# Patient Record
Sex: FEMALE | Race: WHITE | NOT HISPANIC OR LATINO | Employment: OTHER | URBAN - METROPOLITAN AREA
[De-identification: names, ages, dates, MRNs, and addresses within clinical notes are randomized per-mention and may not be internally consistent; named-entity substitution may affect disease eponyms.]

---

## 2017-01-09 ENCOUNTER — COMMUNICATION - HEALTHEAST (OUTPATIENT)
Dept: OBGYN | Facility: CLINIC | Age: 32
End: 2017-01-09

## 2017-01-10 ENCOUNTER — COMMUNICATION - HEALTHEAST (OUTPATIENT)
Dept: OBGYN | Facility: CLINIC | Age: 32
End: 2017-01-10

## 2017-01-10 DIAGNOSIS — Z34.82 ENCOUNTER FOR SUPERVISION OF OTHER NORMAL PREGNANCY, SECOND TRIMESTER: ICD-10-CM

## 2017-01-26 ENCOUNTER — PRENATAL OFFICE VISIT - HEALTHEAST (OUTPATIENT)
Dept: MIDWIFE SERVICES | Facility: CLINIC | Age: 32
End: 2017-01-26

## 2017-01-26 DIAGNOSIS — Z34.83 ENCOUNTER FOR SUPERVISION OF OTHER NORMAL PREGNANCY, THIRD TRIMESTER: ICD-10-CM

## 2017-01-26 ASSESSMENT — MIFFLIN-ST. JEOR: SCORE: 1334.52

## 2017-02-14 ENCOUNTER — PRENATAL OFFICE VISIT - HEALTHEAST (OUTPATIENT)
Dept: MIDWIFE SERVICES | Facility: CLINIC | Age: 32
End: 2017-02-14

## 2017-02-14 DIAGNOSIS — Z34.83 ENCOUNTER FOR SUPERVISION OF OTHER NORMAL PREGNANCY, THIRD TRIMESTER: ICD-10-CM

## 2017-02-14 ASSESSMENT — MIFFLIN-ST. JEOR: SCORE: 1357.2

## 2017-02-17 ENCOUNTER — ANESTHESIA - HEALTHEAST (OUTPATIENT)
Dept: OBGYN | Facility: HOSPITAL | Age: 32
End: 2017-02-17

## 2017-02-17 ENCOUNTER — COMMUNICATION - HEALTHEAST (OUTPATIENT)
Dept: OBGYN | Facility: CLINIC | Age: 32
End: 2017-02-17

## 2017-02-17 ENCOUNTER — SURGERY - HEALTHEAST (OUTPATIENT)
Dept: OBGYN | Facility: HOSPITAL | Age: 32
End: 2017-02-17

## 2017-02-17 ASSESSMENT — MIFFLIN-ST. JEOR: SCORE: 1357.2

## 2017-02-21 ENCOUNTER — COMMUNICATION - HEALTHEAST (OUTPATIENT)
Dept: OBGYN | Facility: CLINIC | Age: 32
End: 2017-02-21

## 2017-02-23 ENCOUNTER — OFFICE VISIT - HEALTHEAST (OUTPATIENT)
Dept: OBGYN | Facility: CLINIC | Age: 32
End: 2017-02-23

## 2017-02-23 DIAGNOSIS — Z98.890 POST-OPERATIVE STATE: ICD-10-CM

## 2017-02-23 ASSESSMENT — MIFFLIN-ST. JEOR: SCORE: 1334.52

## 2017-03-03 ENCOUNTER — COMMUNICATION - HEALTHEAST (OUTPATIENT)
Dept: OBGYN | Facility: CLINIC | Age: 32
End: 2017-03-03

## 2017-03-03 DIAGNOSIS — Z98.891 STATUS POST CESAREAN SECTION: ICD-10-CM

## 2017-03-03 DIAGNOSIS — O47.00 PRETERM CONTRACTIONS: ICD-10-CM

## 2017-03-03 DIAGNOSIS — Z34.83 ENCOUNTER FOR SUPERVISION OF OTHER NORMAL PREGNANCY, THIRD TRIMESTER: ICD-10-CM

## 2017-05-11 ENCOUNTER — RECORDS - HEALTHEAST (OUTPATIENT)
Dept: ADMINISTRATIVE | Facility: OTHER | Age: 32
End: 2017-05-11

## 2017-06-08 ENCOUNTER — OFFICE VISIT - HEALTHEAST (OUTPATIENT)
Dept: OBGYN | Facility: CLINIC | Age: 32
End: 2017-06-08

## 2017-06-08 DIAGNOSIS — O45.90 PLACENTAL ABRUPTION: ICD-10-CM

## 2017-06-08 ASSESSMENT — MIFFLIN-ST. JEOR: SCORE: 1257.41

## 2017-06-12 ENCOUNTER — COMMUNICATION - HEALTHEAST (OUTPATIENT)
Dept: ADMINISTRATIVE | Facility: CLINIC | Age: 32
End: 2017-06-12

## 2017-06-12 LAB — PHOSPHOLIPID AB IGA: <9.4 APL

## 2017-06-14 LAB
DRVVT, LUPUS ANTICOAGULANT - HISTORICAL: 41 SEC
PROTEIN C ACTIVITY - HISTORICAL: 101 % (ref 76–137)
PROTEIN S ACTIVITY - HISTORICAL: 82 % (ref 62–102)

## 2017-06-15 LAB
FACTOR V LEIDEN & PGM+MTHFR - HISTORICAL: NORMAL
SPECIMEN STATUS: NORMAL
THROMBOPHILIA RISK ASSESSMENT-L2M - HISTORICAL: NORMAL

## 2017-06-27 ENCOUNTER — COMMUNICATION - HEALTHEAST (OUTPATIENT)
Dept: MIDWIFE SERVICES | Facility: CLINIC | Age: 32
End: 2017-06-27

## 2017-08-30 ENCOUNTER — AMBULATORY - HEALTHEAST (OUTPATIENT)
Dept: NURSING | Facility: CLINIC | Age: 32
End: 2017-08-30

## 2017-11-28 ENCOUNTER — OFFICE VISIT - HEALTHEAST (OUTPATIENT)
Dept: MIDWIFE SERVICES | Facility: CLINIC | Age: 32
End: 2017-11-28

## 2017-11-28 DIAGNOSIS — Z01.812 PRE-PROCEDURE LAB EXAM: ICD-10-CM

## 2017-11-28 DIAGNOSIS — Z30.430 ENCOUNTER FOR INSERTION OF MIRENA IUD: ICD-10-CM

## 2017-11-28 ASSESSMENT — MIFFLIN-ST. JEOR: SCORE: 1275.55

## 2018-10-10 ENCOUNTER — OFFICE VISIT - HEALTHEAST (OUTPATIENT)
Dept: MIDWIFE SERVICES | Facility: CLINIC | Age: 33
End: 2018-10-10

## 2018-10-10 DIAGNOSIS — T83.32XA INTRAUTERINE CONTRACEPTIVE DEVICE THREADS LOST, INITIAL ENCOUNTER: ICD-10-CM

## 2018-10-10 DIAGNOSIS — Z30.431 SURVEILLANCE OF INTRAUTERINE CONTRACEPTIVE DEVICE: ICD-10-CM

## 2018-10-10 ASSESSMENT — MIFFLIN-ST. JEOR: SCORE: 1298.23

## 2019-08-26 ENCOUNTER — OFFICE VISIT - HEALTHEAST (OUTPATIENT)
Dept: FAMILY MEDICINE | Facility: CLINIC | Age: 34
End: 2019-08-26

## 2019-08-26 DIAGNOSIS — R00.2 PALPITATIONS: ICD-10-CM

## 2019-08-26 DIAGNOSIS — N64.4 BREAST PAIN, LEFT: ICD-10-CM

## 2019-08-26 DIAGNOSIS — R53.83 FATIGUE, UNSPECIFIED TYPE: ICD-10-CM

## 2019-08-26 ASSESSMENT — MIFFLIN-ST. JEOR: SCORE: 1298.23

## 2019-08-27 ENCOUNTER — RECORDS - HEALTHEAST (OUTPATIENT)
Dept: ADMINISTRATIVE | Facility: OTHER | Age: 34
End: 2019-08-27

## 2019-09-05 ENCOUNTER — COMMUNICATION - HEALTHEAST (OUTPATIENT)
Dept: FAMILY MEDICINE | Facility: CLINIC | Age: 34
End: 2019-09-05

## 2019-10-18 ENCOUNTER — AMBULATORY - HEALTHEAST (OUTPATIENT)
Dept: NURSING | Facility: CLINIC | Age: 34
End: 2019-10-18

## 2020-01-27 ENCOUNTER — COMMUNICATION - HEALTHEAST (OUTPATIENT)
Dept: ADMINISTRATIVE | Facility: CLINIC | Age: 35
End: 2020-01-27

## 2020-02-03 ENCOUNTER — COMMUNICATION - HEALTHEAST (OUTPATIENT)
Dept: OBGYN | Facility: CLINIC | Age: 35
End: 2020-02-03

## 2020-02-04 ENCOUNTER — AMBULATORY - HEALTHEAST (OUTPATIENT)
Dept: OBGYN | Facility: CLINIC | Age: 35
End: 2020-02-04

## 2020-02-04 DIAGNOSIS — Z87.59 HISTORY OF PLACENTAL ABRUPTION: ICD-10-CM

## 2020-02-10 ENCOUNTER — AMBULATORY - HEALTHEAST (OUTPATIENT)
Dept: MATERNAL FETAL MEDICINE | Facility: HOSPITAL | Age: 35
End: 2020-02-10

## 2020-05-10 ENCOUNTER — OFFICE VISIT - HEALTHEAST (OUTPATIENT)
Dept: FAMILY MEDICINE | Facility: CLINIC | Age: 35
End: 2020-05-10

## 2020-05-10 DIAGNOSIS — N39.0 ACUTE UTI (URINARY TRACT INFECTION): ICD-10-CM

## 2020-05-11 ENCOUNTER — COMMUNICATION - HEALTHEAST (OUTPATIENT)
Dept: FAMILY MEDICINE | Facility: CLINIC | Age: 35
End: 2020-05-11

## 2020-10-09 ENCOUNTER — TELEPHONE (OUTPATIENT)
Dept: MATERNAL FETAL MEDICINE | Facility: CLINIC | Age: 35
End: 2020-10-09

## 2020-10-09 NOTE — TELEPHONE ENCOUNTER
Patient was referred to [facility] for a preconception consultation in the month of February. Patient told [facility]  that she is not yet ready to be schedule for this appointment and will call back. Since [facility] have not heard back from patient orders will now be removed. Please send us a new referral when patient is ready to be schedule.    Referring clinic notified. Removing order.    Stacie Carr,    , [facility]

## 2020-10-12 ENCOUNTER — AMBULATORY - HEALTHEAST (OUTPATIENT)
Dept: FAMILY MEDICINE | Facility: CLINIC | Age: 35
End: 2020-10-12

## 2020-10-12 ENCOUNTER — AMBULATORY - HEALTHEAST (OUTPATIENT)
Dept: NURSING | Facility: CLINIC | Age: 35
End: 2020-10-12

## 2020-10-12 DIAGNOSIS — N64.4 BREAST PAIN, LEFT: ICD-10-CM

## 2020-11-16 ENCOUNTER — HEALTH MAINTENANCE LETTER (OUTPATIENT)
Age: 35
End: 2020-11-16

## 2021-02-02 ENCOUNTER — COMMUNICATION - HEALTHEAST (OUTPATIENT)
Dept: FAMILY MEDICINE | Facility: CLINIC | Age: 36
End: 2021-02-02

## 2021-05-30 VITALS — WEIGHT: 141 LBS | HEIGHT: 67 IN | BODY MASS INDEX: 22.13 KG/M2

## 2021-05-30 VITALS — HEIGHT: 67 IN | BODY MASS INDEX: 22.13 KG/M2 | WEIGHT: 141 LBS

## 2021-05-30 VITALS — HEIGHT: 67 IN | BODY MASS INDEX: 21.35 KG/M2 | WEIGHT: 136 LBS

## 2021-05-30 VITALS — BODY MASS INDEX: 21.35 KG/M2 | HEIGHT: 67 IN | WEIGHT: 136 LBS

## 2021-05-31 VITALS — BODY MASS INDEX: 19.3 KG/M2 | WEIGHT: 123 LBS | HEIGHT: 67 IN

## 2021-05-31 VITALS — HEIGHT: 67 IN | WEIGHT: 119 LBS | BODY MASS INDEX: 18.68 KG/M2

## 2021-05-31 NOTE — PROGRESS NOTES
"SUBJECTIVE: Muna Tapia is a 34 y.o. female with:  Chief Complaint   Patient presents with     Chest Pain     possible lymph nodes on left side under armpit      Abdominal Pain     sharp pain shoot left side      Fatigue     intense fatigue. not able to work out or go on walks. memory issues    She has been under lot of stress in last year.  She has 2 1/2 year old child who has chronic pulmonary issues/ seizures/ developmentally delayed.  Family decided to admit him to hospice in January but he stabilized until recently and now is having more trouble with seizures/ desats.  Needs 24 hour care at home.  She has more nursing help at night so she has more nights where she can sleep. She has been more fatigued in the last 1-2 months. She has trouble taking long walks or doing hot yoga.  Once she had to leave yoga class due to nausea.  She is more forgetful with normal activities.  Has to write things down.  She felt she was tolerating things ok but now she feels trouble with daily functioning.      Years ago she had panic attacks.  She will get a racing heart at times and get lightheaded but does not feel panicky. Happens about once every 2 weeks.  She is also getting pain in left breast that comes and goes.  Pain seems to move around.  Also has sharp pain left hip some times. She thinks a lot of her symptoms could be due to stress but would like to be checked out.    SH: .  Has 2 children.  Works part-time 2 days a week.    OBJECTIVE: /80 (Patient Site: Left Arm, Patient Position: Sitting, Cuff Size: Adult Regular)   Pulse 77   Ht 5' 7\" (1.702 m)   Wt 128 lb (58.1 kg)   SpO2 100%   BMI 20.05 kg/m     No acute distress.  HEENT: Head is atraumatic and/normocephalic.  PERRL.  Conjunctiva clear.  Tympanic membranes grey with normal landmarks and normal light reflexes.  No nasal discharge.  Oropharynx is pink and moist.  Sinuses nontender.  Neck: Supple.  No lymphadenopathy or thyromegaly.  Lungs: " Clear to auscultation.  No wheezing, retractions, or tachypnea.  Heart: RRR. S1 and S2 normal.  No murmurs, rubs, or gallops.  Breasts: Symmetric with normal shape and contour. Very dense breast tissue.  Tender over left lateral breast but no discrete mass/ lump.  No axillary lymphadenopathy.  Neuro: Awake, alert, oriented x 3.  Normal strength and tone.  Normal gait.     Muna RIDER was seen today for chest pain, abdominal pain and fatigue.    Diagnoses and all orders for this visit:    Fatigue, unspecified type- I suspect secondary to increased stress but will r/o other causes.  She [facility] so will add on CBC/ ferritin / B12/ magnesium RBC/ thyroid cascade.  Evaluate for adrenal dysfunction.  -     Cortisol, Saliva; Future  -     Cortisol, Saliva; Future  -     Cortisol, Saliva; Future  -     Cortisol, Saliva; Future    Breast pain, left- I suspect breast cyst.  Recommend ultrasound/ mammogram of left breast when she is able to do this.  She will call.    Palpitations- Check thyroid / magnesium levels. Call if worsening symptoms.    Belinda Zamora

## 2021-06-02 VITALS — HEIGHT: 67 IN | WEIGHT: 128 LBS | BODY MASS INDEX: 20.09 KG/M2

## 2021-06-03 VITALS — HEIGHT: 67 IN | WEIGHT: 128 LBS | BODY MASS INDEX: 20.09 KG/M2

## 2021-06-05 NOTE — TELEPHONE ENCOUNTER
Patient would like to discuss options to try to determine if she wants to get pregnant again.  She will check insurance and send me a "[employer]t message so referral can be placed.  I did  her [facility]

## 2021-06-05 NOTE — TELEPHONE ENCOUNTER
Name of caller: Patient  Phone number where you may be reached: [phone]  Reason for call: Pt had a c section in 2017 that Dr. Stratton performed and she is currently planning to conceive again. Pt states that Dr. Stratton discussed sending a referral to either MPP or MFM in the future if pt wanted to get pregnant again. Pt is calling to speak with Dr. Stratton about preconception planning and to request a referral. Pt is going to check with her insurance to see if MPP or MFM are in network.   Best time to call back: any  If we don't reach you, is it okay to leave a detailed message? yes

## 2021-06-06 NOTE — PROGRESS NOTES
GAVIN received preconception referral from [facility].  Patient will need to be seen at the [facility] location and is scheduled for 4/2/2020.       Breanne Mcnulty

## 2021-06-08 NOTE — PROGRESS NOTES
Muna is here by herself for a routine 32 wk PNV, had been in hospital with possible PTL but no cx change and post fFN,   Feeling better but still is having UC's--not more than 5/hr. Still doing less activity, understands wt lifting restrictions with toddler.  Looked at wt gain chart--doing very well.

## 2021-06-08 NOTE — PROGRESS NOTES
Provider E-Visit time total (minutes): 5 minutes    S: Pt complains of 2-3 days of dysuria/ frequency/ cloudy urine / abdominal pain / low grade fever.  Feels similar to UTI in the past.  Has been trying cranberry juice but no improvement.  She does have Mirena IUD for contraception.    Patient Active Problem List   Diagnosis     Thoracic Sprain     Vegetarian diet     Surveillance of intrauterine contraceptive device     IUD strings lost      Allergies   Allergen Reactions     Hydrocodone-Acetaminophen Nausea And Vomiting     Muna RIDER was seen today for dysuria.    Diagnoses and all orders for this visit:    Acute UTI (urinary tract infection)  -     nitrofurantoin, macrocrystal-monohydrate, (MACROBID) 100 MG capsule; Take 1 capsule (100 mg total) by mouth 2 (two) times a day for 5 days.     Will have her take a course of antibiotics.  If not improving, will need to get urine culture.  Take probiotics in between and after course of antibiotics.    Belinda Zamora

## 2021-06-08 NOTE — ANESTHESIA POSTPROCEDURE EVALUATION
"Patient: Muna Tapia   SECTION  Anesthesia type: general    Patient location: PACU  Last vitals:   Vitals:    17 0700   BP: 111/66   Pulse: 92   Resp: 14   Temp:    SpO2: 100%     Post vital signs: stable  Level of consciousness: alert and conversant.  Post-anesthesia pain: pain controlled  Post-anesthesia nausea and vomiting: no  Pulmonary: unassisted, return to baseline  Cardiovascular: stable and blood pressure at baseline  Hydration: adequate  Anesthetic events: no   D-dimer elevated (20.0), fibrinogen less than nl, PTT normal.  No clinical signs of bleeding.  Dr. Gene Case (OB) was available and thought there was no reason to be concerned about this.  Surgeon's note states \"significant clots behind placenta in the uterus\".       QCDR Measures:  ASA# 11 - Saba-op Cardiac Arrest: ASA11B - Patient did NOT experience unanticipated cardiac arrest  ASA# 12 - Saba-op Mortality Rate: ASA12B - Patient did NOT die  ASA# 13 - PACU Re-Intubation Rate: ASA13B - Patient did NOT require a new airway mgmt  ASA# 10 - Composite Anes Safety: ASA10A - No serious adverse event  ASA# 38 - New Corneal Injury: ASA38A - No new exposure keratitis or corneal abrasion in PACU    Additional Notes:  "

## 2021-06-08 NOTE — PROGRESS NOTES
Muna Tapia is here by herself for a routine prenatal visit at 29w1d.  She has no concerns and is feeling well.  States she continues to have irregular contractions that increase with activity but will resolve with rest and hydration.  States she had appointment with Dr. Rivera last week and was told she may return to CNM care.  Reviewed PTL symptoms and when to call CNM on call with concerns.  She is feeling fetal movement regularly. Fetal maturity and expectations for fetal movement in the third trimester.  Interval weight gain is appropriate.  28 week labs (1 hour GTT, Hgb) obtained today.  Discussed CDC recommendations for re-screening of syphilis, patient declines testing today declines testing PPD 1.  Risks and benefits of TdaP during pregnancy at 27-36 weeks discussed and declined. Reviewed resources for [facility]. Advised scheduling visits every 2 weeks until ~36 weeks. Anticipatory guidance, warning signs (with emphasis on  labor signs and symptoms), when to call and CNM contact information reviewed. Ped is Dr. Zamora

## 2021-06-08 NOTE — ANESTHESIA CARE TRANSFER NOTE
Last vitals:   Vitals:    02/17/17 0620   BP: 124/76   Pulse: (!) 102   Resp: 16   Temp: 36.4  C (97.6  F)   SpO2: 100%     Patient's level of consciousness is drowsy  Spontaneous respirations: yes  Maintains airway independently: yes  Dentition unchanged: yes  Oropharynx: oropharynx clear of all foreign objects    QCDR Measures:  ASA# 20 - Surgical Safety Checklist: ASA20A - Safety Checks Done  PQRS# 430 - Adult PONV Prevention: 4558F - Pt received => 2 anti-emetic agents (different classes) preop & intraop  ASA# 8 - Peds PONV Prevention: NA - Not pediatric patient, not GA or 2 or more risk factors NOT present  PQRS# 424 - Saba-op Temp Management: 4559F - At least one body temp DOCUMENTED => 35.5C or 95.9F within required timeframe  PQRS# 426 - PACU Transfer Protocol: - Transfer of care checklist used  ASA# 14 - Acute Post-op Pain: ASA14B - Patient did NOT experience pain >= 7 out of 10    I completed my SBAR handoff to the receiving nurse per policy and procedure.

## 2021-06-08 NOTE — ANESTHESIA PREPROCEDURE EVALUATION
Anesthesia Evaluation        Airway   Mallampati: II  Neck ROM: full   Pulmonary - negative ROS and normal exam                          Cardiovascular - negative ROS and normal exam   Neuro/Psych - negative ROS     Endo/Other       GI/Hepatic/Renal - negative ROS           Dental - normal exam                        Anesthesia Plan  Planned anesthetic: general endotracheal    ASA 1 - emergent   Induction: intravenous   Anesthetic plan and risks discussed with: patient  Anesthesia plan special considerations: rapid sequence induction,   Post-op plan: routine recovery

## 2021-06-09 NOTE — PROGRESS NOTES
"S:  The patient is here for a post-op visit following a  section for fetal bradycardia.  Her surgery was on 17.  She has been feeling well overall.  Her complaints are a rash that flared a couple days ago but has mostly resolved today.  Her lochia is normal.  Pain is mostly gone.  [facility].  He was extubated and breathed on his own for 15 minutes today.  He had an MRI that showed some significant deficits in the hippocampus, corpus callosum and basal ganglia.  He has not had seizures since he had a change over to a new monitor system.    O:    Visit Vitals     /68 (Patient Site: Left Arm, Patient Position: Sitting, Cuff Size: Adult Regular)     Pulse 74     Ht 5' 7\" (1.702 m)     Wt 136 lb (61.7 kg)     LMP 2016 (Exact Date)    Body mass index is 21.3 kg/(m^2).    Abdomen soft, non tender, non distended, incision healing well, uterus 14-16 week size; rash resolved    A:  Normal post-op check    P:  Activity and restrictions d/w the patient.  Placental pathology d/w patient.  Follow up in 5 weeks with the CNMs for regular post-partum appointment.  She will see me in April or May for a thrombophilia and auto immune disease work up.  Patient instructed to call any time with questions.  "

## 2021-06-11 NOTE — PROGRESS NOTES
"S: The patient is here in follow up of placental abruption.  See note from 2/23/17 in clinic as well as Op note from 2/17/17.  Her son is home from the hospital.  She has been having a hard time emotionally as well as physically since she hasn't had much help at home for either of her sons or herself.  Her  has been working a lot to help pay for care.  She did finally have Home Care start coming this week, so she did get to sleep last night and took a shower this morning.  She is questioning everything about her pregnancy in the few months leading up to her delivery.  She wants answers but also understands that there may never be a why that is discovered.  She is trying to do counseling but hasn't done much since the baby has been home because of the lack of help.  She and her  haven't been to couples counseling.  She doesn't think she wants any more pregnancies after what happened, but she isn't willing to completely cut off that possibility.  She has had some incisional pulling when she's tried yoga recently, but she is running without having pain.  Her periods are back.  She is pumping.    Outpatient Medications Prior to Visit   Medication Sig Dispense Refill     IBUPROFEN (ADVIL ORAL) Take by mouth.       PRENATAL VIT #91/FE FUM/FA/DHA (PRENATAL + DHA ORAL) Take by mouth.       RINGERS SOLUTION,LACTATED (LACTATED RINGERS) bolus Infuse 100 mL/hr into a venous catheter continuous. 250 mL 0     No facility-administered medications prior to visit.        Patient is allergic to hydrocodone-acetaminophen.    O:  /66 (Patient Site: Left Arm, Patient Position: Sitting, Cuff Size: Adult Regular)  Pulse 68  Resp 16  Ht 5' 7\" (1.702 m)  Wt 119 lb (54 kg)  LMP 05/22/2017 (Approximate)  Breastfeeding? Yes  BMI 18.64 kg/m2          Body mass index is 18.64 kg/(m^2).     General: thin WF, NAD  Abdomen: incision well healed, soft, NT/ND    Assessment: 32 y.o. MWF  with a history of a placental " abruption at 32 weeks gestation without obvious risk factors.    Plan: Thrombophilia and autoimmune labs drawn today.  She will be notified as they come back.  Option of an MFM consult was discussed with her if she wants help with deciding on a possible future pregnancy.  Age and pregnancy discussed with her.  We discussed the importance of using her help for herself and her 2 year old son to try to keep his life more normal.  We also discussed the importance of couples therapy because of everything that is going on, along with individual counseling as well.  We discussed that frustration of potentially not knowing why this happened.  Activity discussed with her.  Questions all answered.  Approximately 40 minutes were spent with the patient with the majority in counseling.

## 2021-06-12 NOTE — PROGRESS NOTES
Patient still having intermittent breast pain on left side with no lumps/ masses.  We discussed diagnostic breast evaluation in past and she wants to pursue now.

## 2021-06-14 NOTE — PROGRESS NOTES
"IUD Insertion Procedure Note    Pre-operative Diagnosis: well woman needing birth control    Post-operative Diagnosis: same    Indications: contraception    No contraindications to Mirena IUD:   No untreated pelvic infection now   Has not had a serious pelvic infection in the past 3 months after a pregnancy   Does not have CA of uterus or cervix  No unexplained uterine or vaginal bleeding  No liver disease or a liver tumor   No breast cancer or any other cancer that is sensitive to progestin   Has no condition that changes the shape of the uterus  Not allergic to levonorgestrel silicone, polyethylene, silica, barium sulfate or iron oxide       HPI/ROS:   Muna Tapia is a 32 y.o. female who presents for IUD insert. LMP 11/13/17. Current birth control method condoms.     Reports no unprotected intercourse in the last two weeks.     Urine pregnancy test was performed in clinic and was negative. Pt education included mechanism of action of Mirena IUD. The risks (including infection, bleeding, pain, and uterine perforation) and benefits of the procedure were explained to the patient and informed consent was obtained and consent form signed.     Pt teary throughout visit. \"Hard to be here and thinking about my  possibly getting a vasectomy is too hard.\" \"I changed my mind and I want a Mirena today.\"  Her 9 month old son has significant disability--he reacts to light. 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    Procedure Details   Pt took 600 mg of ibuprofen while sitting in the office prior to procedure.  Bimanual exam performed revealing a retroverted uterus, non-tender, negative CMT. Cervix is parous, long, thick, closed. Sterile speculum placed. GC/CT collected. Cervix cleansed with Betadine. Tenaculum not needed. Uterus sounded to 6.5 cm. Mirena IUD inserted without difficulty. String visible and trimmed to 3 cm. Minimal bleeding noted. Patient tolerated procedure well. Did not " have any periods of syncope, sat up and ambulated with ease.     IUD Information:  Mirena, Lot # [id number], Expiration date 4/20.    Condition:  Stable    Complications:  None    Plan:  -Discussed danger signs and symptoms of the IUD including how to check for strings. Instructed patient to check her strings monthly. Discussed when/where to call with any fever, severe back pain, severe abdominal pain, heavy bleeding (soaking more than 1 pad per hour). Also encouraged to call if she does not feel her strings. She was advised to use Ibuprofen as needed for mild to moderate pain. Manufactures information given for patient education.   - Discussed that IUD contraception does not protect against STIs and so condoms should be used for STI protection in situations where she may be exposed.   - Encouraged nothing in the vagina for 24 hours.   -GC/Chlamydia pending  -Mirena is effective against pregnancy immediately if inserted within 7 days after the start of period. Encouraged she use another form of contraception for first 7 days after insertion. Protection against pregnacy will begin after 7 days.   -Mirena IUD should be removed by 11/28/2022  -Encouraged to return to clinic in 4-6 weeks for IUD check or sooner prn.   -counseling done regarding mild situational depression and resources given. (including CNM BB contact info [facility])    Total time with patient 30 min>50% time spent in counseling or coordination of care.

## 2021-06-18 NOTE — PATIENT INSTRUCTIONS - HE
Patient Instructions by Belinda Zamora MD at 5/11/2020 11:33 AM     Author: Belinda Zamora MD Service: -- Author Type: Physician    Filed: 5/11/2020 11:33 AM Encounter Date: 5/10/2020 Status: Signed    : Belinda Zamora MD (Physician)           Urinary Tract Infections in Women    Urinary tract infections (UTIs) are most often caused by bacteria. These bacteria enter the urinary tract. The bacteria may come from inside the body. Or they may travel from the skin outside the rectum or vagina into the urethra. Female anatomy makes it easy for bacteria from the bowel to enter a womans urinary tract, which is the most common source of UTI. This means women develop UTIs more often than men. Pain in or around the urinary tract is a common UTI symptom. But the only way to know for sure if you have a UTI for the healthcare provider to test your urine. The two tests that may be done are the urinalysis and urine culture.  Types of UTIs    Cystitis. A bladder infection (cystitis) is the most common UTI in women. You may have urgent or frequent need to pee. You may also have pain, burning when you pee, and bloody urine.    Urethritis. This is an inflamed urethra, which is the tube that carries urine from the bladder to outside the body. You may have lower stomach or back pain. You may also have urgent or frequent need to pee.    Pyelonephritis. This is a kidney infection. If not treated, it can be serious and damage your kidneys. In severe cases, you may need to stay in the hospital. You may have a fever and lower back pain.    Medicines to treat a UTI  Most UTIs are treated with antibiotics. These kill the bacteria. The length of time you need to take them depends on the type of infection. It may be as short as 3 days. If you have repeated UTIs, you may need a low-dose antibiotic for several months. Take antibiotics exactly as directed. Dont stop taking them until all of the medicine is  gone. If you stop taking the antibiotic too soon, the infection may not go away. You may also develop a resistance to the antibiotic. This can make it much harder to treat.  Lifestyle changes to treat and prevent UTIs  The lifestyle changes below will help get rid of your UTI. They may also help prevent future UTIs.    Drink plenty of fluids. This includes water, juice, or other caffeine-free drinks. Fluids help flush bacteria out of your body.    Empty your bladder. Always empty your bladder when you feel the urge to pee. And always pee before going to sleep. Urine that stays in your bladder can lead to infection. Try to pee before and after sex as well.    Practice good personal hygiene. Wipe yourself from front to back after using the toilet. This helps keep bacteria from getting into the urethra.    Use condoms during sex. These help prevent UTIs caused by sexually transmitted bacteria. Also don't use spermicides during sex. These can increase the risk for UTIs. Choose other forms of birth control instead. For women who tend to get UTIs after sex, a low-dose of a preventive antibiotic may be used. Be sure to discuss this option with your healthcare provider.    Follow up with your healthcare provider as directed. He or she may test to make sure the infection has cleared. If needed, more treatment may be started.  Date Last Reviewed: 7/1/2019 2000-2019 [employer]. [address] [place] [address], [place] [address]. All rights reserved. This information is not intended as a substitute for professional medical care. Always follow your healthcare professional's instructions.

## 2021-06-20 NOTE — PROGRESS NOTES
"Assessment:   Intrauterine device correctly inserted     Plan:   -Discussed danger signs and symptoms of the IUD including how to check for strings. Instructed patient to check her strings monthly. Discussed when/where to call with any fever, severe back pain, severe abdominal pain, heavy bleeding (soaking more than 1 pad per hour).  She was advised to use Ibuprofen as needed for mild to moderate pain.   -Mirena IUD should be removed by 11/28/2022.   -Encouraged to call for referral for pelvic ultrasound with persistent lower uterine pain.   -Prior to leaving asks about mild intermittent bilateral achiness of her breasts. Denies feeling lumps or masses. Discussed possible causes but without an exam/thorough assessment this writer is unable to give accurate advice. Enc to contact her PCP or return to clinic with persistent pains or concerns about her breasts.     TT with patient 20 mns >50% time spent in counseling or coordination of care.     Tea King, APRN, CNM, IBCLC    Subjective:    Muna Tapia \"Jil\" is a 33 y.o. female who is here today because she can no longer feel her IUD strings.  Also on Sunday and Monday she was reporting some intense lower uterine pain, which required ibuprofen.  It has since resolved.  She has had trouble feeling her IUD strings for the last several months. She had a Mirena inserted on 11/28/17.  Initially after insertion, she had 5 months of vaginal bleeding and cramping.  After that time she was getting regular cycles.  She has not had a regular period in several months.  Denies any pain with intercourse.    Review of Systems  Pertinent items are noted in HPI.      Objective:   /70 (Patient Site: Left Arm, Patient Position: Sitting, Cuff Size: Adult Regular)  Pulse 76  Ht 5' 7\" (1.702 m)  Wt 128 lb (58.1 kg)  Breastfeeding? No  BMI 20.05 kg/m2    Physical Exam:  General: Pleasant, articulate, well-groomed, well-nourished female.  Not in any apparent " distress.  Abdomen: Soft, nontender, no masses palpated, negative CVAT.  External genitalia: Normal hair distribution, no lesions.  Urethral opening: Without lesions or discharge. No tenderness.   Bladder: Without masses, or tenderness.  Vagina: Pink, rugated, normal-appearing discharge.  Cervix: parous, pink, smooth, no lesions. IUD strings visualized and 3 cm in length.   Bimanual: small, mobile, nontender, no masses.  Negative CMT.  Adnexa, without masses or tenderness.

## 2021-06-21 NOTE — LETTER
Letter by Belinda Zamora MD at      Author: Belinda Zamora MD Service: -- Author Type: --    Filed:  Encounter Date: 2021 Status: (Other)       2021   Muna Tapia    1985       To whom it may concern:    I am the primary caregiver for Muna Tapia and she is the primary home health care provider for her child who has a number of chronic diseases.  She should be considered in the priority 2 section 1A of the COVID 19 vaccination roll-out. I recommend she get the vaccine as soon as possible.      Sincerely,  Belinda Zamora MD

## 2021-07-14 PROBLEM — Z30.430 ENCOUNTER FOR INSERTION OF MIRENA IUD: Status: RESOLVED | Noted: 2017-11-28 | Resolved: 2018-10-10

## 2021-07-14 PROBLEM — O47.00 PRETERM CONTRACTIONS: Status: RESOLVED | Noted: 2017-01-10 | Resolved: 2018-10-10

## 2021-07-27 ENCOUNTER — ANCILLARY PROCEDURE (OUTPATIENT)
Dept: MAMMOGRAPHY | Facility: CLINIC | Age: 36
End: 2021-07-27
Attending: FAMILY MEDICINE
Payer: COMMERCIAL

## 2021-07-27 DIAGNOSIS — N64.4 BREAST PAIN, LEFT: ICD-10-CM

## 2021-07-27 PROCEDURE — 76642 ULTRASOUND BREAST LIMITED: CPT | Mod: LT | Performed by: RADIOLOGY

## 2021-07-27 PROCEDURE — G0279 TOMOSYNTHESIS, MAMMO: HCPCS | Performed by: RADIOLOGY

## 2021-07-27 PROCEDURE — 77066 DX MAMMO INCL CAD BI: CPT | Performed by: RADIOLOGY

## 2021-09-18 ENCOUNTER — HEALTH MAINTENANCE LETTER (OUTPATIENT)
Age: 36
End: 2021-09-18

## 2021-10-01 ENCOUNTER — OFFICE VISIT (OUTPATIENT)
Dept: URGENT CARE | Facility: URGENT CARE | Age: 36
End: 2021-10-01
Payer: COMMERCIAL

## 2021-10-01 VITALS
HEART RATE: 85 BPM | WEIGHT: 130 LBS | TEMPERATURE: 98.4 F | HEIGHT: 67 IN | RESPIRATION RATE: 18 BRPM | DIASTOLIC BLOOD PRESSURE: 77 MMHG | BODY MASS INDEX: 20.4 KG/M2 | OXYGEN SATURATION: 99 % | SYSTOLIC BLOOD PRESSURE: 135 MMHG

## 2021-10-01 DIAGNOSIS — R10.84 ABDOMINAL PAIN, GENERALIZED: Primary | ICD-10-CM

## 2021-10-01 LAB
ALBUMIN SERPL-MCNC: 3.6 G/DL (ref 3.4–5)
ALBUMIN UR-MCNC: NEGATIVE MG/DL
ALP SERPL-CCNC: 51 U/L (ref 40–150)
ALT SERPL W P-5'-P-CCNC: 17 U/L (ref 0–50)
AMYLASE SERPL-CCNC: 45 U/L (ref 30–110)
ANION GAP SERPL CALCULATED.3IONS-SCNC: 3 MMOL/L (ref 3–14)
APPEARANCE UR: CLEAR
AST SERPL W P-5'-P-CCNC: 11 U/L (ref 0–45)
BACTERIA #/AREA URNS HPF: ABNORMAL /HPF
BASOPHILS # BLD AUTO: 0 10E3/UL (ref 0–0.2)
BASOPHILS NFR BLD AUTO: 0 %
BILIRUB SERPL-MCNC: 0.3 MG/DL (ref 0.2–1.3)
BILIRUB UR QL STRIP: NEGATIVE
BUN SERPL-MCNC: 12 MG/DL (ref 7–30)
CALCIUM SERPL-MCNC: 9.2 MG/DL (ref 8.5–10.1)
CHLORIDE BLD-SCNC: 106 MMOL/L (ref 94–109)
CLUE CELLS: ABNORMAL
CO2 SERPL-SCNC: 27 MMOL/L (ref 20–32)
COLOR UR AUTO: YELLOW
CREAT SERPL-MCNC: 0.67 MG/DL (ref 0.52–1.04)
EOSINOPHIL # BLD AUTO: 0.1 10E3/UL (ref 0–0.7)
EOSINOPHIL NFR BLD AUTO: 1 %
ERYTHROCYTE [DISTWIDTH] IN BLOOD BY AUTOMATED COUNT: 11.7 % (ref 10–15)
GFR SERPL CREATININE-BSD FRML MDRD: >90 ML/MIN/1.73M2
GLUCOSE BLD-MCNC: 85 MG/DL (ref 70–99)
GLUCOSE UR STRIP-MCNC: NEGATIVE MG/DL
HCG UR QL: NEGATIVE
HCT VFR BLD AUTO: 38.5 % (ref 35–47)
HGB BLD-MCNC: 13.5 G/DL (ref 11.7–15.7)
HGB UR QL STRIP: NEGATIVE
IMM GRANULOCYTES # BLD: 0 10E3/UL
IMM GRANULOCYTES NFR BLD: 0 %
KETONES UR STRIP-MCNC: NEGATIVE MG/DL
LEUKOCYTE ESTERASE UR QL STRIP: ABNORMAL
LIPASE SERPL-CCNC: 116 U/L (ref 73–393)
LYMPHOCYTES # BLD AUTO: 1.7 10E3/UL (ref 0.8–5.3)
LYMPHOCYTES NFR BLD AUTO: 24 %
MCH RBC QN AUTO: 31.8 PG (ref 26.5–33)
MCHC RBC AUTO-ENTMCNC: 35.1 G/DL (ref 31.5–36.5)
MCV RBC AUTO: 91 FL (ref 78–100)
MONOCYTES # BLD AUTO: 0.7 10E3/UL (ref 0–1.3)
MONOCYTES NFR BLD AUTO: 9 %
NEUTROPHILS # BLD AUTO: 4.7 10E3/UL (ref 1.6–8.3)
NEUTROPHILS NFR BLD AUTO: 66 %
NITRATE UR QL: NEGATIVE
PH UR STRIP: 6 [PH] (ref 5–7)
PLATELET # BLD AUTO: 254 10E3/UL (ref 150–450)
POTASSIUM BLD-SCNC: 3.8 MMOL/L (ref 3.4–5.3)
PROT SERPL-MCNC: 7.1 G/DL (ref 6.8–8.8)
RBC # BLD AUTO: 4.24 10E6/UL (ref 3.8–5.2)
RBC #/AREA URNS AUTO: ABNORMAL /HPF
SODIUM SERPL-SCNC: 136 MMOL/L (ref 133–144)
SP GR UR STRIP: >=1.03 (ref 1–1.03)
TRICHOMONAS, WET PREP: ABNORMAL
UROBILINOGEN UR STRIP-ACNC: 0.2 E.U./DL
WBC # BLD AUTO: 7.1 10E3/UL (ref 4–11)
WBC #/AREA URNS AUTO: ABNORMAL /HPF
WBC'S/HIGH POWER FIELD, WET PREP: ABNORMAL
YEAST, WET PREP: ABNORMAL

## 2021-10-01 PROCEDURE — 36415 COLL VENOUS BLD VENIPUNCTURE: CPT | Performed by: PHYSICIAN ASSISTANT

## 2021-10-01 PROCEDURE — 81001 URINALYSIS AUTO W/SCOPE: CPT | Performed by: PHYSICIAN ASSISTANT

## 2021-10-01 PROCEDURE — 99214 OFFICE O/P EST MOD 30 MIN: CPT | Performed by: PHYSICIAN ASSISTANT

## 2021-10-01 PROCEDURE — 85025 COMPLETE CBC W/AUTO DIFF WBC: CPT | Performed by: PHYSICIAN ASSISTANT

## 2021-10-01 PROCEDURE — 81025 URINE PREGNANCY TEST: CPT | Performed by: PHYSICIAN ASSISTANT

## 2021-10-01 PROCEDURE — 87210 SMEAR WET MOUNT SALINE/INK: CPT | Performed by: PHYSICIAN ASSISTANT

## 2021-10-01 PROCEDURE — 83690 ASSAY OF LIPASE: CPT | Performed by: PHYSICIAN ASSISTANT

## 2021-10-01 PROCEDURE — 80053 COMPREHEN METABOLIC PANEL: CPT | Performed by: PHYSICIAN ASSISTANT

## 2021-10-01 PROCEDURE — 82150 ASSAY OF AMYLASE: CPT | Performed by: PHYSICIAN ASSISTANT

## 2021-10-01 ASSESSMENT — MIFFLIN-ST. JEOR: SCORE: 1307.31

## 2021-10-01 NOTE — PROGRESS NOTES
"SUBJECTIVE  HPI:  Muna Tapia is a 36 year old female who presents with the CC of generalized abdomina pain. Pain is intermittent and mild to moderate.  Frequency has increased, pain has not. Has been present for about 10 days.   Tender when present.  Only made worse with palpation, not with foods. Nothing seems to improve it either.       No past medical history on file.  Current Outpatient Medications   Medication Sig Dispense Refill     levonorgestrel (MIRENA) 20 mcg/24 hr (5 years) IUD [LEVONORGESTREL (MIRENA) 20 MCG/24 HR (5 YEARS) IUD] 1 each by Intrauterine route once. Mirena IUD Inserted by Minerva Willson CNM on 11-28-17 (Due for removal by 11-)  [employer]  Lot [id number]  Exp 04/20  NDC 23370-870-24       Social History     Tobacco Use     Smoking status: Never Smoker     Smokeless tobacco: Never Used   Substance Use Topics     Alcohol use: No       ROS:  10 point ROS negative except as listed above      OBJECTIVE:  /77   Pulse 85   Temp 98.4  F (36.9  C) (Oral)   Resp 18   Ht 1.702 m (5' 7\")   Wt 59 kg (130 lb)   SpO2 99%   BMI 20.36 kg/m    GENERAL APPEARANCE: healthy, alert and no distress  EYES: EOMI,  PERRL, conjunctiva clear  HENT: ear canals and TM's normal.  Nose and mouth without ulcers, erythema or lesions  NECK: supple, nontender, no lymphadenopathy  RESP: lungs clear to auscultation - no rales, rhonchi or wheezes  CV: regular rates and rhythm, normal S1 S2, no murmur noted  ABDOMEN:  soft, nontender, no HSM or masses and bowel sounds normal  NEURO: Normal strength and tone, sensory exam grossly normal,  normal speech and mentation  SKIN: no suspicious lesions or rashes    Results for orders placed or performed in visit on 10/01/21   HCG Qual, Urine ([id number])     Status: Normal   Result Value Ref Range    hCG Urine Qualitative Negative Negative   CBC with platelets and differential     Status: None   Result Value Ref Range    WBC Count 7.1 4.0 - " 11.0 10e3/uL    RBC Count 4.24 3.80 - 5.20 10e6/uL    Hemoglobin 13.5 11.7 - 15.7 g/dL    Hematocrit 38.5 35.0 - 47.0 %    MCV 91 78 - 100 fL    MCH 31.8 26.5 - 33.0 pg    MCHC 35.1 31.5 - 36.5 g/dL    RDW 11.7 10.0 - 15.0 %    Platelet Count 254 150 - 450 10e3/uL    % Neutrophils 66 %    % Lymphocytes 24 %    % Monocytes 9 %    % Eosinophils 1 %    % Basophils 0 %    % Immature Granulocytes 0 %    Absolute Neutrophils 4.7 1.6 - 8.3 10e3/uL    Absolute Lymphocytes 1.7 0.8 - 5.3 10e3/uL    Absolute Monocytes 0.7 0.0 - 1.3 10e3/uL    Absolute Eosinophils 0.1 0.0 - 0.7 10e3/uL    Absolute Basophils 0.0 0.0 - 0.2 10e3/uL    Absolute Immature Granulocytes 0.0 <=0.0 10e3/uL   Wet prep - Clinic Collect     Status: Abnormal    Specimen: Vagina; Swab   Result Value Ref Range    Trichomonas Absent Absent    Yeast Absent Absent    Clue Cells Absent Absent    WBCs/high power field 1+ (A) None   CBC with platelets and differential     Status: None    Narrative    The following orders were created for panel order CBC with platelets and differential.  Procedure                               Abnormality         Status                     ---------                               -----------         ------                     CBC with platelets and d...[[id number]]                      Final result                 Please view results for these tests on the individual orders.           ASSESSMENT:  (R10.84) Abdominal pain, generalized  (primary encounter diagnosis)  Comment: unclear etiology, no red flags  Plan: Wet prep - Clinic Collect, UA Macro with Reflex        to Micro and Culture - lab collect, HCG Qual,         Urine ([id number]), CBC with platelets and         differential, Comprehensive metabolic panel         (BMP + Alb, Alk Phos, ALT, AST, Total. Bili,         TP), Amylase, Lipase, Urine Microscopic      COMP and pancreatic enzymes pending    Patient Instructions     Patient Education     Unknown Causes of Abdominal  Pain (Female)    The exact cause of your belly (abdominal) pain is not clear. This does not mean that this is something to worry about. Everyone likes to know the exact cause of the problem. But sometimes with belly pain, there is no clear-cut cause, and this could be a good thing. The good news is that your symptoms can be treated, and you will feel better.   Your condition does not seem serious now. But sometimes the signs of a serious problem may take more time to appear. For this reason, it is important for you to watch for any new symptoms, problems, or worsening of your condition.  Over the next few days, the abdominal pain may come and go. Or it may be constant. Other common symptoms can include nausea and vomiting. Sometimes it can be difficult to tell if you feel nauseous. You may just feel bad and not connect that feeling to nausea. Constipation, diarrhea, and a fever may go along with the pain.  The pain may continue even if treated correctly over the following days. Depending on how things go, sometimes the cause can become clear and may need more or different treatment. Additional evaluations, medicines, or tests may also be needed.  Home care  Your healthcare provider may prescribe medicine for pain, symptoms, or an infection.  Follow the healthcare provider's instructions for taking these medicines.  General care    Rest as much as you can until your next exam. No strenuous activities.    Try to find positions that ease discomfort. A small pillow placed on the abdomen may help relieve pain.    Something warm on your abdomen (such as a heating pad) may help, but be careful not to burn yourself.  Diet    Don t force yourself to eat, especially if having cramps, vomiting, or diarrhea.    Water is important so you don't get dehydrated. Soup may also be good. Sports drinks may also help, especially if they are not too acidic. Don't drink sugary drinks as this can make things worse. Take liquids in small  amounts. Don t guzzle them.    Caffeine sometimes makes the pain and cramping worse.    Don t take dairy products if you have vomiting or diarrhea.    Don't eat large amounts at a time. Wait a few minutes between bites.    Eat a diet low in fiber (called a low-residue diet). Foods allowed include refined breads, white rice, fruit and vegetable juices without pulp, tender meats. These foods will pass more easily through the intestine.    Don t have whole-grain foods, whole fruits and vegetables, meats, seeds and nuts, fried or fatty foods, dairy, alcohol and spicy foods until your symptoms go away.  Follow-up care  Follow up with your healthcare provider, or as advised, if your pain does not begin to improve in the next 24 hours.  Call 911  Call 911 if any of these occur:    Trouble breathing    Confusion    Fainting or loss of consciousness    Rapid heart rate    Seizure  When to seek medical advice  Call your healthcare provider right away if any of these occur:    Pain gets worse or moves to the right lower abdomen    New or worsening vomiting or diarrhea    Swelling of the abdomen    Unable to pass stool for more than 3 days    Fever of 100.4 F (38 C) or higher, or as directed by your healthcare provider.    Blood in vomit or bowel movements (dark red or black color)    Yellow color of eyes and skin (jaundice)    Weakness, dizziness    Chest, arm, back, neck, or jaw pain    Unexpected vaginal bleeding or missed period    Can't keep down liquids or water and you are getting dehydrated  [employer] last reviewed this educational content on 6/1/2018 2000-2021 [employer]. All rights reserved. This information is not intended as a substitute for professional medical care. Always follow your healthcare professional's instructions.

## 2021-10-01 NOTE — PATIENT INSTRUCTIONS
Patient Education     Unknown Causes of Abdominal Pain (Female)    The exact cause of your belly (abdominal) pain is not clear. This does not mean that this is something to worry about. Everyone likes to know the exact cause of the problem. But sometimes with belly pain, there is no clear-cut cause, and this could be a good thing. The good news is that your symptoms can be treated, and you will feel better.   Your condition does not seem serious now. But sometimes the signs of a serious problem may take more time to appear. For this reason, it is important for you to watch for any new symptoms, problems, or worsening of your condition.  Over the next few days, the abdominal pain may come and go. Or it may be constant. Other common symptoms can include nausea and vomiting. Sometimes it can be difficult to tell if you feel nauseous. You may just feel bad and not connect that feeling to nausea. Constipation, diarrhea, and a fever may go along with the pain.  The pain may continue even if treated correctly over the following days. Depending on how things go, sometimes the cause can become clear and may need more or different treatment. Additional evaluations, medicines, or tests may also be needed.  Home care  Your healthcare provider may prescribe medicine for pain, symptoms, or an infection.  Follow the healthcare provider's instructions for taking these medicines.  General care    Rest as much as you can until your next exam. No strenuous activities.    Try to find positions that ease discomfort. A small pillow placed on the abdomen may help relieve pain.    Something warm on your abdomen (such as a heating pad) may help, but be careful not to burn yourself.  Diet    Don t force yourself to eat, especially if having cramps, vomiting, or diarrhea.    Water is important so you don't get dehydrated. Soup may also be good. Sports drinks may also help, especially if they are not too acidic. Don't drink sugary drinks as  this can make things worse. Take liquids in small amounts. Don t guzzle them.    Caffeine sometimes makes the pain and cramping worse.    Don t take dairy products if you have vomiting or diarrhea.    Don't eat large amounts at a time. Wait a few minutes between bites.    Eat a diet low in fiber (called a low-residue diet). Foods allowed include refined breads, white rice, fruit and vegetable juices without pulp, tender meats. These foods will pass more easily through the intestine.    Don t have whole-grain foods, whole fruits and vegetables, meats, seeds and nuts, fried or fatty foods, dairy, alcohol and spicy foods until your symptoms go away.  Follow-up care  Follow up with your healthcare provider, or as advised, if your pain does not begin to improve in the next 24 hours.  Call 911  Call 911 if any of these occur:    Trouble breathing    Confusion    Fainting or loss of consciousness    Rapid heart rate    Seizure  When to seek medical advice  Call your healthcare provider right away if any of these occur:    Pain gets worse or moves to the right lower abdomen    New or worsening vomiting or diarrhea    Swelling of the abdomen    Unable to pass stool for more than 3 days    Fever of 100.4 F (38 C) or higher, or as directed by your healthcare provider.    Blood in vomit or bowel movements (dark red or black color)    Yellow color of eyes and skin (jaundice)    Weakness, dizziness    Chest, arm, back, neck, or jaw pain    Unexpected vaginal bleeding or missed period    Can't keep down liquids or water and you are getting dehydrated  [employer] last reviewed this educational content on 6/1/2018 2000-2021 [employer]. All rights reserved. This information is not intended as a substitute for professional medical care. Always follow your healthcare professional's instructions.

## 2022-01-06 ENCOUNTER — APPOINTMENT (OUTPATIENT)
Dept: URGENT CARE | Facility: CLINIC | Age: 37
End: 2022-01-06
Payer: COMMERCIAL

## 2022-01-08 ENCOUNTER — HEALTH MAINTENANCE LETTER (OUTPATIENT)
Age: 37
End: 2022-01-08

## 2022-05-24 ENCOUNTER — OFFICE VISIT (OUTPATIENT)
Dept: OBGYN | Facility: CLINIC | Age: 37
End: 2022-05-24
Payer: COMMERCIAL

## 2022-05-24 VITALS
TEMPERATURE: 98.2 F | SYSTOLIC BLOOD PRESSURE: 130 MMHG | HEART RATE: 100 BPM | DIASTOLIC BLOOD PRESSURE: 85 MMHG | WEIGHT: 129.2 LBS | BODY MASS INDEX: 20.28 KG/M2 | HEIGHT: 67 IN

## 2022-05-24 DIAGNOSIS — Z01.419 ENCOUNTER FOR GYNECOLOGICAL EXAMINATION WITHOUT ABNORMAL FINDING: Primary | ICD-10-CM

## 2022-05-24 DIAGNOSIS — Z31.69 PRE-CONCEPTION COUNSELING: ICD-10-CM

## 2022-05-24 DIAGNOSIS — Z00.00 PREVENTATIVE HEALTH CARE: ICD-10-CM

## 2022-05-24 DIAGNOSIS — Z30.011 ENCOUNTER FOR INITIAL PRESCRIPTION OF CONTRACEPTIVE PILLS: ICD-10-CM

## 2022-05-24 DIAGNOSIS — Z30.432 ENCOUNTER FOR REMOVAL OF INTRAUTERINE CONTRACEPTIVE DEVICE: ICD-10-CM

## 2022-05-24 DIAGNOSIS — Z12.4 CERVICAL CANCER SCREENING: ICD-10-CM

## 2022-05-24 PROCEDURE — G0145 SCR C/V CYTO,THINLAYER,RESCR: HCPCS | Performed by: OBSTETRICS & GYNECOLOGY

## 2022-05-24 PROCEDURE — 82947 ASSAY GLUCOSE BLOOD QUANT: CPT | Performed by: OBSTETRICS & GYNECOLOGY

## 2022-05-24 PROCEDURE — 99385 PREV VISIT NEW AGE 18-39: CPT | Mod: 25 | Performed by: OBSTETRICS & GYNECOLOGY

## 2022-05-24 PROCEDURE — 36415 COLL VENOUS BLD VENIPUNCTURE: CPT | Performed by: OBSTETRICS & GYNECOLOGY

## 2022-05-24 PROCEDURE — 58301 REMOVE INTRAUTERINE DEVICE: CPT | Performed by: OBSTETRICS & GYNECOLOGY

## 2022-05-24 PROCEDURE — 80061 LIPID PANEL: CPT | Performed by: OBSTETRICS & GYNECOLOGY

## 2022-05-24 PROCEDURE — 87624 HPV HI-RISK TYP POOLED RSLT: CPT | Performed by: OBSTETRICS & GYNECOLOGY

## 2022-05-24 PROCEDURE — 99214 OFFICE O/P EST MOD 30 MIN: CPT | Mod: 25 | Performed by: OBSTETRICS & GYNECOLOGY

## 2022-05-24 PROCEDURE — 84443 ASSAY THYROID STIM HORMONE: CPT | Performed by: OBSTETRICS & GYNECOLOGY

## 2022-05-24 RX ORDER — LEVONORGESTREL/ETHIN.ESTRADIOL 0.1-0.02MG
1 TABLET ORAL DAILY
Qty: 90 TABLET | Refills: 4 | Status: SHIPPED | OUTPATIENT
Start: 2022-05-24 | End: 2023-01-03

## 2022-05-24 NOTE — PROGRESS NOTES
Muna RIDER is a 37 year old  female who presents for annual exam.     Menses are absent since having a mirena IUD placed in 2017 after the birth of her second child.  No LMP recorded.. Using IUD for contraception.    Besides routine health maintenance,  she would like to discuss talk about iud removal, freezing eggs, risk of another pregnancy based on her history.  She had a uncomplicated FT pregnancy resulting in  in .  She conceived spontaneously with her second pregnancy at at around 26w she had light bleeding and then continued to have  ctx and on and off cramping until she was admitted at 32 weeks and had fetal bradycardia resulting in STAT c/s with GETA.  The baby had HIE and had been in hospice care for the past 3 years and  very recently.  When she made the appointment she was wondering about freezing her eggs in the event he  at some point in the future and they desired more children.  Now that he has passed away, she is not really sure what they want to do, but just wondering what her risks are for the same/similar thing happening in next pregnancy, what are age based risks and if freezing eggs increases her risk for abruption.  She currently has a mirena, working well for her.  She would like to remove it and start OCPs, which she used prior to her first pregnancy.  She feels this will allow her to stop contraception if/when they desire to try for another pregnancy without having to come for a visit, etc.  GYNECOLOGIC HISTORY:  Menarche: 10  Age at first intercourse: 16 Number of lifetime partners: <5  Muna RIDER is sexually active with male partner(s) and is currently in monogamous relationship.    History sexually transmitted infections:HPV  STI testing offered?  Declined  HARMAN exposure: Unknown  History of abnormal Pap smear: YES - updated in Problem List and Health Maintenance accordingly  Family history of breast CA: Yes (Please explain): pat aunt  Family history of  uterine/ovarian CA: No    Family history of colon CA: No    HEALTH MAINTENANCE:  Cholesterol: (No results found for: CHOL History of abnormal lipids: No  Mammo: 2021 . History of abnormal Mammo: No.  Regular Self Breast Exams: Yes  Calcium/Vitamin D intake: source:  dairy Adequate? Yes  TSH: (No results found for: TSH )  Pap; (No results found for: PAP )    HISTORY:  OB History    Para Term  AB Living   2 2 1 1 0 2   SAB IAB Ectopic Multiple Live Births   0 0 0 0 2      # Outcome Date GA Lbr Hernando/2nd Weight Sex Delivery Anes PTL Lv   2  17 32w2d  1.928 kg (4 lb 4 oz) M CS-LTranv  Y LIVE BIRTH      Complications: Abruptio Placenta      Name: RUFUS ARROYO-CAROLINA RIDER   1 Term 14 40w6d 17:48 / 02:23 3.751 kg (8 lb 4.3 oz) M Vag-Spont EPI N JEFFERY      Name: Reymundo      Apgar1: 8  Apgar5: 9     Past Medical History:   Diagnosis Date     Known health problems: none      Past Surgical History:   Procedure Laterality Date     BIOPSY CERVICAL, LOCAL EXCISION, SINGLE/MULTIPLE        SECTION N/A 2017    LTCS 32 weeks abruption     MOUTH SURGERY       Family History   Problem Relation Age of Onset     Hypertension Mother      Arthritis Mother      Hyperlipidemia Father      No Known Problems Brother      Breast Cancer Paternal Aunt      Diabetes Maternal Grandmother      Cancer Maternal Grandmother      Dementia Paternal Grandmother      Social History     Socioeconomic History     Marital status:      Number of children: 2     Years of education: Post Grad   Tobacco Use     Smoking status: Never Smoker     Smokeless tobacco: Never Used   Substance and Sexual Activity     Alcohol use: No     Drug use: No     Sexual activity: Yes     Partners: Male     Birth control/protection: Condom       Current Outpatient Medications:      levonorgestrel-ethinyl estradiol (AVIANE) 0.1-20 MG-MCG tablet, Take 1 tablet by mouth daily, Disp: 90 tablet, Rfl: 4     levonorgestrel (MIRENA) 20  "mcg/24 hr (5 years) IUD, [LEVONORGESTREL (MIRENA) 20 MCG/24 HR (5 YEARS) IUD] 1 each by Intrauterine route once. Mirena IUD Inserted by Minerva Willson CNM on 11-28-17 (Due for removal by 11-)  [employer]  Lot [id number]  Exp 04/20  NDC 01508-505-76, Disp: , Rfl:      Allergies   Allergen Reactions     Hydrocodone-Acetaminophen Nausea and Vomiting       Past medical, surgical, social and family history were reviewed and updated in EPIC.    ROS:   C:     NEGATIVE for fever, chills, change in weight  I:       NEGATIVE for worrisome rashes, moles or lesions  E:     NEGATIVE for vision changes or irritation  E/M: NEGATIVE for ear, mouth and throat problems  R:     NEGATIVE for significant cough or SOB  CV:   NEGATIVE for chest pain, palpitations or peripheral edema  GI:     NEGATIVE for nausea, abdominal pain, heartburn, or change in bowel habits  :   NEGATIVE for frequency, dysuria, hematuria, vaginal discharge, or irregular bleeding  M:     NEGATIVE for significant arthralgias or myalgia  N:      NEGATIVE for weakness, dizziness or paresthesias  E:      NEGATIVE for temperature intolerance, skin/hair changes  P:      NEGATIVE for changes in mood or affect.    EXAM:  /85   Pulse 100   Temp 98.2  F (36.8  C) (Oral)   Ht 1.689 m (5' 6.5\")   Wt 58.6 kg (129 lb 3.2 oz)   BMI 20.54 kg/m     BMI: Body mass index is 20.54 kg/m .  Constitutional: healthy, alert and no distress  Head: Normocephalic. No masses, lesions, tenderness or abnormalities  Neck: Neck supple. Trachea midline. No adenopathy. Thyroid symmetric, normal size.   Cardiovascular: RRR.   Respiratory: Negative.   Breast: No nodularity, asymmetry or nipple discharge bilaterally.  Gastrointestinal: Abdomen soft, non-tender, non-distended. No masses, organomegaly.  :  Vulva:  No external lesions, normal female hair distribution, no inguinal adenopathy.    Urethra:  Midline, non-tender, well supported, no discharge  Vagina:  " Moist, pink, no abnormal discharge, no lesions  Uterus:  Normal size, non-tender, freely mobile  Ovaries:  No masses appreciated, non-tender, mobile  Rectal Exam: deferred  Musculoskeletal: extremities normal  Skin: no suspicious lesions or rashes  Psychiatric: Affect appropriate, cooperative,mentation appears normal.     COUNSELING:   Reviewed preventive health counseling, as reflected in patient instructions  Special attention given to:        Regular exercise       Healthy diet/nutrition       Contraception       Family planning       Osteoporosis prevention/bone health   reports that she has never smoked. She has never used smokeless tobacco.    Body mass index is 20.54 kg/m .    FRAX Risk Assessment    ASSESSMENT:  37 year old female with satisfactory annual exam  Plan: cotesting done.  Non fasting labs.    2) preconceptual counseling   We discussed her history in detail and I explained she has an increased risk of abruption in another pregnancy based on her history of abruption.  She has no underlying risk factor aside from that that we are aware of, so we discussed potential monitoring in any future pregnancies.  We discussed that even with serial ultrasound monitoring, etc, an abruption could still happen acutely as they are difficult to predict, but if we had any indication of subclinical abruption, we would be following very closely, even possibly monitoring inpatient.   We discussed the pros/cons of egg freezing including cost and success.  If they feel they maybe ready in another 2-3 years, then egg freezing may be beneficial, but if they are planning to try soon, then spontaneous may be best route and much less expensive.  We did briefly discuss decreasing fertility at age 37 and beyond as well as increasing risk for chromosomal abnormalities.  We discussed the option of pre-conceptual MFM consult if desired.     She did decide that she would like her IUD out today and will begin OCPs, and that rx was  faxed with instructions.  If they desire conceiving, she can just stop the OCPs.  Questions answered    NEIDA LEDESMA MD    In addition to health maintenance, 30 min was spent of preconceptual counseling.        IUD Removal:  SUBJECTIVE:    Is a pregnancy test required: No.  Was a consent obtained?  Yes    Muna Tapia is a 37 year old female,, No LMP recorded. who presents today for IUD removal. Her current IUD was placed 5 ago. She has not had problems with the IUD. She requests removal of the IUD because she wants to change her method of contraception    Today's PHQ-2 Score: No flowsheet data found.    PROCEDURE:    A speculum exam was performed and the cervix was visualized. The IUD string was visualized. Using ring forceps, the string  was grasped and the IUD removed intact.    POST PROCEDURE:    The patient tolerated the procedure well. Patient was discharged in stable condition.    Call if bleeding, pain or fever occur. and Birth control counseling given.    Neida Ledesma MD

## 2022-05-25 LAB
CHOLEST SERPL-MCNC: 170 MG/DL
FASTING STATUS PATIENT QL REPORTED: NO
FASTING STATUS PATIENT QL REPORTED: NO
GLUCOSE BLD-MCNC: 83 MG/DL (ref 70–99)
HDLC SERPL-MCNC: 63 MG/DL
LDLC SERPL CALC-MCNC: 93 MG/DL
NONHDLC SERPL-MCNC: 107 MG/DL
TRIGL SERPL-MCNC: 69 MG/DL
TSH SERPL DL<=0.005 MIU/L-ACNC: 1.42 MU/L (ref 0.4–4)

## 2022-05-27 LAB
BKR LAB AP GYN ADEQUACY: NORMAL
BKR LAB AP GYN INTERPRETATION: NORMAL
BKR LAB AP HPV REFLEX: NORMAL
BKR LAB AP PREVIOUS ABNORMAL: NORMAL
PATH REPORT.COMMENTS IMP SPEC: NORMAL
PATH REPORT.COMMENTS IMP SPEC: NORMAL
PATH REPORT.RELEVANT HX SPEC: NORMAL

## 2022-06-02 LAB
HUMAN PAPILLOMA VIRUS 16 DNA: NEGATIVE
HUMAN PAPILLOMA VIRUS 18 DNA: NEGATIVE
HUMAN PAPILLOMA VIRUS FINAL DIAGNOSIS: NORMAL
HUMAN PAPILLOMA VIRUS OTHER HR: NEGATIVE

## 2022-10-27 ENCOUNTER — IMMUNIZATION (OUTPATIENT)
Dept: PEDIATRICS | Facility: CLINIC | Age: 37
End: 2022-10-27
Payer: COMMERCIAL

## 2022-10-27 DIAGNOSIS — Z23 NEED FOR PROPHYLACTIC VACCINATION AND INOCULATION AGAINST INFLUENZA: Primary | ICD-10-CM

## 2022-10-27 PROCEDURE — 90686 IIV4 VACC NO PRSV 0.5 ML IM: CPT

## 2022-10-27 PROCEDURE — 99207 PR NO CHARGE NURSE ONLY: CPT

## 2022-10-27 PROCEDURE — 90471 IMMUNIZATION ADMIN: CPT

## 2022-12-15 ENCOUNTER — TELEPHONE (OUTPATIENT)
Dept: OBGYN | Facility: CLINIC | Age: 37
End: 2022-12-15

## 2022-12-15 DIAGNOSIS — O09.90 HIGH-RISK PREGNANCY, UNSPECIFIED TRIMESTER: Primary | ICD-10-CM

## 2022-12-15 NOTE — TELEPHONE ENCOUNTER
Made her nurse intake but she wants to talk to a nurse about going to [facility] for being high risk.

## 2022-12-16 NOTE — TELEPHONE ENCOUNTER
I don't see a referral that I can sign?  Does she want a consultation or to do her prenatal care as their patient?  Thanks    ALEX ROACH MD

## 2022-12-19 NOTE — TELEPHONE ENCOUNTER
Patient wanted a consultation. She previously had a late term fetal demise.  LMP 11/17   8w4d    Patient would like to continue care here.    Pended a referral - hope its the right one! :)    Isis Barnes RN

## 2022-12-20 ENCOUNTER — TELEPHONE (OUTPATIENT)
Dept: MATERNAL FETAL MEDICINE | Facility: CLINIC | Age: 37
End: 2022-12-20

## 2022-12-29 PROBLEM — S23.9XXA THORACIC SPRAIN: Status: ACTIVE | Noted: 2022-12-29

## 2022-12-29 PROBLEM — T83.32XA IUD STRINGS LOST: Status: ACTIVE | Noted: 2018-10-10

## 2022-12-29 PROBLEM — Z30.431 SURVEILLANCE OF INTRAUTERINE CONTRACEPTIVE DEVICE: Status: ACTIVE | Noted: 2018-10-10

## 2023-01-03 ENCOUNTER — PRENATAL OFFICE VISIT (OUTPATIENT)
Dept: NURSING | Facility: CLINIC | Age: 38
End: 2023-01-03
Payer: COMMERCIAL

## 2023-01-03 VITALS — BODY MASS INDEX: 20.54 KG/M2 | HEIGHT: 67 IN

## 2023-01-03 DIAGNOSIS — O09.219 HIGH RISK PREGNANCY DUE TO HISTORY OF PRETERM LABOR: ICD-10-CM

## 2023-01-03 PROBLEM — S23.9XXA THORACIC SPRAIN: Status: RESOLVED | Noted: 2022-12-29 | Resolved: 2023-01-03

## 2023-01-03 PROBLEM — Z83.49 FAMILY HISTORY OF THYROID DISEASE IN MOTHER: Status: ACTIVE | Noted: 2023-01-03

## 2023-01-03 PROCEDURE — 99207 PR NO CHARGE NURSE ONLY: CPT

## 2023-01-03 NOTE — PROGRESS NOTES
Important Information for Provider:     New ob nurse intake by phone, third pregnancy. Uncomplicated pregnancy . Second pregnancy  at 32 weeks by C section, placenta abruption 2017. Baby was in hospice until 4 years old, recently passed away.   Patient is having a ultrasound 2022 with Dr Parham to call with results. She will be seen by [facility] around 12 weeks for consult and genetic screening.  NOB with Dr Ledesma 2023 ( early nob) but just wanted to see Dr Ledesma before consult  with [facility]. Recommended B6, Unisom for nausea. Patient having normal menstrual like cramping  TSH ordered with NOB labs, family history      Caffeine intake/servings daily - 0  Calcium intake/servings daily - 3  Exercise 5 times weekly - describe ; walks  Sunscreen used - Yes  Seatbelts used - Yes  Guns stored in the home - No  Self Breast Exam - Yes  Pap test up to date -  Yes  Dental exam up to date -  Yes  Immunizations reviewed and up to date - Yes  Abuse: Current or Past (Physical, Sexual or Emotional) - No  Do you feel safe in your environment - Yes  Do you cope well with stress - Yes      Prenatal OB Questionnaire  Patient supplied answers from flow sheet for:  Prenatal OB Questionnaire.  Past Medical History  Have you ever recieved care for your mental health? : No  Have you ever been in a major accident or suffered serious trauma?: No  Within the last year, has anyone hit, slapped, kicked or otherwise hurt you?: No  In the last year, has anyone forced you to have sex when you didn't want to?: No    Past Medical History 2   Have you ever received a blood transfusion?: Yes  Would you accept a blood transfusion if was medically recommended?: Yes  Does anyone in your home smoke?: No   Is your blood type Rh negative?: No  Have you ever ?: (!) Yes  Have you been hospitalized for a nonsurgical reason excluding normal delivery?: No  Have you ever had an abnormal pap smear?: (!) Yes    Past Medical History  (Continued)  Do you have a history of abnormalities of the uterus?: No  Did your mother take HARMAN or any other hormones when she was pregnant with you?: No  Do you have any other problems we have not asked about which you feel may be important to this pregnancy?: No                     Allergies as of 1/3/2023:    Allergies as of 01/03/2023 - Reviewed 05/24/2022   Allergen Reaction Noted     Hydrocodone-acetaminophen Nausea and Vomiting 01/03/2014       Current medications are:  No current outpatient medications on file.         Early ultrasound screening tool:    Does patient have irregular periods?  No  Did patient use hormonal birth control in the three months prior to positive urine pregnancy test? No  Is the patient breastfeeding?  No  Is the patient 10 weeks or greater at time of education visit?  No

## 2023-01-04 ENCOUNTER — ANCILLARY PROCEDURE (OUTPATIENT)
Dept: ULTRASOUND IMAGING | Facility: CLINIC | Age: 38
End: 2023-01-04
Payer: COMMERCIAL

## 2023-01-04 ENCOUNTER — VIRTUAL VISIT (OUTPATIENT)
Dept: OBGYN | Facility: CLINIC | Age: 38
End: 2023-01-04
Payer: COMMERCIAL

## 2023-01-04 DIAGNOSIS — O09.219 HIGH RISK PREGNANCY DUE TO HISTORY OF PRETERM LABOR: ICD-10-CM

## 2023-01-04 DIAGNOSIS — O09.90 HIGH-RISK PREGNANCY, UNSPECIFIED TRIMESTER: Primary | ICD-10-CM

## 2023-01-04 DIAGNOSIS — R35.0 URINARY FREQUENCY: ICD-10-CM

## 2023-01-04 LAB
ABO/RH(D): NORMAL
ANTIBODY SCREEN: NEGATIVE
SPECIMEN EXPIRATION DATE: NORMAL

## 2023-01-04 PROCEDURE — 99213 OFFICE O/P EST LOW 20 MIN: CPT | Mod: TEL | Performed by: OBSTETRICS & GYNECOLOGY

## 2023-01-04 PROCEDURE — 76801 OB US < 14 WKS SINGLE FETUS: CPT | Performed by: OBSTETRICS & GYNECOLOGY

## 2023-01-04 NOTE — PROGRESS NOTES
Telemedicine Visit: The patient's condition can be safely assessed and treated via phone    Reason for Phone Visit: Patient convenience (e.g. access to timely appointments / distance to available provider)    Originating Site (Patient Location): Patient's home    Distant Location (provider location):  On-site    Consent:  The patient/guardian has verbally consented to: the potential risks and benefits of phone versus in person care; bill my insurance or make self-payment for services provided; and responsibility for payment of non-covered services.     Mode of Communication:  phone visit    As the provider I attest to compliance with applicable laws and regulations related to telemedicine.    Telephone Encounter     Start time:1523  Stop time: 1535    [facility]    CC: phone ultrasound review visit    S:Muna Tapia is a 37 year old  at 6w6d by LMP who presents today for routine phone ultrasound review visit.  Patient reports she had some pelvic cramping, no vaginal bleeding.  She has had nausea, but has taken no medications.  She is considering starting B6.  Patient also notes some urinary frequency.      Pregnancy notable for:  -history of  at 32 weeks with placental abruption     O: see imaging tab for ultrasound report.    A&P: Muna Tapia is a 37 year old  at 6w6d by LMP who presents today for routine phone ultrasound review visit.    (O09.90) High-risk pregnancy, unspecified trimester  (primary encounter diagnosis)  Comment: Reviewed with patient ultrasound findings including elongated shape of gestational sac and low normal FHT.  Given these two factors recommend repeat ultrasound in 1-2 weeks to ensure continued normal FHT and fetal growth.  Patient is in agreement with this plan.  Discussed for nausea starting B6 and unisom as needed.    Plan: US OB < 14 Weeks Single    (R35.0) Urinary frequency  Comment: Patient with urinary frequency.  Plan to  rule out UTI  Plan: UA with Microscopic reflex to Culture - lab         collect    Luz Marina Parham MD

## 2023-01-05 ENCOUNTER — LAB (OUTPATIENT)
Dept: LAB | Facility: CLINIC | Age: 38
End: 2023-01-05
Payer: COMMERCIAL

## 2023-01-05 DIAGNOSIS — Z87.59 HISTORY OF PLACENTAL ABRUPTION: ICD-10-CM

## 2023-01-05 DIAGNOSIS — O09.899 HX OF PRETERM DELIVERY, CURRENTLY PREGNANT: Primary | ICD-10-CM

## 2023-01-05 DIAGNOSIS — O09.529 AMA (ADVANCED MATERNAL AGE) MULTIGRAVIDA 35+: ICD-10-CM

## 2023-01-05 DIAGNOSIS — O09.219 HIGH RISK PREGNANCY DUE TO HISTORY OF PRETERM LABOR: ICD-10-CM

## 2023-01-05 DIAGNOSIS — R35.0 URINARY FREQUENCY: ICD-10-CM

## 2023-01-05 LAB
ALBUMIN UR-MCNC: NEGATIVE MG/DL
APPEARANCE UR: CLEAR
BACTERIA #/AREA URNS HPF: NORMAL /HPF
BILIRUB UR QL STRIP: NEGATIVE
COLOR UR AUTO: YELLOW
ERYTHROCYTE [DISTWIDTH] IN BLOOD BY AUTOMATED COUNT: 11.7 % (ref 10–15)
GLUCOSE UR STRIP-MCNC: NEGATIVE MG/DL
HCT VFR BLD AUTO: 36.6 % (ref 35–47)
HGB BLD-MCNC: 12.9 G/DL (ref 11.7–15.7)
HGB UR QL STRIP: NEGATIVE
KETONES UR STRIP-MCNC: NEGATIVE MG/DL
LEUKOCYTE ESTERASE UR QL STRIP: NEGATIVE
MCH RBC QN AUTO: 32 PG (ref 26.5–33)
MCHC RBC AUTO-ENTMCNC: 35.2 G/DL (ref 31.5–36.5)
MCV RBC AUTO: 91 FL (ref 78–100)
NITRATE UR QL: NEGATIVE
PH UR STRIP: 6 [PH] (ref 5–7)
PLATELET # BLD AUTO: 261 10E3/UL (ref 150–450)
RBC # BLD AUTO: 4.03 10E6/UL (ref 3.8–5.2)
RBC #/AREA URNS AUTO: NORMAL /HPF
SP GR UR STRIP: <=1.005 (ref 1–1.03)
UROBILINOGEN UR STRIP-ACNC: 0.2 E.U./DL
WBC # BLD AUTO: 9.2 10E3/UL (ref 4–11)
WBC #/AREA URNS AUTO: NORMAL /HPF

## 2023-01-05 PROCEDURE — 87389 HIV-1 AG W/HIV-1&-2 AB AG IA: CPT

## 2023-01-05 PROCEDURE — 86901 BLOOD TYPING SEROLOGIC RH(D): CPT

## 2023-01-05 PROCEDURE — 81001 URINALYSIS AUTO W/SCOPE: CPT

## 2023-01-05 PROCEDURE — 86850 RBC ANTIBODY SCREEN: CPT

## 2023-01-05 PROCEDURE — 86762 RUBELLA ANTIBODY: CPT

## 2023-01-05 PROCEDURE — 87340 HEPATITIS B SURFACE AG IA: CPT

## 2023-01-05 PROCEDURE — 87086 URINE CULTURE/COLONY COUNT: CPT

## 2023-01-05 PROCEDURE — 85027 COMPLETE CBC AUTOMATED: CPT

## 2023-01-05 PROCEDURE — 86780 TREPONEMA PALLIDUM: CPT

## 2023-01-05 PROCEDURE — 86900 BLOOD TYPING SEROLOGIC ABO: CPT

## 2023-01-05 PROCEDURE — 36415 COLL VENOUS BLD VENIPUNCTURE: CPT

## 2023-01-05 PROCEDURE — 86803 HEPATITIS C AB TEST: CPT

## 2023-01-06 LAB
HBV SURFACE AG SERPL QL IA: NONREACTIVE
HCV AB SERPL QL IA: NONREACTIVE
HIV 1+2 AB+HIV1 P24 AG SERPL QL IA: NONREACTIVE
RUBV IGG SERPL QL IA: 10.6 INDEX
RUBV IGG SERPL QL IA: POSITIVE
T PALLIDUM AB SER QL: NONREACTIVE

## 2023-01-07 LAB — BACTERIA UR CULT: NORMAL

## 2023-01-16 ENCOUNTER — HOSPITAL ENCOUNTER (OUTPATIENT)
Dept: ULTRASOUND IMAGING | Facility: CLINIC | Age: 38
Discharge: HOME | End: 2023-01-16
Attending: OBSTETRICS & GYNECOLOGY | Admitting: OBSTETRICS & GYNECOLOGY
Payer: COMMERCIAL

## 2023-01-16 ENCOUNTER — VIRTUAL VISIT (OUTPATIENT)
Dept: OBGYN | Facility: CLINIC | Age: 38
End: 2023-01-16
Payer: COMMERCIAL

## 2023-01-16 DIAGNOSIS — Z34.90 EARLY STAGE OF PREGNANCY: Primary | ICD-10-CM

## 2023-01-16 DIAGNOSIS — O09.90 HIGH-RISK PREGNANCY, UNSPECIFIED TRIMESTER: ICD-10-CM

## 2023-01-16 PROCEDURE — 76801 OB US < 14 WKS SINGLE FETUS: CPT | Mod: 26 | Performed by: RADIOLOGY

## 2023-01-16 PROCEDURE — 99207 PR NO CHARGE LOS: CPT | Performed by: OBSTETRICS & GYNECOLOGY

## 2023-01-16 PROCEDURE — 76801 OB US < 14 WKS SINGLE FETUS: CPT

## 2023-01-16 NOTE — PROGRESS NOTES
Phone-Visit Details    Type of service:  Phone Visit    Originating Location (pt. Location): Home    Distant Location (provider location): On-site    Mode of Communication: Telephone visit    Telephone time: 4min    S; Muna Tapia is a 37 year old  at 8w4d who had an ultrasound today after us 2 weeks ago showed a somewhat elongated gestational sac.  She is feeling nervous given her last pregnancy and also the questionable first ultrasound.  Otherwise she is doing well.    O: LMP 2022     FINDINGS:      There is a normally shaped gestational sac within the uterus with a  yolk sac and fetal pole. Amniotic fluid is grossly normal for age; ALONSO  not calculated. It is too early to determine placental site. There is  a posterior subserosal fibroid measuring 2.8 x 2.6 x 2.8 cm.     The crown-rump length measures 21 mm corresponding to a gestational  age of 8 weeks, 6 days. Corresponding ROLA is 2023. There is  cardiac activity with a heart rate of 174 bpm.     The right and left ovaries measure 3.7 x 2.1 x 2.2 cm and 3.8 x 2.5 x  1.3 cm, respectively. Both ovaries are normal in appearance. There is  a corpus luteum on the right.                                                                      IMPRESSION:   Single living intrauterine embryo with an ultrasound gestational age  of 21 mm corresponding to an ultrasound ROLA of 2023.      I have personally reviewed the examination and initial interpretation  and I agree with the findings.     JAYLON ESCUDERO MD     A/P: Early stage of pregnancy   We reveiwed her ultrasound report from today which shows a viable IUP with appropriate growth and normal appearing sac.  Reassured her that so far everything is looking good.  Discussed fibroid seen, does not appear to be in area of placental implantation, but will monitor during pregnancy when she has us.  New ob visit scheduled this Friday.    ALEX ROACH MD

## 2023-01-20 ENCOUNTER — PRENATAL OFFICE VISIT (OUTPATIENT)
Dept: OBGYN | Facility: CLINIC | Age: 38
End: 2023-01-20
Payer: COMMERCIAL

## 2023-01-20 VITALS
WEIGHT: 136.4 LBS | SYSTOLIC BLOOD PRESSURE: 119 MMHG | BODY MASS INDEX: 21.69 KG/M2 | OXYGEN SATURATION: 99 % | HEART RATE: 98 BPM | DIASTOLIC BLOOD PRESSURE: 74 MMHG

## 2023-01-20 DIAGNOSIS — O09.90 HIGH-RISK PREGNANCY, UNSPECIFIED TRIMESTER: Primary | ICD-10-CM

## 2023-01-20 DIAGNOSIS — Z87.59 HISTORY OF PLACENTA ABRUPTION: ICD-10-CM

## 2023-01-20 DIAGNOSIS — O34.219 PREVIOUS CESAREAN DELIVERY, ANTEPARTUM CONDITION OR COMPLICATION: ICD-10-CM

## 2023-01-20 PROCEDURE — 99207 PR FIRST OB VISIT: CPT | Performed by: OBSTETRICS & GYNECOLOGY

## 2023-01-21 NOTE — PROGRESS NOTES
CC: New Ob visit  HPI: Carolina Tapia is a 37 year old  here for new Ob visit.  Patient's last menstrual period was 2022..  She is 9w1d by known LMP, giving her an EDC of 23.  The pregnancy was planned and she and her  are feeling excited.    This far the pregnancy has been uneventful other than mild nausea.  She had a normal FT  with her first pregnancy and then PTL/chronic abruption with her second.  Was hospitalized and had bradycardia at 32w, required stat c/s, baby had HIE and had been in hospice care and  recently at age 4.  She thought she would have anxiety starting as she got closer to the time of her last delivery, but is already feeling anxious.  She has a therapist she sees regularly and is doing ok for the most part.  Has consult and GS with M in a few weeks.    Past Medical History:   Diagnosis Date     History of blood transfusion      Sprain of ankle      Varicella        Past Surgical History:   Procedure Laterality Date     BIOPSY CERVICAL, LOCAL EXCISION, SINGLE/MULTIPLE        SECTION N/A 2017    LTCS 32 weeks abruption     MOUTH SURGERY       OB History    Para Term  AB Living   3 2 1 1 0 1   SAB IAB Ectopic Multiple Live Births   0 0 0 0 2      # Outcome Date GA Lbr Hernando/2nd Weight Sex Delivery Anes PTL Lv   3 Current            2  17 32w2d  1.928 kg (4 lb 4 oz) M CS-LTranv  Y LIVE BIRTH      Complications: Abruptio Placenta      Name: CRUZMALE-CAROLINA Swann Term 14 40w6d 17:48 / 02:23 3.751 kg (8 lb 4.3 oz) M Vag-Spont EPI N JEFFERY      Name: Reymundo      Apgar1: 8  Apgar5: 9         No current outpatient medications on file.    Allergies   Allergen Reactions     Hydrocodone-Acetaminophen Nausea and Vomiting       Family History   Problem Relation Age of Onset     Thyroid Disease Mother      Hypertension Mother      Arthritis Mother      Hyperlipidemia Father      Diabetes Maternal Grandmother      Cancer  Maternal Grandmother      Dementia Paternal Grandmother      No Known Problems Brother      Breast Cancer Paternal Aunt        Past medical, social, surgical and family history were reviewed and updated in EPIC.    ROS: No TIA's or unusual headaches, no dysphagia.  No prolonged cough. No dyspnea or chest pain on exertion.  No abdominal pain, change in bowel habits, black or bloody stools.  No urinary tract symptoms.  No new or unusual musculoskeletal symptoms.  Normal menses, no abnormal vaginal bleeding, discharge or unexpected pelvic pain. No new breast lumps, breast pain or nipple discharge.    PE: /74 (BP Location: Left arm, Patient Position: Sitting, Cuff Size: Adult Regular)   Pulse 98   Wt 61.9 kg (136 lb 6.4 oz)   LMP 11/17/2022   SpO2 99%   BMI 21.69 kg/m      Gen: Healthy appearing female in no acute distress  Heart: RRR  Lungs: CTAB  Abd: +BS, SNT  Ex: No C/C/E, no suspicious rashes or lesions    BSUS: live IUP, +FCA, +FM    A/P:  1) IUP at 9w1d, h/o abruption and LTCS        PNL wnl, will check TSH at next blood draw        Reviewed anticipated course of prenatal care        Reviewed recommendations for weight gain, activity and diet        Discussed increased risk of abruption with this pregnancy given her history.  Already has MFM consult scheduled to discuss and get mgmt reccs.  We discussed potential for delivery closer to 37w if feels appropriate when time comes, otherwise plan repeat c/s at 39w.        We discussed COVID 19 in pregnancy and increased risk of more severe disease requiring hospitalization, ventilation and slight increased risk of death.  Possible increased risk of PTL/PTD.  Instructed her to follow safety guidelines strictly, avoid gathering outside of household and work remotely if/when possible.  UTD on vaccines and boosters             We discussed options for genetic screening and diagnosis including CVS/amnio, first trimester screen and quad screen.  We discussed a  fetal survey will be performed around 18-20 weeks. Scheduled for FTS and will plan level 2             Discussed MD call schedule as well as role of residents and med students both in clinic and hospital.  She is ok with resident care        RTC 4 weeks    Neida Ledesma MD

## 2023-02-06 ENCOUNTER — PRE VISIT (OUTPATIENT)
Dept: MATERNAL FETAL MEDICINE | Facility: CLINIC | Age: 38
End: 2023-02-06
Payer: COMMERCIAL

## 2023-02-10 ENCOUNTER — OFFICE VISIT (OUTPATIENT)
Dept: MATERNAL FETAL MEDICINE | Facility: CLINIC | Age: 38
End: 2023-02-10
Attending: STUDENT IN AN ORGANIZED HEALTH CARE EDUCATION/TRAINING PROGRAM
Payer: COMMERCIAL

## 2023-02-10 ENCOUNTER — HOSPITAL ENCOUNTER (OUTPATIENT)
Dept: ULTRASOUND IMAGING | Facility: CLINIC | Age: 38
Discharge: HOME | End: 2023-02-10
Attending: STUDENT IN AN ORGANIZED HEALTH CARE EDUCATION/TRAINING PROGRAM
Payer: COMMERCIAL

## 2023-02-10 ENCOUNTER — LAB (OUTPATIENT)
Dept: LAB | Facility: CLINIC | Age: 38
End: 2023-02-10
Attending: STUDENT IN AN ORGANIZED HEALTH CARE EDUCATION/TRAINING PROGRAM
Payer: COMMERCIAL

## 2023-02-10 DIAGNOSIS — O09.521 MULTIGRAVIDA OF ADVANCED MATERNAL AGE IN FIRST TRIMESTER: Primary | ICD-10-CM

## 2023-02-10 DIAGNOSIS — Z31.438 ENCOUNTER FOR OTHER GENETIC TESTING OF FEMALE FOR PROCREATIVE MANAGEMENT: ICD-10-CM

## 2023-02-10 DIAGNOSIS — O09.521 MULTIGRAVIDA OF ADVANCED MATERNAL AGE IN FIRST TRIMESTER: ICD-10-CM

## 2023-02-10 DIAGNOSIS — O09.899 HX OF PRETERM DELIVERY, CURRENTLY PREGNANT: ICD-10-CM

## 2023-02-10 DIAGNOSIS — Z87.59 HISTORY OF PLACENTAL ABRUPTION: ICD-10-CM

## 2023-02-10 DIAGNOSIS — O09.529 AMA (ADVANCED MATERNAL AGE) MULTIGRAVIDA 35+: ICD-10-CM

## 2023-02-10 PROCEDURE — 76813 OB US NUCHAL MEAS 1 GEST: CPT

## 2023-02-10 PROCEDURE — 96040 HC GENETIC COUNSELING, EACH 30 MINUTES: CPT | Performed by: GENETIC COUNSELOR, MS

## 2023-02-10 PROCEDURE — 76813 OB US NUCHAL MEAS 1 GEST: CPT | Mod: 26 | Performed by: OBSTETRICS & GYNECOLOGY

## 2023-02-10 PROCEDURE — 36415 COLL VENOUS BLD VENIPUNCTURE: CPT

## 2023-02-10 PROCEDURE — 99205 OFFICE O/P NEW HI 60 MIN: CPT | Mod: 25 | Performed by: OBSTETRICS & GYNECOLOGY

## 2023-02-10 NOTE — PROGRESS NOTES
[facility]  Genetic Counseling Consult    Patient:  Muna Tapia YOB: 1985   Date of Service:  2/10/23   MRN: [id number]    Muna was seen at the [facility] [place] for genetic consultation. The indication for genetic counseling is advanced maternal age, desire to discuss options for genetic screening and diagnostics and family history concern. The patient was unaccompanied to this visit.  The patient is wearing a mask due to current [facility] policies.     The session was conducted in English.      IMPRESSION/ PLAN   1. Muna has not had genetic screening in this pregnancy but elected to have screening today.     2. During today's [facility] visit, Muna had a blood draw for NIPS through [facility]. The NIPS screens for trisomy 21, 18, and 13 and the patient opted to screen for sex chromosome aneuploidies, including reported fetal sex. Results are expected in 7-10 days. The patient will be called with results and if they do not answer they requested a detailed message with results on their voicemail, including the predicted fetal sex information.  Patient was informed that results, including fetal sex, will be available in [place] [employer].     3. Muna opted to pursue expanded carrier screening through [facility], including fragile X, which assesses carrier status for 558 conditions. Results are expected within 2-4 weeks, and will be released in [employer]. We will contact the patient to discuss the results, and a copy with be forwarded to the office of the referring OB provider. Muna provided permission for detailed results to be left on her voicemail. Muna gave written permission to discuss her results with her partner, Gabriel, in the event that testing needs to be coordinated for him. Encouraged Muna to discuss concurrent partner testing with Gabriel, as carrier screening results are more timely and information when both partners'  "results are available.     4. Muna also had an MFM physician consult to discuss her OB history today. Please see corresponding documentation for recommendations.     4. Muna had a nuchal translucency ultrasound today. Please see the ultrasound report for further details.    5. Further recommendations include a level II ultrasound with MFM and maternal serum AFP only screening for neural tube defects, if desired (quad screen should NOT be performed). The level II ultrasound has been scheduled for 3/24/2023.     PREGNANCY HISTORY   /Parity:       Muna's pregnancy history is significant for:      full-term delivery, male, alive and well     pre-term delivery, male \"Iván\", . Pregnancy complicated by placenta abruption,  labor, and need for urgent  section. Baby diagnosed with hypoxic ischemic encephalopathy (HIE) and eventually Lennox-Gastaut syndrome. Muna shared that the family pursued an epilepsy panel and whole exome sequencing as Iván's presentation seemed more severe than could be explained by the HIE alone. Muna shared that nothing definitive returned, though there were some variants of uncertain significance that seemed suspicious. Additionally, there was a finding on chromosome 12 that raised some concern, though reportedly Iván's father and many paternal family members underwent testing for this genetic change, were identified to harbor it, and are healthy. Muna signed a release today for me to try and obtain Iván's records. Since variant reclassifications do occur, I will look into the VUSs in the event that they have been reclassified to pathogenic or likely pathogenic and would be clinically actionable in this pregnancy.    CURRENT PREGNANCY   Current Age: 37 year old   Age at Delivery: 38 year old  ROLA: 2023, by Last Menstrual Period                                   Gestational Age: 12w1d  This pregnancy is a single gestation.   This " "pregnancy was conceived spontaneously.    MEDICAL HISTORY   Muna s reported medical history is not expected to impact pregnancy management or risks to fetal development. Muna did share that as part of Iván's whole exome sequencing, she opted in to the [facility] secondary findings. By report, she was not identified to harbor any genetic changes in those genes. Muna also signed a release for me to obtain her genetic testing results.        FAMILY HISTORY   A three-generation pedigree was obtained today and is scanned under the \"Media\" tab in Epic. The family history was reported by Muna RIDER.    The following significant findings were reported today:     Muna has two brothers. One of Muna's brothers has a non-specific heart valve concern, possibly a heart murmur. He has not needed to have any interventions.     Muna has one maternal first-cousin who had childhood epilepsy.     Muna has two male maternal first-cousins once-removed who have autism. She isn't certain if they have ever undergone genetic testing or not. Some forms of autism spectrum disorders can be associated with specific genetic conditions, where we discussed that approximately 15-20% of individuals who have autism will have an identifiable genetic cause for this history.  However, most often these conditions are due to the combination of genes and environmental factors that can affect development.  Since there is a genetic component to autism spectrum disorders, the recurrence risk for other family members is higher among first-degree relatives of the individual with an autism spectrum disorder (full-siblings and children), and decreases with distance in relationship to other second-degree relatives.    Muna has one paternal aunt who had breast cancer diagnosed over the age of 50.      Muna's partner, Gabriel, is 38 and healthy.     Gabriel has two siblings. His sister had childhood epilepsy. Both of Gabriel's siblings have children who are alive " and well.     Gabriel's mother had four siblings, three of who are  due to cancer diagnoses around the age of 50. Muna was not certain of the types of cancers.     Otherwise, the reported family history is unremarkable for multiple miscarriages, stillbirths, birth defects, intellectual disabilities, known genetic conditions, and consanguinity.       CARRIER SCREENING   Expanded carrier screening is available to screen for autosomal recessive conditions and X-linked conditions in a large list of genes. Autosomal recessive conditions happen when a mutation has been inherited from the egg and sperm and include conditions like cystic fibrosis, thalassemia, hearing loss, spinal muscular atrophy, and more. X-linked conditions happen when a mutation has been inherited from the egg and include conditions like fragile X syndrome.  screening was also reviewed. About [place]  Screening    We discussed that expanded carrier screening is designed to identify carrier status for conditions that are primarily childhood or adolescent onset. Expanded carrier screening does not evaluate for adult-onset conditions such as hereditary cancer syndromes, dementia/ Alzheimer's disease, or cardiovascular disease risk factors. Additionally, expanded carrier screening is not comprehensive for all known genetic diseases or inherited conditions. It will not intentionally screen for autosomal dominant conditions. This is a screening test, and residual carrier status risk figures will be provided to the patient after results become available. Carrier screening is not meant to diagnose the patient with a condition, and generally carriers are asymptomatic. However, certain genes may confer increased risks for various health concerns in carriers (including, but not limited to: BRITANY, DMD, FMR1).    We discussed that carrier screening can have implications for other family members and that couples are encouraged to share positive results  with siblings and other family members of reproductive age. Additionally, even if there is not a high reproductive risk for a condition, it is possible that carrier status can be passed on to future generations.    We reviewed that there is a law in place, the Genetic Information Nondiscrimination Act (NAHID), that protects patients from discrimination by health insurance companies and employers based on their genetic information. NAHID does not protect against discrimination by life insurance companies or disability insurance.    We reviewed that carrier screening will report on variants classified by the lab as pathogenic or likely pathogenic. Although carrier status does not change over time, it is possible that a variant could be reclassified as more information about the variant is learned. If this occurs, the couple will be contacted and a new risk assessment will be provided.     Muna opted to proceed with comprehensive carrier screening through [facility]. This includes 558 autosomal recessive and x-linked conditions. We discussed the timeline of the results and that they are most informative if the other reproductive partner also pursues testing. Muna will discuss further with Gabriel and will reach out if he would like to proceed with testing prior to Muna's results returning.        RISK ASSESSMENT FOR CHROMOSOME CONDITIONS   We explained that the risk for fetal chromosome abnormalities increases with maternal age. We discussed specific features of common chromosome abnormalities, including Down syndrome, trisomy 13, trisomy 18, and sex chromosome trisomies.      At age 38 at midtrimester, the risk to have a baby with Down syndrome is 1 in 98.     At age 38 at midtrimester, the risk to have a baby with any chromosome abnormality is 1 in 51.     Muna RIDER has not had genetic screening in this pregnancy but elected to have screening today.      GENETIC TESTING OPTIONS   Genetic testing during a pregnancy  includes screening and diagnostic procedures.      Screening tests are non-invasive which means no risk to the pregnancy and includes ultrasounds and blood work. The benefits and limitations of screening were reviewed. Screening tests provide a risk assessment (chance) specific to the pregnancy for certain fetal chromosome abnormalities but cannot definitively diagnose or exclude a fetal chromosome abnormality. Follow-up genetic counseling and consideration of diagnostic testing is recommended with any abnormal screening result. Diagnostic testing during a pregnancy is more certain and can test for more conditions. However, the tests do have a risk of miscarriage that requires careful consideration. These tests can detect fetal chromosome abnormalities with greater than 99% certainty. Results can be compromised by maternal cell contamination or mosaicism and are limited by the resolution of current genetic testing technology.     There is no screening or diagnostic test that detects all forms of birth defects or intellectual disability.     We discussed the following screening options:   Non-invasive prenatal testing (NIPT)    Also called cell-free DNA screening because it detect chromosome fragments from the placenta in the pregnant person's blood    Can be done any time after 10 weeks gestation    Screens for trisomy 21, trisomy 18, trisomy 13, and sex chromosome aneuploidies    Cannot screen for open neural tube defects, maternal serum AFP after 15 weeks is recommended      We discussed the following ultrasound options:  Nuchal translucency (NT) ultrasound    Ultrasound between 09x9x-20p5d that includes nuchal translucency measurement and nasal bone assessments    Nuchal translucency refers to the space at the back of the neck where fluid builds up. This is a normal finding and there is only concern if there is more fluid than expected    Nasal bone refers to the small bone in the nose. There is concern for  conditions like Down syndrome if the bone cannot be seen at all    This ultrasound can be done as part of first trimester screening, at the same time as another screen (NIPT), at the same time as a CVS, or if the patients does not want serum screening.    Markers on ultrasound detects about 70% of pregnancies with aneuploidy    Increased NT measurements can also increase the risk for a birth defect, like a heart defect    Comprehensive level II ultrasound (Fetal Anatomy Ultrasound)    Ultrasound done between 18-20 weeks gestation    Screens for major birth defects and markers for aneuploidy (like trisomy 21 and trisomy 18)    Includes assessment of the fetal growth, internal organs, placenta, and amniotic fluid      We discussed the following diagnostic options:   Chorionic villus sampling (CVS)    Invasive diagnostic procedure done between 10w0d and 13w6d    The procedure collects a small sample from the placenta for the purpose of chromosomal testing and/or other genetic testing    Diagnostic result; more than 99% sensitivity for fetal chromosome abnormalities    Cannot screen for open neural tube defects, maternal serum AFP after 15 weeks is recommended    Amniocentesis    Invasive diagnostic procedure done after 15 weeks gestation    The procedure collects a small sample of amniotic fluid for the purpose of chromosomal testing and/or other genetic testing    Diagnostic result; more than 99% sensitivity for fetal chromosome abnormalities    Testing for AFP in the amniotic fluid can test for open neural tube defects        It was a pleasure to be involved with Muna RIDER s [facility]. Face-to-face time of the meeting was 45 minutes.    Abimbola Olivares GC, MS, C  Certified and [place] Licensed Genetic Counselor  [facility]  Maternal Fetal Medicine  Office: [phone]  [facility]: [phone]   Fax: [phone]  [facility]

## 2023-02-15 NOTE — PROGRESS NOTES
"Please see \"Imaging\" tab under \"Chart Review\" for details of today's visit.    Colin Sesay    "

## 2023-02-16 ENCOUNTER — TELEPHONE (OUTPATIENT)
Dept: MATERNAL FETAL MEDICINE | Facility: CLINIC | Age: 38
End: 2023-02-16
Payer: COMMERCIAL

## 2023-02-16 NOTE — TELEPHONE ENCOUNTER
February 16, 2023    I spoke with Muna RIDER regarding her NIPT results.     Results indicate NO ANEUPLOIDY DETECTED for chromosomes 21, 18, 13, or the sex chromosomes. The predicted fetal sex is female.     This puts her current pregnancy at low risk for Down syndrome, trisomy 18, trisomy 13 and sex chromosome abnormalities. This test is reported to have the following sensitivities: Down syndrome- >99.9%, trisomy 18- >99.9%, and trisomy 13- >99.9%. Although these results are reassuring, this does not replace a standard chromosome analysis from a chorionic villus sampling or amniocentesis.     Level II ultrasound is recommended, given AMA.     MSAFP is the appropriate second trimester screening test for open neural tube defects; the maternal quad screen is not recommended.    Her results are available in her Epic chart for her primary OB to review.    Carrier screening for Sharon is still pending. Gabriel was also present on the call today and we again reviewed the panel of conditions for which Sharon is being screened, timeline of getting results back, and potential implications for carriers. Discussed [place] at length. Gabriel is still not certain if he would want to proceed with testing. The couple will call me in the event that Gabriel would like to get testing prior to Sharon's results returning.      Abimbola Olivares MS, [facility]  Licensed Genetic Counselor  [facility]  Maternal Fetal Medicine  [email or web address]  [phone]

## 2023-02-21 ENCOUNTER — TELEPHONE (OUTPATIENT)
Dept: MATERNAL FETAL MEDICINE | Facility: CLINIC | Age: 38
End: 2023-02-21
Payer: COMMERCIAL

## 2023-02-21 NOTE — TELEPHONE ENCOUNTER
2023    I called Muna RIDER to discuss the results of her carrier screening. Her partner has not had testing.    Muna screened negative for 557 of the 558 conditions. This significantly reduces the couple's reproductive risk for this group of conditions.    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 This condition is characterized by an inability for the kidneys to filter waste from the blood.     This condition typically leads to end-stage renal disease by early childhood requiring a kidney transplant, though this variant is generally not associated with childhood-onset forms of the condition.     [facility] reports that this variant is generally not associated with disease in the homozygous state, but can be pathogenic if inherited with another known pathogenic mutation. The combination of this variant with certain other NPHS2 variants typically results in a later-onset form of the condition that is slower progressing.     The carrier frequency of this condition is ~1 in 500. Since Sharon's partner has not been tested, the current reproductive risk is ~1 in .     Muna will discuss further with Gabriel to determine if he would like to get tested for this condition only and will let me know if he opts to proceed.      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 Pseudodeficiency alleles are NOT associated with carrier status or disease, but can exhibit false positive results on biochemical tests such as  screening.      Abimbola Olivares MS, [facility]  Licensed Genetic Counselor  [facility]  Maternal Fetal Medicine  [email or web address]  [phone]

## 2023-02-22 LAB — SCANNED LAB RESULT: NORMAL

## 2023-02-27 ENCOUNTER — PRENATAL OFFICE VISIT (OUTPATIENT)
Dept: OBGYN | Facility: CLINIC | Age: 38
End: 2023-02-27
Payer: COMMERCIAL

## 2023-02-27 VITALS
HEART RATE: 102 BPM | BODY MASS INDEX: 21.97 KG/M2 | DIASTOLIC BLOOD PRESSURE: 87 MMHG | WEIGHT: 140 LBS | OXYGEN SATURATION: 100 % | SYSTOLIC BLOOD PRESSURE: 138 MMHG | HEIGHT: 67 IN

## 2023-02-27 DIAGNOSIS — O34.219 PREVIOUS CESAREAN DELIVERY, ANTEPARTUM CONDITION OR COMPLICATION: Primary | ICD-10-CM

## 2023-02-27 PROCEDURE — 99207 PR PRENATAL VISIT: CPT | Performed by: OBSTETRICS & GYNECOLOGY

## 2023-02-27 NOTE — PROGRESS NOTES
14w4d overall feeling ok, some RLP and low abd dull ache, reassured.  Was in [place] and briefly exposed to red tide algae bloom.  Had a slight cough when walking on the beach, but no residual symptoms and did not ingest contaminated fish.  Discussed no data easily found to suggest fetal harm.  Spoke with Dr. Sesay and she also could not find and fetal risk in typical sources, did not think it was concerning.  This was relayed to jenae.  AFP next visit.  Level 2 scheduled.  oni

## 2023-03-22 ENCOUNTER — VIRTUAL VISIT (OUTPATIENT)
Dept: OBGYN | Facility: CLINIC | Age: 38
End: 2023-03-22
Payer: COMMERCIAL

## 2023-03-22 DIAGNOSIS — O98.512 COVID-19 AFFECTING PREGNANCY IN SECOND TRIMESTER: Primary | ICD-10-CM

## 2023-03-22 DIAGNOSIS — U07.1 COVID-19 AFFECTING PREGNANCY IN SECOND TRIMESTER: Primary | ICD-10-CM

## 2023-03-22 PROCEDURE — 99207 PR PRENATAL VISIT: CPT | Mod: TEL | Performed by: OBSTETRICS & GYNECOLOGY

## 2023-03-22 RX ORDER — PRENATAL VIT/IRON FUM/FOLIC AC 27MG-0.8MG
1 TABLET ORAL DAILY
COMMUNITY
End: 2024-06-26

## 2023-03-22 NOTE — PROGRESS NOTES
Muna RIDER is a 38 year old who is being evaluated via a billable telephone visit.      What phone number would you like to be contacted at? [phone]  How would you like to obtain your AVS? Chandrika    Distant Location (provider location):  On-site    Patient location: home    17w6d began having mild cold symptoms 4 days ago, covid test turned positive yesterday.  Worst symptoms last night with body aches, URI and now a little better.  Some pelvic discomfort in the tailbone and SI joint.  Feeling mvmt.      We discussed OTC meds she can use for UR symptoms as well as body aches.  Discussed heat and massage for back and SI joint pain.  Discussed paxlovid, safety in pregnancy, typical side effects and risk of rebound.  For now, she will hold off since she is feeling ok.  If she decides she would like to take, understands she should let me know today to begin by day 5 (tomorrow).  Had MF us scheduled for Friday, has been rescheduled to 4/4.  Offered PNV next wed or beyond and she would like that, will schedule.    Reassured her she can call or come in with any concerns, even if within the 10 day window.  Discussed recommendation for growth us in 3rd tri for covid, and she will already have that scheduled.  Questions answered    ALEX ROACH MD      Phone call duration: 16 minutes

## 2023-04-04 ENCOUNTER — HOSPITAL ENCOUNTER (OUTPATIENT)
Dept: ULTRASOUND IMAGING | Facility: CLINIC | Age: 38
Discharge: HOME | End: 2023-04-04
Attending: OBSTETRICS & GYNECOLOGY
Payer: COMMERCIAL

## 2023-04-04 ENCOUNTER — OFFICE VISIT (OUTPATIENT)
Dept: MATERNAL FETAL MEDICINE | Facility: CLINIC | Age: 38
End: 2023-04-04
Attending: OBSTETRICS & GYNECOLOGY
Payer: COMMERCIAL

## 2023-04-04 DIAGNOSIS — O09.521 MULTIGRAVIDA OF ADVANCED MATERNAL AGE IN FIRST TRIMESTER: ICD-10-CM

## 2023-04-04 DIAGNOSIS — Z87.59 HISTORY OF PLACENTAL ABRUPTION: Primary | ICD-10-CM

## 2023-04-04 DIAGNOSIS — Z36.89 ENCOUNTER FOR FETAL ANATOMIC SURVEY: ICD-10-CM

## 2023-04-04 PROCEDURE — 76811 OB US DETAILED SNGL FETUS: CPT | Mod: 26 | Performed by: STUDENT IN AN ORGANIZED HEALTH CARE EDUCATION/TRAINING PROGRAM

## 2023-04-04 PROCEDURE — 76811 OB US DETAILED SNGL FETUS: CPT

## 2023-04-04 PROCEDURE — 99213 OFFICE O/P EST LOW 20 MIN: CPT | Mod: 25 | Performed by: STUDENT IN AN ORGANIZED HEALTH CARE EDUCATION/TRAINING PROGRAM

## 2023-04-04 NOTE — NURSING NOTE
Patient reports sciatica pain-encouraged to let primary OB know, denies contractions, leaking of fluid, or bleeding. SBAR given to GAVIN HEAD, see their note in Epic.

## 2023-04-04 NOTE — PROGRESS NOTES
Please see the full imaging report from the ViewPoint program under the imaging tab.    Emerald Montez MD  Maternal Fetal Medicine

## 2023-04-18 ENCOUNTER — PRENATAL OFFICE VISIT (OUTPATIENT)
Dept: OBGYN | Facility: CLINIC | Age: 38
End: 2023-04-18
Payer: COMMERCIAL

## 2023-04-18 VITALS
SYSTOLIC BLOOD PRESSURE: 125 MMHG | BODY MASS INDEX: 23.69 KG/M2 | WEIGHT: 149 LBS | DIASTOLIC BLOOD PRESSURE: 70 MMHG | HEART RATE: 92 BPM

## 2023-04-18 DIAGNOSIS — O34.219 PREVIOUS CESAREAN DELIVERY, ANTEPARTUM CONDITION OR COMPLICATION: ICD-10-CM

## 2023-04-18 DIAGNOSIS — O09.90 HIGH-RISK PREGNANCY, UNSPECIFIED TRIMESTER: Primary | ICD-10-CM

## 2023-04-18 PROCEDURE — 99207 PR PRENATAL VISIT: CPT | Performed by: OBSTETRICS & GYNECOLOGY

## 2023-04-18 NOTE — PROGRESS NOTES
21w5d overall feeling good and recovered from COVID.  Good fm.  Had normal level 2, velamentous cord, discussed.  Discussed c/s, pre-op, intra-op (patient focused) and recovery room.  Will try to coordinate next visit with next us and do glucola that day.  RTC 3-4 weeks  oni

## 2023-04-27 ENCOUNTER — HOSPITAL ENCOUNTER (OUTPATIENT)
Facility: CLINIC | Age: 38
Discharge: HOME | End: 2023-04-28
Attending: OBSTETRICS & GYNECOLOGY | Admitting: OBSTETRICS & GYNECOLOGY
Payer: COMMERCIAL

## 2023-04-27 ENCOUNTER — NURSE TRIAGE (OUTPATIENT)
Dept: NURSING | Facility: CLINIC | Age: 38
End: 2023-04-27
Payer: COMMERCIAL

## 2023-04-27 VITALS — TEMPERATURE: 98.1 F | DIASTOLIC BLOOD PRESSURE: 86 MMHG | RESPIRATION RATE: 18 BRPM | SYSTOLIC BLOOD PRESSURE: 130 MMHG

## 2023-04-27 LAB
ALBUMIN UR-MCNC: NEGATIVE MG/DL
APPEARANCE UR: CLEAR
BILIRUB UR QL STRIP: NEGATIVE
CLUE CELLS: ABNORMAL
COLOR UR AUTO: NORMAL
GLUCOSE UR STRIP-MCNC: NEGATIVE MG/DL
HGB UR QL STRIP: NEGATIVE
KETONES UR STRIP-MCNC: NEGATIVE MG/DL
LEUKOCYTE ESTERASE UR QL STRIP: NEGATIVE
NITRATE UR QL: NEGATIVE
PH UR STRIP: 6 [PH] (ref 5–7)
RBC URINE: 1 /HPF
SP GR UR STRIP: 1 (ref 1–1.03)
TRICHOMONAS, WET PREP: ABNORMAL
UROBILINOGEN UR STRIP-MCNC: NORMAL MG/DL
WBC URINE: 1 /HPF
WBC'S/HIGH POWER FIELD, WET PREP: ABNORMAL
YEAST, WET PREP: ABNORMAL

## 2023-04-27 PROCEDURE — G0463 HOSPITAL OUTPT CLINIC VISIT: HCPCS

## 2023-04-27 PROCEDURE — 87653 STREP B DNA AMP PROBE: CPT

## 2023-04-27 PROCEDURE — 87591 N.GONORRHOEAE DNA AMP PROB: CPT

## 2023-04-27 PROCEDURE — 81001 URINALYSIS AUTO W/SCOPE: CPT

## 2023-04-27 PROCEDURE — 87210 SMEAR WET MOUNT SALINE/INK: CPT

## 2023-04-27 ASSESSMENT — ACTIVITIES OF DAILY LIVING (ADL): ADLS_ACUITY_SCORE: 31

## 2023-04-28 ENCOUNTER — HOSPITAL ENCOUNTER (OUTPATIENT)
Facility: CLINIC | Age: 38
End: 2023-04-28
Admitting: OBSTETRICS & GYNECOLOGY
Payer: COMMERCIAL

## 2023-04-28 LAB
APTT PPP: 26 SECONDS (ref 22–38)
C TRACH DNA SPEC QL PROBE+SIG AMP: NEGATIVE
ERYTHROCYTE [DISTWIDTH] IN BLOOD BY AUTOMATED COUNT: 12.5 % (ref 10–15)
FIBRINOGEN PPP-MCNC: 330 MG/DL (ref 170–490)
HCT VFR BLD AUTO: 35.3 % (ref 35–47)
HGB BLD-MCNC: 12.1 G/DL (ref 11.7–15.7)
INR PPP: 0.92 (ref 0.85–1.15)
MCH RBC QN AUTO: 32.9 PG (ref 26.5–33)
MCHC RBC AUTO-ENTMCNC: 34.3 G/DL (ref 31.5–36.5)
MCV RBC AUTO: 96 FL (ref 78–100)
N GONORRHOEA DNA SPEC QL NAA+PROBE: NEGATIVE
PLATELET # BLD AUTO: 258 10E3/UL (ref 150–450)
RBC # BLD AUTO: 3.68 10E6/UL (ref 3.8–5.2)
WBC # BLD AUTO: 11.9 10E3/UL (ref 4–11)

## 2023-04-28 PROCEDURE — 85610 PROTHROMBIN TIME: CPT

## 2023-04-28 PROCEDURE — 36415 COLL VENOUS BLD VENIPUNCTURE: CPT

## 2023-04-28 PROCEDURE — 85730 THROMBOPLASTIN TIME PARTIAL: CPT

## 2023-04-28 PROCEDURE — 85027 COMPLETE CBC AUTOMATED: CPT

## 2023-04-28 PROCEDURE — G0463 HOSPITAL OUTPT CLINIC VISIT: HCPCS

## 2023-04-28 PROCEDURE — 85384 FIBRINOGEN ACTIVITY: CPT

## 2023-04-28 NOTE — PLAN OF CARE
Wet prep and UA results WDL, abruption labs sent. See flowsheets for fetal monitoring. Per Dr Toledo and Dr Ledesma plan to discharge home and follow up in clinic. Discharge instructions reviewed with patient, verbalized understanding. Patient discharged ambulatory with spouse at 0025.

## 2023-04-28 NOTE — TELEPHONE ENCOUNTER
OB Triage Call      Is patient's OB/Midwife with the formerly [facility] or [facility]? [facility]- Is patient's primary OB a Midwife? No- Proceed with triage.     Reason for call: Pt called with c/o abdominal pain.    Assessment: Pt states she was having mathew che contractions and felt 'flushed, nauseous, and woozy' while walking yesterday. She has been 'taking it easy' since. Today she is c/o 4/10 abdominal pain which goes from her belly button up the middle and to the right side of her abdomen. The pain is keeping her from her regular activities. She also states she is having 'darker' discharge possibly containing blood. Positive for fetal movement.    Plan: Dispo to go to L&D.    Patient plans to deliver at [facility]    Patient's primary OB Provider is Dr. Ledesma.    Per protocol recommendations Patient to be evaluated in L&D.  Patient plans to deliver at Delivering [facility]: [facility] ([phone]).  [facility] and Deliver notified of patient's pending arrival.  Report given to charge RN.      Is patient's delivering hospital on divert? No      23w0d    Estimated Date of Delivery: Aug 24, 2023        OB History    Para Term  AB Living   3 2 1 1 0 1   SAB IAB Ectopic Multiple Live Births   0 0 0 0 2      # Outcome Date GA Lbr Hernando/2nd Weight Sex Delivery Anes PTL Lv   3 Current            2  17 32w2d  1.928 kg (4 lb 4 oz) M CS-LTranv  Y LIVE BIRTH      Complications: Abruptio Placenta      Name: CRUZMALE-CAROLINA I   1 Term 14 40w6d 17:48 / 02:23 3.751 kg (8 lb 4.3 oz) M Vag-Spont EPI N JEFFERY      Name: Reymundo      Apgar1: 8  Apgar5: 9       No results found for: GBS       Reason for Disposition    MODERATE-SEVERE abdominal pain (e.g., interferes with normal activities, awakens from sleep)    Additional Information    Negative: [1] SEVERE abdominal pain (e.g., excruciating) AND [2] constant AND [3] present > 1 hour    Negative: Sounds like a  "life-threatening emergency to the triager    Negative: SEVERE vaginal bleeding (e.g., continuous red blood from vagina, large blood clots)    Negative: Passed out (i.e., lost consciousness, collapsed and was not responding)    Negative: Shock suspected (e.g., cold/pale/clammy skin, too weak to stand, low BP, rapid pulse)    Negative: Difficult to awaken or acting confused (e.g., disoriented, slurred speech)    Negative: [1] Vomiting AND [2] contains red blood or black (\"coffee ground\") material  (Exception: few red streaks in vomit that only happened once)    Protocols used: PREGNANCY - ABDOMINAL PAIN GREATER THAN 20 WEEKS IVAACHUCK Feldman RN  [facility] Nurse Advisor   4/27/2023  8:58 PM  "

## 2023-04-28 NOTE — DISCHARGE INSTRUCTIONS
Discharge Instruction for Undelivered Patients      You were seen for:  Abdominal pain  We Consulted: Dr. Toledo and Dr Ledesma  You had (Test or Medicine): Abruption labs, wet prep, GC/CHLAM, urinalysis     Diet:   As tolerated     Activity:  As Tolerated     Call your provider if you notice:  Swelling in your face or increased swelling in your hands or legs.  Headaches that are not relieved by Tylenol (acetaminophen).  Changes in your vision (blurring: seeing spots or stars.)  Nausea (sick to your stomach) and vomiting (throwing up).   Weight gain of 5 pounds or more per week.  Heartburn that doesn't go away.  Signs of bladder infection: pain when you urinate (use the toilet), need to go more often and more urgently.  The bag of khan (rupture of membranes) breaks, or you notice leaking in your underwear.  Bright red blood in your underwear.  Abdominal (lower belly) or stomach pain.  Second (plus) baby: Contractions (tightening) less than 10 minutes apart and getting stronger.  *If less than 34 weeks: Contractions (tightening) more than 6 times in one hour.  Increase or change in vaginal discharge (note the color and amount)      Follow-up:  As scheduled in the clinic  Call clinic tomorrow to be seen next week in clinic!

## 2023-04-28 NOTE — PLAN OF CARE
"OB TRIAGE NOTE    Data: Patient admitted to room 448-2 at 2150. Patient is a . Prenatal record reviewed.   OB History    Para Term  AB Living   3 2 1 1 0 1   SAB IAB Ectopic Multiple Live Births   0 0 0 0 2      # Outcome Date GA Lbr Hernando/2nd Weight Sex Delivery Anes PTL Lv   3 Current            2  17 32w2d  1.928 kg (4 lb 4 oz) M CS-LTranv  Y LIVE BIRTH      Complications: Abruptio Placenta      Name: RUFUS ARROYO-CAROLINA I   1 Term 14 40w6d 17:48 / 02:23 3.751 kg (8 lb 4.3 oz) M Vag-Spont EPI N JEFFERY      Name: Reymundo      Apgar1: 8  Apgar5: 9   .  Medical History:   Past Medical History:   Diagnosis Date     History of blood transfusion      Sprain of ankle      Varicella    .  Gestational age 23w0d. Vital signs per doc flowsheet. Fetal movement present. Patient reports Abdominal Pain   as reason for admission. She states that she started to have \"stomach pain\" after eating yesterday and began to feel flushed. She states that this pain passed after about two hours and she noticed several mathew che contractions. Today, she reports that that she has been having pain on her upper right abdomen. It is tender upon palpation. She also notices that this pain occurs when she moves and is ambulating. She reports it as a sharp pain when she moves. She reports that she has been more physically active than normal this week doing yoga. This evening, she wiped after urinating and noticed some brown discharge. She reports feeling very concerned due to her placental abruption in her last pregnancy. Patient denies LOF or tawana red bleeding. She also states that she has felt some pressure in her lower abdomen that makes her feel like she needs to urinate often. Spouse present for support. No contractions noted by TOCO or palpation. Patient denies feeling any contractions at this point. FHRT reactive and reassuring.  Action: Verbal consent for EFM, external fetal monitors applied. Admission " assessment completed. Patient and support persons educated on labor process. Patient instructed to report change in fetal movement, contractions, vaginal leaking of fluid or bleeding, abdominal pain, or any concerns related to the pregnancy to her nurse/physician. Patient oriented to room, call light in reach.   Response: Dr. Toledo informed of patient arrival and findings. Plan per provider is collecting swabs, speculum exam, and she will consult Dr. Ledesma for further orders. Patient verbalized understanding of education and verbalized agreement with plan.

## 2023-04-28 NOTE — PROGRESS NOTES
[facility]  Labor & Delivery Triage Note    HPI: Muna Tapia is a 38 year old  at 23w0d by 6w5d US who presents today for evaluation of abdominal pain.Thought this abdominal pain may have been attributed to a slightly different diet recently compared to baseline (ate meat, candy) but noticed that the pain persisted.  She describes this as a tight/occasionally crampy pain that initially started by her epigastric area but then began to radiate to her right upper and left upper abdomen.  The pain is intermittent, and brief, and seems to worsen with movement.  The other thing she mentions is that she has been trying to do more frequent light exercising (walking, yoga) and initially thought may be pain was due to recent increase in exercise.  The pain continued to persist all day yesterday, and all day today.  Patient noticed the pain more acutely when she would go from laying down to sitting up or standing up positions.  She did not feel the pain when she would lay still.  She used the bathroom earlier this afternoon and upon wiping noticed a very small amount of brown color intermixed with her vaginal discharge.  Given her pain and discharge she decided to present to triage area for further evaluation.  Currently patient reports feeling 1 episode of pain when she went from laying down to sitting up while in triage area.  She reports good fetal movement.  Denies tawana vaginal bleeding, or leakage of fluids.  She has otherwise been feeling well recently denying fever, chills, chest pain, shortness of breath, bowel or bladder complaints, headache, blurry or double vision.  Denies presence of painful contractions.  Patient does acknowledge history of placental abruption with last pregnancy and wanting to be cautious now.    Pregnancy notable for:  -History of placental abruption  -History of  delivery  -Recent death of child  -TIANA/MDD  -History of   section x1      Lab Results   Component Value Date    AS Negative 2023    HEPBANG Nonreactive 2023    RPR Non-Reactive 2017    HGB 12.1 2023       GBS Status: n/a    OBHX:  OB History    Para Term  AB Living   3 2 1 1 0 1   SAB IAB Ectopic Multiple Live Births   0 0 0 0 2      # Outcome Date GA Lbr Hernando/2nd Weight Sex Delivery Anes PTL Lv   3 Current            2  17 32w2d  1.928 kg (4 lb 4 oz) M CS-LTranv  Y LIVE BIRTH      Complications: Abruptio Placenta      Name: CRUZMALE-KAITIN I   1 Term 14 40w6d 17:48 / 02:23 3.751 kg (8 lb 4.3 oz) M Vag-Spont EPI N JEFFERY      Name: Reymundo      Apgar1: 8  Apgar5: 9       Medical Hx:  Past Medical History:   Diagnosis Date     History of blood transfusion      Sprain of ankle      Varicella        Surgical Hx:  Past Surgical History:   Procedure Laterality Date     BIOPSY CERVICAL, LOCAL EXCISION, SINGLE/MULTIPLE        SECTION N/A 2017    LTCS 32 weeks abruption     MOUTH SURGERY         Medications:  Current Outpatient Medications   Medication Instructions     Prenatal Vit-Fe Fumarate-FA (PRENATAL MULTIVITAMIN W/IRON) 27-0.8 MG tablet 1 tablet, Oral, DAILY       Allergies:  Allergies   Allergen Reactions     Hydrocodone-Acetaminophen Nausea and Vomiting       FMHx:    Family History   Problem Relation Age of Onset     Thyroid Disease Mother      Hypertension Mother      Arthritis Mother      Hyperlipidemia Father      Diabetes Maternal Grandmother      Cancer Maternal Grandmother      Dementia Paternal Grandmother      No Known Problems Brother      Breast Cancer Paternal Aunt        Social Hx:  Social History     Tobacco Use     Smoking status: Never     Smokeless tobacco: Never   Vaping Use     Vaping status: Never Used   Substance Use Topics     Alcohol use: No     Drug use: No         ROS: 10-point ROS negative except as in HPI.    Physical Exam:  Vitals:    23 2206   BP: 130/86   BP  Location: Left arm   Patient Position: Sitting   Cuff Size: Adult Regular   Resp: 18   Temp: 98.1  F (36.7  C)   TempSrc: Oral     GEN: resting comfortably in bed, NAD.   at bedside  CV: Regular rate, well perfused  PULM: On room air, no increased work of breathing  ABD: soft, gravid, non-tender, non-distended  EXT: No BLE edema  SSE: Cervix appears visually closed.  No evidence of current or recent vaginal bleeding.  Physiologic discharge.    NST:  FHT: baseline 145, moderate variability, + accels, - decels  TOCO: 0 ctx in ten minutes    A/P: 38 year old  at 23w0d by 6w5d US who presented to triage for evaluation of abdominal pain.  Patient has history significant for placental abruption resulting in  delivery.  Acknowledges this history on admission tonight and wanting to be overly cautious given abdominal pain.  Abdominal pain began yesterday and have been persistent throughout the day today.  Intermittent.  Thought she saw some brown vaginal discharge today prompting her presentation to triage for evaluation.  Here in triage area FHT is category 1, reactive, reassuring, and appropriate for gestational age.  [place] was quiet.  Speculum examination performed and notable for visually closed cervix, no obvious signs of active or recent vaginal bleeding.  Wet prep and UA negative. Abruption labs collected at patient request and also found to be negative (Patient discharged home before these labs resulted and would like to discuss this at next prenatal care visit). Following evaluation patient presenting presenting symptoms most consistent with possible musculoskeletal pain though broader differential would include possible placental abruption, possible  labor.  No evidence to support at this time that patient is in active labor.  Low suspicion for placental abruption.  Discussed this with patient and her  at length.  Given reassuring clinical picture at this time we will plan to  discharge patient home.  Discussed at length indications to call clinic or present back to triage area including decreased fetal movement, tawana vaginal bleeding or leaking of fluid, new or worsening pain.  Patient expressed understanding of this.  All questions were answered and addressed.    Dispo: Home. Patient to call clinic to see about being seen next week for close follow up, otherwise instructed to keep scheduled PNC visit 5/9/2023.    Patient discussed with Dr. Baltazar Toledo DO, MS  Obstetrics, Gynecology & Women's Health   Resident, PGY-2  04/28/2023 1:39 AM

## 2023-04-29 LAB — GP B STREP DNA SPEC QL NAA+PROBE: NEGATIVE

## 2023-05-09 ENCOUNTER — OFFICE VISIT (OUTPATIENT)
Dept: MATERNAL FETAL MEDICINE | Facility: CLINIC | Age: 38
End: 2023-05-09
Attending: STUDENT IN AN ORGANIZED HEALTH CARE EDUCATION/TRAINING PROGRAM
Payer: COMMERCIAL

## 2023-05-09 ENCOUNTER — HOSPITAL ENCOUNTER (OUTPATIENT)
Dept: ULTRASOUND IMAGING | Facility: CLINIC | Age: 38
Discharge: HOME | End: 2023-05-09
Attending: STUDENT IN AN ORGANIZED HEALTH CARE EDUCATION/TRAINING PROGRAM
Payer: COMMERCIAL

## 2023-05-09 ENCOUNTER — TELEPHONE (OUTPATIENT)
Dept: NURSING | Facility: CLINIC | Age: 38
End: 2023-05-09

## 2023-05-09 ENCOUNTER — PRENATAL OFFICE VISIT (OUTPATIENT)
Dept: OBGYN | Facility: CLINIC | Age: 38
End: 2023-05-09
Payer: COMMERCIAL

## 2023-05-09 VITALS
WEIGHT: 152.3 LBS | DIASTOLIC BLOOD PRESSURE: 70 MMHG | HEART RATE: 79 BPM | BODY MASS INDEX: 24.21 KG/M2 | SYSTOLIC BLOOD PRESSURE: 106 MMHG

## 2023-05-09 DIAGNOSIS — O43.122 VELAMENTOUS INSERTION OF UMBILICAL CORD IN SECOND TRIMESTER: ICD-10-CM

## 2023-05-09 DIAGNOSIS — Z87.59 HISTORY OF PLACENTAL ABRUPTION: ICD-10-CM

## 2023-05-09 DIAGNOSIS — Z87.59 HISTORY OF PLACENTAL ABRUPTION: Primary | ICD-10-CM

## 2023-05-09 DIAGNOSIS — O09.90 HIGH-RISK PREGNANCY, UNSPECIFIED TRIMESTER: Primary | ICD-10-CM

## 2023-05-09 DIAGNOSIS — O09.522 MULTIGRAVIDA OF ADVANCED MATERNAL AGE IN SECOND TRIMESTER: ICD-10-CM

## 2023-05-09 LAB
GLUCOSE 1H P 50 G GLC PO SERPL-MCNC: 143 MG/DL (ref 70–129)
HGB BLD-MCNC: 11.8 G/DL (ref 11.7–15.7)
HOLD SPECIMEN: NORMAL

## 2023-05-09 PROCEDURE — 36415 COLL VENOUS BLD VENIPUNCTURE: CPT | Performed by: OBSTETRICS & GYNECOLOGY

## 2023-05-09 PROCEDURE — 99207 PR PRENATAL VISIT: CPT | Performed by: OBSTETRICS & GYNECOLOGY

## 2023-05-09 PROCEDURE — 82950 GLUCOSE TEST: CPT | Performed by: OBSTETRICS & GYNECOLOGY

## 2023-05-09 PROCEDURE — 76816 OB US FOLLOW-UP PER FETUS: CPT

## 2023-05-09 PROCEDURE — 76816 OB US FOLLOW-UP PER FETUS: CPT | Mod: 26 | Performed by: STUDENT IN AN ORGANIZED HEALTH CARE EDUCATION/TRAINING PROGRAM

## 2023-05-09 ASSESSMENT — PATIENT HEALTH QUESTIONNAIRE - PHQ9
5. POOR APPETITE OR OVEREATING: SEVERAL DAYS
SUM OF ALL RESPONSES TO PHQ QUESTIONS 1-9: 1

## 2023-05-09 ASSESSMENT — ANXIETY QUESTIONNAIRES
IF YOU CHECKED OFF ANY PROBLEMS ON THIS QUESTIONNAIRE, HOW DIFFICULT HAVE THESE PROBLEMS MADE IT FOR YOU TO DO YOUR WORK, TAKE CARE OF THINGS AT HOME, OR GET ALONG WITH OTHER PEOPLE: SOMEWHAT DIFFICULT
GAD7 TOTAL SCORE: 4
1. FEELING NERVOUS, ANXIOUS, OR ON EDGE: SEVERAL DAYS
5. BEING SO RESTLESS THAT IT IS HARD TO SIT STILL: NOT AT ALL
2. NOT BEING ABLE TO STOP OR CONTROL WORRYING: SEVERAL DAYS
GAD7 TOTAL SCORE: 4
3. WORRYING TOO MUCH ABOUT DIFFERENT THINGS: NOT AT ALL
6. BECOMING EASILY ANNOYED OR IRRITABLE: NOT AT ALL
7. FEELING AFRAID AS IF SOMETHING AWFUL MIGHT HAPPEN: SEVERAL DAYS

## 2023-05-09 NOTE — RESULT ENCOUNTER NOTE
Please place the order for the 3 hour glucose tolerance test and contact the patient to inform her of the elevated 1 hr and review the instructions for the 3 hours test and help schedule. Her hemoglobin looks great! Thank you!

## 2023-05-09 NOTE — PROGRESS NOTES
Return OB visit    Subjective:  Patient reports active fetal movement, no vaginal bleeding or leaking fluid. She is starting to have more mathew che contractions which is worrisome to her given her hx of a placental abruption. Also having some increased anxiety as she approaches this point in pregnancy but is seeing a therapist and has good support.         Objective:  /70   Pulse 79   Wt 69.1 kg (152 lb 4.8 oz)   LMP 2022   BMI 24.21 kg/m     See OB flow sheet    Component      Latest Ref Rng 2023  11:31 AM   Glu Gest Screen 1hr 50g      70 - 129 mg/dL 143 (H)    Hemoglobin      11.7 - 15.7 g/dL 11.8       Legend:  (H) High      Assessment and Plan    Muna Tapia is a 38 year old  at 24w5d here for SUMIT visit, pregnancy complicated by hx of abruption at 32 weeks, AMA, and velamentous cord insertion     This visit:  -Reviewed elevated 1 hr GCT and plan for 3 hour test, hemoglobin normal  -Discussed that it is common to have increased anxiety as she approaches the third trimester and reassurance provided regarded BH contractions. Also discussed that we are happy to see her more frequently during the pregnancy for reassurance but she feels like she is managing ok for now and then will start weekly BPPs at 28 weeks.   -Discussed Tdap at next visit, patient uncertain and handout given     Next visit:  -Discuss Tdap     RTC in 4 weeks or sooner PRN, will do 3 hr GTT in the next 1-2 weeks     Shameka Harrington MD

## 2023-05-15 ENCOUNTER — LAB (OUTPATIENT)
Dept: LAB | Facility: CLINIC | Age: 38
End: 2023-05-15
Payer: COMMERCIAL

## 2023-05-15 DIAGNOSIS — O09.90 HIGH-RISK PREGNANCY, UNSPECIFIED TRIMESTER: ICD-10-CM

## 2023-05-15 LAB
GESTATIONAL GTT 1 HR POST DOSE: 159 MG/DL (ref 60–179)
GESTATIONAL GTT 2 HR POST DOSE: 174 MG/DL (ref 60–154)
GESTATIONAL GTT 3 HR POST DOSE: 90 MG/DL (ref 60–139)
GLUCOSE P FAST SERPL-MCNC: 80 MG/DL (ref 60–94)

## 2023-05-15 PROCEDURE — 82952 GTT-ADDED SAMPLES: CPT

## 2023-05-15 PROCEDURE — 82951 GLUCOSE TOLERANCE TEST (GTT): CPT

## 2023-05-15 PROCEDURE — 36415 COLL VENOUS BLD VENIPUNCTURE: CPT

## 2023-06-12 ENCOUNTER — HOSPITAL ENCOUNTER (OUTPATIENT)
Dept: ULTRASOUND IMAGING | Facility: CLINIC | Age: 38
Discharge: HOME | End: 2023-06-12
Attending: STUDENT IN AN ORGANIZED HEALTH CARE EDUCATION/TRAINING PROGRAM
Payer: COMMERCIAL

## 2023-06-12 ENCOUNTER — OFFICE VISIT (OUTPATIENT)
Dept: MATERNAL FETAL MEDICINE | Facility: CLINIC | Age: 38
End: 2023-06-12
Attending: STUDENT IN AN ORGANIZED HEALTH CARE EDUCATION/TRAINING PROGRAM
Payer: COMMERCIAL

## 2023-06-12 DIAGNOSIS — Z87.59 HISTORY OF PLACENTAL ABRUPTION: Primary | ICD-10-CM

## 2023-06-12 DIAGNOSIS — O43.122 VELAMENTOUS INSERTION OF UMBILICAL CORD IN SECOND TRIMESTER: ICD-10-CM

## 2023-06-12 DIAGNOSIS — Z87.59 HISTORY OF PLACENTAL ABRUPTION: ICD-10-CM

## 2023-06-12 PROCEDURE — 76816 OB US FOLLOW-UP PER FETUS: CPT

## 2023-06-12 PROCEDURE — 76819 FETAL BIOPHYS PROFIL W/O NST: CPT | Mod: 26 | Performed by: OBSTETRICS & GYNECOLOGY

## 2023-06-12 PROCEDURE — 76819 FETAL BIOPHYS PROFIL W/O NST: CPT

## 2023-06-12 PROCEDURE — 76816 OB US FOLLOW-UP PER FETUS: CPT | Mod: 26 | Performed by: OBSTETRICS & GYNECOLOGY

## 2023-06-12 NOTE — PROGRESS NOTES
"Please see \"Imaging\" tab under \"Chart Review\" for details of today's ultrasound.    Armaan Michele M.D.  Specialist in Maternal-Fetal Medicine     "

## 2023-06-13 ENCOUNTER — PRENATAL OFFICE VISIT (OUTPATIENT)
Dept: OBGYN | Facility: CLINIC | Age: 38
End: 2023-06-13
Payer: COMMERCIAL

## 2023-06-13 VITALS
BODY MASS INDEX: 25.17 KG/M2 | DIASTOLIC BLOOD PRESSURE: 76 MMHG | SYSTOLIC BLOOD PRESSURE: 119 MMHG | HEART RATE: 81 BPM | OXYGEN SATURATION: 99 % | WEIGHT: 158.3 LBS

## 2023-06-13 DIAGNOSIS — O34.219 PREVIOUS CESAREAN DELIVERY, ANTEPARTUM CONDITION OR COMPLICATION: Primary | ICD-10-CM

## 2023-06-13 DIAGNOSIS — O09.90 HIGH-RISK PREGNANCY, UNSPECIFIED TRIMESTER: ICD-10-CM

## 2023-06-13 PROCEDURE — 99207 PR PRENATAL VISIT: CPT | Performed by: OBSTETRICS & GYNECOLOGY

## 2023-06-13 NOTE — PROGRESS NOTES
29w5d overall doing well and feeling good physically.  Some increased anxiety, but overall it feels manageable.  Had growth us and BPP yesterday, at 20%skyla, discussed.  Will schedule c/s, discussed BS at same time and she is unsure, will d/w her  as they don't want more children.  She declines tdap.  RTC 2 weeks  jlp

## 2023-06-13 NOTE — PATIENT INSTRUCTIONS
Pregnancy: Your Second Trimester Changes  Each day, you and your baby are changing and growing together. Here s a quick look at what s happening to both of you.  How you are changing  Even when you don t notice it, your body is adapting to meet the needs of your growing baby. The changes in your body might also affect your moods.  Your body  Your uterus expands as your baby grows. As the weeks go by, you will feel more pressure on your bladder, stomach, and other organs. You may notice some skin color changes on your forehead, nose, or cheeks. Freckles may darken, and moles may grow. You may notice a darker line on your abdomen between your belly button and pubic bone in the midline.  Your moods  The second trimester is often easier than the first. Still, be prepared for mood swings. These are from the increase in hormones made by your body. Hormones are chemicals that affect the way organs work. These mood swings are a normal part of pregnancy.  How your baby is growing    Month 4  Your baby s heartbeat may be heard with a Doppler (handheld ultrasound device) by 9 to 10 weeks. Eyebrows, eyelashes, and fingernails begin to form.    Month 5  You may feel your baby move. After a growth spurt, your baby nears 10 inches.    Month 6  Your baby s fingerprints have formed. Your baby weighs about 1 to 2 pounds and is about 12 inches long.    [employer] last reviewed this educational content on 7/1/2021 2000-2022 [employer]. All rights reserved. This information is not intended as a substitute for professional medical care. Always follow your healthcare professional's instructions.          Adapting to Pregnancy: Second Trimester  Keep up the healthy habits you started in your first trimester. You might be a little more tired than normal. So plan your day wisely. Look at the tips below and choose the ones that suit your lifestyle.   Note  If you have any questions, talk with your healthcare provider.   If  you work  If you can, adjust your work with your employer to fit your needs. Try these tips:     If you stand for long periods, find ways to do some tasks while sitting. Also, try to stand with 1 foot resting on a low stool or ledge. Shift your weight from foot to foot often. Wear low-heeled shoes.    If you sit, keep your knees level with your hips. Rest your feet on a firm surface. Sit tall with support for your low back.    If you work long hours, ask about adjusting your schedule. Try taking shorter breaks more often.  When you travel  The second trimester may be the best time for any travel. Talk to your healthcare provider about any special plans you may need to make. Always:     Wear a seat belt. Fasten the lap part under your belly. Wear the shoulder part also.    Take breaks often during long trips by car or plane. Move around to stretch your legs.    Drink plenty of fluids on flights. The air in plane cabins is very dry.    Stay out of hot climates or high altitudes if you are not used to them.    Stay away from places where the food and water might make you sick.    Make sure you are up-to-date on all vaccines, including the flu vaccine. This is especially important when traveling overseas.  Taking time to relax  Find time to rest and relax at work or at home:     Take short time-outs daily. Do relaxation exercises.    Breathe deeply during stressful times.    Try not to take on too much. Plan tasks for times when you have the most energy.    Take naps when you can. Or just sit and relax.    After week 16, don't lie on your back for more than a few minutes. Instead, lie on your side. Switch sides often.    Having sex  Unless your healthcare provider tells you otherwise, there is no reason to stop having sex now. Blood supply increases to the pelvic area in the second trimester. Because of this, sex might be more enjoyable. Try different positions and see what s best. Also talk with your partner about any  changes in desire. Spotting may happen after sex. Let your healthcare provider know if there is heavy bleeding.   Keeping your environment safe  You can still clean your house and use scented products. Just take some simple precautions:     Wear gloves when using cleaning fluids.    Open windows to let in fresh air. Use a fan if you paint.    Stay away from secondhand smoke.    Don t breathe fumes from nail polish, hair spray, cleansers, or other chemicals.  How daily issues affect your health  Many things in your daily life impact your health. This can include transportation, money problems, housing, access to food, and . If you can t get to medical appointments, you may not receive the care you need. When money is tight, it may be difficult to pay for medicines. And living far from a grocery store can make it hard to buy healthy food.   If you have concerns in any of these or other areas, talk with your healthcare team. They may know of local resources to assist you. Or they may have a staff person who can help.   [employer] last reviewed this educational content on 6/1/2021 2000-2022 [employer]. All rights reserved. This information is not intended as a substitute for professional medical care. Always follow your healthcare professional's instructions.          Understanding Round Ligament Pain in Pregnancy   Round ligament pain is a common problem in pregnancy. Ligaments are strong tissues that connect bones, muscles, and organs. There are 2 round ligaments. There is 1 on each side of the uterus. The top part of each ligament attaches to the upper side of the uterus. The bottom of each ligament attaches down in the pubic area. These ligaments help keep the uterus in place as you move around.     What causes round ligament pain in pregnancy?   As your uterus grows during pregnancy, the round ligaments are stretched and work harder when you move around. They may stretch too quickly when  you stand up or bend or laugh. Nearby nerves may be irritated, or the ligaments may have a painful spasm.   Symptoms of ligament pain in pregnancy  The symptoms are sharp pains that last a few seconds. The pain may happen most often on the right side of the belly. It may happen in the hip, the lower belly, or even deep down in your pubic area. The pain may happen when you:     Move suddenly    Stand up    Walk    Roll over in bed    Laugh    Cough    Sneeze  Diagnosing round ligament pain in pregnancy   Your healthcare provider will ask about your symptoms and give you a physical exam. He or she may give you tests to check for other problems that can cause pain, such as an ovarian cyst or enlarged vein (varicocele). He or she will also check for signs of  labor or other pregnancy problems.   Treatment for round ligament pain in pregnancy   To help prevent pain:    Move slowly when you stand up, roll over, turn, or bend.    Don t stand for long periods of time.    Don t lift heavy objects.    Do gentle daily stretches of your hip joints.  When to call your healthcare provider  Call your healthcare provider right away if you have any of these:     Fever of 100.4 F (38 C) or higher    Pains that last more than a few minutes    Pain that gets worse    Bleeding, nausea, vomiting, or other new symptoms  Pull last reviewed this educational content on 3/1/2020    9659-4402 [employer]. All rights reserved. This information is not intended as a substitute for professional medical care. Always follow your healthcare professional's instructions.          Relieving Back Pain During Pregnancy: Wall Stretch, Body Bend   Before trying these exercises, talk to your healthcare provider to make sure they are safe for you. Ask your healthcare provider how many times to do each exercise.   Wall stretch  This strengthens and loosens the muscles in your upper back:   1. Lean against a wall with a firm pillow or  rolled towel under your shoulder blades. Your feet should be about 12 inches from the wall and shoulder-width apart. Point your chin down.  2. Breathe in. Push your shoulders, neck, and head against the wall. You will feel a stretch in your shoulders.  3. Hold for 5 counts. Then breathe out, and relax your shoulders and neck.   Body bend  This strengthens your back and buttocks muscles:   1. Stand with your legs shoulder-width apart. Put your hands on your upper thighs and bend your knees slightly.  2. Slowly bend forward at the hips. Push your hips back and keep your shoulders up. Make sure your back is straight. You ll feel a stretch in your upper thighs. You ll also feel your back muscles holding you in position.  3. Hold for 5 counts, then straighten.   [employer] last reviewed this educational content on 7/1/2021 2000-2022 [employer]. All rights reserved. This information is not intended as a substitute for professional medical care. Always follow your healthcare professional's instructions.          Pregnancy: Planning Your Exercise Routine  While you re pregnant, an exercise routine helps both your mind and your body feel good. It tones your muscles and makes them stronger. It also gives you and your baby more oxygen.   The right exercise for you    Overall conditioning is best for you and your baby. Try walking, swimming, or riding a stationary bike. Always warm up, cool down, and drink enough fluids. Keep a snack close by in case your blood sugar gets low. Discuss exercise choices with your healthcare provider. Talk about the following:     If you already exercise, find out how to adapt your routine while you re pregnant. Keep the intensity of the exercise moderate. As your pregnancy progresses, your center of gravity will change. Be careful to keep your balance.    Ask if there are any local prenatal exercise classes, such as yoga or water aerobics. Find out which prenatal exercise videos  are good choices.    If you were not exercising before your pregnancy, find out the best way to start. Now is not the time to begin a new workout on your own. Start slowly. Listen to your body.    Ask which forms of exercise you should avoid. These may include risky activities like hot yoga, horseback riding, scuba diving, skiing, skating, and contact sports.  Pelvic tilts  These help strengthen your stomach muscles and low back. You can do pelvic tilts instead of sit-ups.     Do this exercise on your hands and knees.    Relax the back of your neck. Pull your stomach in until your low back flattens.    Hold for 30 seconds. Release. Repeat 10 times. Do this twice a day.  Kegel exercises  Kegel exercises strengthen the pelvic muscles. Doing Kegels daily helps prepare these muscles for delivery. Kegels also help ease your recovery. You exercise these muscles by tightening, holding, then relaxing them. To do 1 type of Kegel exercise, contract as if you were stopping your urine stream (but do it when you re not urinating). Hold for 10 seconds, then repeat 10 times, a few times a day.   Tips to add activity  Here are some tips to follow:    Park the car farther from a store and walk.    If you can, do errands on foot instead of driving.    Walk across the office to talk to someone in person instead of calling.    While waiting for appointments, go up and down stairs or around the block.  Tips to stay active  Here are some tips to follow:    Maintain your routine. But exercise less intensely if you feel tired.    Base your workout on how you feel, not your heart rate. Heart rates aren t a good way to measure effort during pregnancy.    Don't exercise on your back after week 16.  What are the warning signs that I should stop exercising?  Stop exercising and call your healthcare provider if you have any of these symptoms:    Vaginal bleeding     Dizziness or feeling faint     Increased shortness of breath     Chest  pain     Headache     Muscle weakness     Calf (back of the leg) pain or swelling      Uterine contractions or  labor     Decreased fetal movement     Fluid leaking (or gushing) from your vagina  [employer] last reviewed this educational content on 2021-2022 [employer]. All rights reserved. This information is not intended as a substitute for professional medical care. Always follow your healthcare professional's instructions.          Blood Glucose Screening During Pregnancy  Gestational diabetes is diabetes that only pregnant women get. Changes in your body during pregnancy can cause high blood sugar (glucose). This can cause problems for you and your baby. It is a serious condition. But it can be controlled.     Your healthcare provider will talk with you about blood glucose screening.     Who is at risk for gestational diabetes?  You are at risk of getting gestational diabetes if any of the risk factors below apply to you. The risk for this condition gets higher as your number of risk factors increases:    You are , , , , or .    You weigh more than your healthcare provider says is healthy for you.    You have a relative with diabetes.    You are older than 25.    You had gestational diabetes during a past pregnancy.    You had a stillbirth or a very large baby before.    You have a history of abnormal glucose tolerance.    You have sugar in your urine at the first prenatal visit.    You have metabolic syndrome, PCOS (polycystic ovary syndrome), are using glucocorticoids, or have high blood pressure.    You are pregnant with twins or more  What happens during a screening?  Here is what to expect during a blood glucose screening:    There is conflicting advice for screening. But the American College of Obstetricians and Gynecologists currently advises that all pregnant women be screened for gestational diabetes. When you  are screened depends on your risk. Women are tested at 24 to 28 weeks of pregnancy. Women at high risk may be tested when they first learn they are pregnant.    To do the screening, a blood sample is taken. Your blood sugar level is measured.    If the results show a high blood sugar level, a glucose tolerance test may be ordered. You will drink a certain amount of sugar. This test measures how long it takes for sugar to leave your blood. The test will show if you have gestational diabetes.  What to know if you test positive  Here are some things you need to know:    Gestational diabetes can be treated. The best way to control it is to find out you have it early and start treatment quickly.    This condition can cause problems for the mother during pregnancy. It can also cause problems with the baby during pregnancy, delivery, and after. Treatment greatly lowers the chance for problems.    The changes in your body that cause gestational diabetes normally happen only when you are pregnant. After the baby is born, your body goes back to normal. The condition goes away. But you may be more likely to have type 2 diabetes later. Talk with your healthcare provider about ways to help prevent type 2 diabetes.  Treating gestational diabetes  Here is how to treat gestational diabetes:    You ll need to check your blood sugar often. You can do this at home. Prick your finger and check a drop of blood on a glucose monitor. Your healthcare provider will show you how and when to check your blood sugar. They will talk about your target blood sugar level.    To manage your blood sugar, you will be given a special plan. It will likely include meal planning and getting regular exercise. Some women need to take a hormone called insulin. Others may take medicine to help control their blood sugar.  National Institutes of Health (NIH) last reviewed this educational content on 8/1/2020 2000-2022 [employer]. All rights reserved. This information is  not intended as a substitute for professional medical care. Always follow your healthcare professional's instructions.          Tdap (Tetanus, Diphtheria, Pertussis) Vaccine: What You Need to Know    This is a Vaccine Information Statement from the CDC.   Many vaccine information statements are available in [place]ian and other languages. See www.immunize.org/vis   Hojas de información sobre vacunas están disponibles en español y en muchos otros idiomas. Visite www.immunize.org/vis   1. Why get vaccinated?   Tdap vaccine can prevent tetanus, diphtheria, and pertussis.   Diphtheria and pertussis spread from person to person. Tetanus enters the body through cuts or wounds.     TETANUS (T) causes painful stiffening of the muscles. Tetanus can lead to serious health problems, including being unable to open the mouth, having trouble swallowing and breathing, or death.    DIPHTHERIA (D) can lead to difficulty breathing, heart failure, paralysis, or death.    PERTUSSIS (aP), also known as  whooping cough,  can cause uncontrollable, violent coughing that makes it hard to breathe, eat, or drink. Pertussis can be extremely serious especially in babies and young children, causing pneumonia, convulsions, brain damage, or death. In teens and adults, it can cause weight loss, loss of bladder control, passing out, and rib fractures from severe coughing.  2. Tdap vaccine   Tdap is only for children 7 years and older, adolescents, and adults.   Adolescents should receive a single dose of Tdap, preferably at age 11 or 12 years.   Pregnant people should get a dose of Tdap during every pregnancy, preferably during the early part of the third trimester, to help protect the  from pertussis. Infants are most at risk for severe, life-threatening complications from pertussis.   Adults who have never received Tdap should get a dose of Tdap.   Also, adults should receive a booster dose of either Tdap or Td (a different vaccine that  protects against tetanus and diphtheria but not pertussis) every 10 years , or after 5 years in the case of a severe or dirty wound or burn.   Tdap may be given at the same time as other vaccines.   3. Talk with your health care provider  Tell your vaccination provider if the person getting the vaccine:     Has had an allergic reaction after a previous dose of any vaccine that protects against tetanus, diphtheria, or pertussis , or has any severe, life-threatening allergies    Has had a coma, decreased level of consciousness, or prolonged seizures within 7 days after a previous dose of any pertussis vaccine (DTP, DTaP, or Tdap)    Has seizures or another nervous system problem    Has ever had Guillain-Barré Syndrome (also called  GBS )    Has had severe pain or swelling after a previous dose of any vaccine that protects against tetanus or diphtheria  In some cases, your health care provider may decide to postpone Tdap vaccination until a future visit.   People with minor illnesses, such as a cold, may be vaccinated. People who are moderately or severely ill should usually wait until they recover before getting Tdap vaccine.   Your health care provider can give you more information.  4. Risks of a vaccine reaction     Pain, redness, or swelling where the shot was given, mild fever, headache, feeling tired, and nausea, vomiting, diarrhea, or stomachache sometimes happen after Tdap vaccination.  People sometimes faint after medical procedures, including vaccination. Tell your provider if you feel dizzy or have vision changes or ringing in the ears.   As with any medicine, there is a very remote chance of a vaccine causing a severe allergic reaction, other serious injury, or death.   5. What if there is a serious problem?  An allergic reaction could occur after the vaccinated person leaves the clinic. If you see signs of a severe allergic reaction (hives, swelling of the face and throat, difficulty breathing, a fast  heartbeat, dizziness, or weakness), call 9-1-1 and get the person to the nearest hospital.   For other signs that concern you, call your health care provider.   Adverse reactions should be reported to the Vaccine Adverse Event Reporting System (VAERS). Your health care provider will usually file this report, or you can do it yourself. Visit the VAERS website at www.vaers.[facility].gov or call 1-647.748.8157. VAERS is only for reporting reactions, and VAERS staff members do not give medical advice.   6. The National Vaccine Injury Compensation Program  The National Vaccine Injury Compensation Program (VICP) is a federal program that was created to compensate people who may have been injured by certain vaccines. Claims regarding alleged injury or death due to vaccination have a time limit for filing, which may be as short as two years. Visit the VICP website at www.hrsa.gov/vaccinecompensation or call 1-345.512.3064 to learn about the program and about filing a claim.   7. How can I learn more?     Ask your health care provider.    Call your local or state health department.    Visit the website of the Food and Drug Administration (FDA) for vaccine package inserts and additional information at www.fda.gov/vaccines-blood-biologics/vaccines.  ? Contact the Centers for Disease Control and Prevention (CDC): Call 1-698.421.8065 ( 4-061-DHU-INFO) or  ? Visit CDC s website at www.cdc.gov/vaccines.  Vaccine Information Statement   Tdap (Tetanus, Diphtheria, Pertussis) Vaccine  42 U.S.C.   [id number]  8/6/2021  [employer] last reviewed this educational content on     7514-5475 [employer]. All rights reserved. This information is not intended as a substitute for professional medical care. Always follow your healthcare professional's instructions.        You have been provided the My Labor and Birth Wishes document.  Please review at home and bring to your next prenatal visit. Bring this sheet to the hospital for your  birth. Give copies to your care team members and support person.   Additional copies can be found here:  [email or web address]    Kick Counts  It s normal to worry about your baby s health. One way you can know your baby s doing well is to record the baby s movements once a day. This is called a kick count.   Remember to take your kick count records to all your appointments with your healthcare provider.  How to count kicks    Time how long it takes you to feel 10 kicks, flutters, swishes, or rolls. Ideally, you want to feel at least 10 movements in 2 hours. You will likely feel 10 movements in less time than that.  Starting at 28 weeks, count your baby's movements daily. Follow your healthcare provider's instructions for kick counting. Here are tips for counting kicks:    Choose a time when the baby is active, such as after a meal.     Sit comfortably or lie on your side.     The first time the baby moves, write down the time.     Count each movement until the baby has moved  10 times. This can take from 20 minutes to 2 hours.     If you haven't felt 10 kicks by the end of the second hour, wait a few hours. Then try again.    Try to do it at the same time each day.  When to call your healthcare provider  Call your healthcare provider  right away if:    You do a couple sets of kick counts during the day and your baby moves fewer than 10 times in 2 hours.    Your baby moves much less often than on the days before.    You haven't felt your baby move all day.  [employer] last reviewed this educational content on 8/1/2020 2000-2022 [employer]. All rights reserved. This information is not intended as a substitute for professional medical care. Always follow your healthcare professional's instructions.        Counting Your Baby's Movements   Counting your unborn baby's movements will assure you of your baby's health.   The best time to count is when your baby is most active. Do it at the same time  every day.  To keep track of your baby's movements, use the attached chart. Bring the chart with you to each clinic visit.  1. Do not smoke for at least 2 hours before counting your baby's movements. (In fact, it's best to quit smoking if you're pregnant.)  2. Lie on your side. Put your hand on your baby.  3. Write the time you start counting movements.  4. Count the next 10 kicks, rolls, and other movements.  5. Write the time when your baby has finished 10 movements.  6. If you reach 10 movements within 2 hours, you're done for the day.  Call your care provider right away if:    You feel no movement for the first hour.    It takes more than 2 hours to feel 10 movements.    There's a change in the normal pattern of movements.  Your care provider's phone number is:   ________________________________________  The number to your [facility] center is:   ________________________________________     For informational purposes only. Not to replace the advice of your health care provider. Copyright   ,  [facility]. All rights reserved. Clinically reviewed by Era Bañuelos RN, [facility]. [employer] [id number] - REV 10/21.       Understanding  Labor  Going into labor before week 37 of pregnancy is called  labor.  labor can cause your baby to be born too soon. This can lead to health problems for your baby.     Before labor, the cervix is thick and closed.      In  labor, the cervix begins to efface (thin) and dilate (open).     Symptoms of  labor  If you think you re having  labor, get medical help right away. Contractions alone don t mean you re in  labor. What matters more are changes in your cervix. The cervix is the opening at the lower end of the uterus. Symptoms of  labor include:    4 or more contractions per hour    Strong contractions    Constant menstrual-like cramping    Low-back pain    Mucous or bloody fluid from  the vagina    Bleeding or spotting in the second or third trimester  Evaluating  labor  Your healthcare provider will try to find out if you re in  labor or just having contractions. They may watch you for a few hours. You may have these tests:    Pelvic exam. This is to see if your cervix has effaced (thinned) and dilated (opened).    Uterine activity monitoring. This is used to detect contractions.    Fetal monitoring. This is done to check the health of your baby.    Ultrasound. This test looks at your baby s size and position.    Amniocentesis. This test checks how mature your baby s lungs are.  Caring for yourself at home  If you have  contractions, but your cervix is still thick and closed, your healthcare provider may tell you to:    Drink plenty of water.    Do fewer activities.    Rest in bed on your side.    Don't have intercourse or stimulate your nipples.  When to call your healthcare provider  Call your healthcare provider if you have any of these:    4 or more contractions per hour    Bag of water breaks    Bleeding or spotting  If you need hospital care   labor often means that you need hospital care. You may need complete bed rest. You may have an IV (intravenous) line in your arm or hand. This is to give you fluids. You may be given pills or injections. These are done to help prevent contractions. You may get a medicine called a corticosteroid. This is to help your baby s lungs mature more quickly.  Are you at risk?  Any pregnant woman can have  labor. It may start for no reason. But these risk factors can increase your chances:    Past  labor or early birth    Smoking, drug, or alcohol use in pregnancy    A multiple pregnancy (twins or more)    Problems with the shape of the uterus    Bleeding during the pregnancy  The dangers of  birth  A baby born too soon may have health problems. This is because the baby didn t have enough time to grow. Some of  "the risks for your baby include:    Not breastfeeding or feeding well    Having immature lungs    Bleeding in the brain    Death  Reaching term  Your goal is to get as close to term (week 37 or later) as you can before giving birth. The closer you get to term, the higher your chance of having a healthy baby. Work with your healthcare provider. Together, you can take steps that may keep you from giving birth too early.  [employer] last reviewed this educational content on 10/1/2021    9961-0204 [employer]. All rights reserved. This information is not intended as a substitute for professional medical care. Always follow your healthcare professional's instructions.          Using Nitrous Oxide During Labor  What is nitrous oxide?  Nitrous oxide is a mixture of 50% nitrous oxide gas and 50% oxygen that is inhaled through a mask. Nitrous oxide is better known for use in dental offices or before surgery to help patients relax. Most people know of it as \"laughing gas.\"   How do I use it during labor?  You hold your own mask and begin to inhale the gas mixture about 30 seconds before a contraction begins. Breathing before and during a contraction helps the gas work the best right at the peak of the contraction, providing the greatest relief. It may take 3 to 4 contractions to get used to the rhythm of breathing the nitrous oxide.   Does nitrous oxide have any side effects?  Some women have reported dizziness, feeling sleepy, dry mouth and nausea (feeling sick to your stomach) with use of nitrous oxide. These side effects often decrease over time. Medicine is available to help ease nausea if it is a concern for you.   Is any extra monitoring required for me or my baby?  No. Your monitoring will not need to change just because you are using nitrous oxide.   Can I still be out of bed and use nitrous oxide?  Yes. Women sometimes feel dizzy when using nitrous oxide. For this reason, you will begin using nitrous " oxide while in a bed or chair. If you feel okay, you may get up and walk or use a birthing ball or stool. It will be important that you have someone in the room close by for support.   Can I use nitrous oxide and have IV pain medicines at the same time?  No. The combination of narcotics (injectable pain medications) and nitrous oxide can slow your breathing, so it is not safe for them to be used together. You may use nitrous oxide before receiving an epidural or IV pain medication.  If I use nitrous oxide can I still get an epidural?  Yes. You may wish to use nitrous oxide until you are ready for an epidural. Nitrous oxide can be less effective than an epidural in reducing labor pain. If an epidural is part of your birth plan, it is important that you get your epidural while you are still able to sit for safe placement.   Are there reasons I couldn't use nitrous oxide?   Yes. You cannot use nitrous oxide if you:    Cannot hold your own face mask.    Have received a dose of narcotic in the past few hours (your nurse will discuss this with you).    Have a documented B12 deficiency.    Have one of a very few other rare medical conditions.  Can my labor support person help me with my nitrous oxide?  No! Only you can administer nitrous oxide to yourself. Part of the built-in safety of nitrous oxide is that you hold your own mask. If anyone in the room is found to be handling the nitrous oxide equipment other than you or your nurse, the nitrous oxide will be removed.  Can I use nitrous oxide with other procedures?   Yes. If nitrous oxide is right for you and your provider agrees, it may be used in these situations:    During the repair of a laceration or episiotomy    Manual removal of your placenta    Blood draws    IV placement  For informational purposes only. Not to replace the advice of your health care provider. Copyright   2019 [facility]. All rights reserved. Clinically reviewed by  [facility]. [employer] 113347 - Rev 01/20.          Pregnancy: Your Third Trimester Changes  As the baby grows, your body changes, too. You may also see signs that your body is getting ready for labor. Be patient. Within a few more weeks, your baby will be born.   How you are changing  Your body is preparing for the birth of your baby. Some of the most common changes are listed below. If you have any questions or concerns, ask your healthcare provider:     You ll gain more weight from fluids, extra blood, and fat deposits.    Your breasts will grow as your body gets ready to feed the baby. They may be more tender. You may also notice a slight yellow or white discharge from the nipples.    Discharge from your vagina may increase. This is normal.    You might see some skin color changes on your forehead, cheeks, or nose. Most of these will go away after you deliver.  How your baby is growing  Month 7  Your baby can open and close their eyes and weighs around 4 pounds (1.8 kg). If born prematurely (too early), your baby would likely survive with special care.     Month 8  Your baby is building up body fat and weighs around 6 pounds (2.7 kg).    Month 9  Your baby weighs nearly 7 pounds (3.2 kg) and is about 19 to 21 inches long. In other words, any day now...     [employer] last reviewed this educational content on 9/1/2021 2000-2022 [employer]. All rights reserved. This information is not intended as a substitute for professional medical care. Always follow your healthcare professional's instructions.

## 2023-06-14 ENCOUNTER — TELEPHONE (OUTPATIENT)
Dept: OBGYN | Facility: CLINIC | Age: 38
End: 2023-06-14
Payer: COMMERCIAL

## 2023-06-14 ENCOUNTER — HOSPITAL ENCOUNTER (INPATIENT)
Facility: CLINIC | Age: 38
Setting detail: SURGERY ADMIT
End: 2023-06-14
Attending: OBSTETRICS & GYNECOLOGY | Admitting: OBSTETRICS & GYNECOLOGY
Payer: COMMERCIAL

## 2023-06-14 NOTE — TELEPHONE ENCOUNTER
Type of surgery: ob  Location of surgery: [facility] OR  Date and time of surgery: 8/17/2023 8:30AM  Surgeon: Dr. Neida Ledesma  Pre-Op Appt Date: surgeon  Post-Op Appt Date: 9/28/2023   Packet sent out: Yes  Pre-cert/Authorization completed:  Not Applicable  Date: 6/14/2023

## 2023-06-20 ENCOUNTER — OFFICE VISIT (OUTPATIENT)
Dept: MATERNAL FETAL MEDICINE | Facility: CLINIC | Age: 38
End: 2023-06-20
Attending: STUDENT IN AN ORGANIZED HEALTH CARE EDUCATION/TRAINING PROGRAM
Payer: COMMERCIAL

## 2023-06-20 ENCOUNTER — HOSPITAL ENCOUNTER (OUTPATIENT)
Dept: ULTRASOUND IMAGING | Facility: CLINIC | Age: 38
Discharge: HOME | End: 2023-06-20
Attending: STUDENT IN AN ORGANIZED HEALTH CARE EDUCATION/TRAINING PROGRAM
Payer: COMMERCIAL

## 2023-06-20 DIAGNOSIS — O43.122 VELAMENTOUS INSERTION OF UMBILICAL CORD IN SECOND TRIMESTER: ICD-10-CM

## 2023-06-20 DIAGNOSIS — Z87.59 HISTORY OF PLACENTAL ABRUPTION: Primary | ICD-10-CM

## 2023-06-20 DIAGNOSIS — Z87.59 HISTORY OF PLACENTAL ABRUPTION: ICD-10-CM

## 2023-06-20 PROCEDURE — 76819 FETAL BIOPHYS PROFIL W/O NST: CPT

## 2023-06-20 PROCEDURE — 76819 FETAL BIOPHYS PROFIL W/O NST: CPT | Mod: 26 | Performed by: STUDENT IN AN ORGANIZED HEALTH CARE EDUCATION/TRAINING PROGRAM

## 2023-06-27 ENCOUNTER — PRENATAL OFFICE VISIT (OUTPATIENT)
Dept: OBGYN | Facility: CLINIC | Age: 38
End: 2023-06-27
Payer: COMMERCIAL

## 2023-06-27 ENCOUNTER — HOSPITAL ENCOUNTER (OUTPATIENT)
Dept: ULTRASOUND IMAGING | Facility: CLINIC | Age: 38
Discharge: HOME | End: 2023-06-27
Attending: STUDENT IN AN ORGANIZED HEALTH CARE EDUCATION/TRAINING PROGRAM
Payer: COMMERCIAL

## 2023-06-27 ENCOUNTER — OFFICE VISIT (OUTPATIENT)
Dept: MATERNAL FETAL MEDICINE | Facility: CLINIC | Age: 38
End: 2023-06-27
Attending: STUDENT IN AN ORGANIZED HEALTH CARE EDUCATION/TRAINING PROGRAM
Payer: COMMERCIAL

## 2023-06-27 VITALS
SYSTOLIC BLOOD PRESSURE: 126 MMHG | HEART RATE: 81 BPM | WEIGHT: 160.5 LBS | OXYGEN SATURATION: 99 % | BODY MASS INDEX: 25.52 KG/M2 | DIASTOLIC BLOOD PRESSURE: 82 MMHG

## 2023-06-27 DIAGNOSIS — Z87.59 HISTORY OF PLACENTAL ABRUPTION: ICD-10-CM

## 2023-06-27 DIAGNOSIS — O34.219 PREVIOUS CESAREAN DELIVERY, ANTEPARTUM CONDITION OR COMPLICATION: Primary | ICD-10-CM

## 2023-06-27 DIAGNOSIS — Z87.59 HISTORY OF PLACENTAL ABRUPTION: Primary | ICD-10-CM

## 2023-06-27 PROCEDURE — 76819 FETAL BIOPHYS PROFIL W/O NST: CPT

## 2023-06-27 PROCEDURE — 76819 FETAL BIOPHYS PROFIL W/O NST: CPT | Mod: 26 | Performed by: STUDENT IN AN ORGANIZED HEALTH CARE EDUCATION/TRAINING PROGRAM

## 2023-06-27 PROCEDURE — 99207 PR PRENATAL VISIT: CPT | Performed by: OBSTETRICS & GYNECOLOGY

## 2023-06-27 NOTE — PROGRESS NOTES
31w5d overall feeling good, some low abd/pelvic ache now.  No bleeding or ctx/cramping.  Discussed and reassured.  Good fm.  Has BPP today, wondering if they will measure the placenta, discussed.  RTC 2 weeks  jlp

## 2023-07-02 ENCOUNTER — HEALTH MAINTENANCE LETTER (OUTPATIENT)
Age: 38
End: 2023-07-02

## 2023-07-06 ENCOUNTER — PRENATAL OFFICE VISIT (OUTPATIENT)
Dept: OBGYN | Facility: CLINIC | Age: 38
End: 2023-07-06
Payer: COMMERCIAL

## 2023-07-06 ENCOUNTER — HOSPITAL ENCOUNTER (INPATIENT)
Facility: CLINIC | Age: 38
LOS: 6 days | Discharge: HOME | End: 2023-07-12
Attending: OBSTETRICS & GYNECOLOGY | Admitting: OBSTETRICS & GYNECOLOGY
Payer: COMMERCIAL

## 2023-07-06 ENCOUNTER — HOSPITAL ENCOUNTER (OUTPATIENT)
Dept: ULTRASOUND IMAGING | Facility: CLINIC | Age: 38
Discharge: HOME | End: 2023-07-06
Attending: STUDENT IN AN ORGANIZED HEALTH CARE EDUCATION/TRAINING PROGRAM
Payer: COMMERCIAL

## 2023-07-06 ENCOUNTER — OFFICE VISIT (OUTPATIENT)
Dept: MATERNAL FETAL MEDICINE | Facility: CLINIC | Age: 38
End: 2023-07-06
Attending: STUDENT IN AN ORGANIZED HEALTH CARE EDUCATION/TRAINING PROGRAM
Payer: COMMERCIAL

## 2023-07-06 VITALS
OXYGEN SATURATION: 98 % | SYSTOLIC BLOOD PRESSURE: 162 MMHG | HEART RATE: 85 BPM | DIASTOLIC BLOOD PRESSURE: 104 MMHG | WEIGHT: 167.2 LBS | BODY MASS INDEX: 26.58 KG/M2

## 2023-07-06 DIAGNOSIS — Z87.59 HISTORY OF PLACENTAL ABRUPTION: ICD-10-CM

## 2023-07-06 DIAGNOSIS — O43.122 VELAMENTOUS INSERTION OF UMBILICAL CORD IN SECOND TRIMESTER: Primary | ICD-10-CM

## 2023-07-06 DIAGNOSIS — O14.13 PREECLAMPSIA, SEVERE, THIRD TRIMESTER: ICD-10-CM

## 2023-07-06 DIAGNOSIS — Z98.891 S/P CESAREAN SECTION: Primary | ICD-10-CM

## 2023-07-06 DIAGNOSIS — O16.3 HYPERTENSION DURING PREGNANCY IN THIRD TRIMESTER, UNSPECIFIED HYPERTENSION IN PREGNANCY TYPE: Primary | ICD-10-CM

## 2023-07-06 LAB
ABO/RH(D): NORMAL
ALBUMIN MFR UR ELPH: 8 MG/DL
ALBUMIN UR-MCNC: NEGATIVE MG/DL
ALT SERPL W P-5'-P-CCNC: 26 U/L (ref 0–50)
ANTIBODY SCREEN: NEGATIVE
APPEARANCE UR: CLEAR
APTT PPP: 26 SECONDS (ref 22–38)
AST SERPL W P-5'-P-CCNC: 28 U/L (ref 0–45)
BILIRUB UR QL STRIP: NEGATIVE
COLOR UR AUTO: NORMAL
CREAT SERPL-MCNC: 0.56 MG/DL (ref 0.51–0.95)
CREAT UR-MCNC: 13 MG/DL
ERYTHROCYTE [DISTWIDTH] IN BLOOD BY AUTOMATED COUNT: 12.3 % (ref 10–15)
FIBRINOGEN PPP-MCNC: 394 MG/DL (ref 170–490)
GFR SERPL CREATININE-BSD FRML MDRD: >90 ML/MIN/1.73M2
GLUCOSE UR STRIP-MCNC: NEGATIVE MG/DL
HCT VFR BLD AUTO: 39.4 % (ref 35–47)
HGB BLD-MCNC: 13.9 G/DL (ref 11.7–15.7)
HGB UR QL STRIP: NEGATIVE
HOLD SPECIMEN: NORMAL
HOLD SPECIMEN: NORMAL
INR PPP: 0.89 (ref 0.85–1.15)
KETONES UR STRIP-MCNC: NEGATIVE MG/DL
LEUKOCYTE ESTERASE UR QL STRIP: NEGATIVE
MCH RBC QN AUTO: 33.4 PG (ref 26.5–33)
MCHC RBC AUTO-ENTMCNC: 35.3 G/DL (ref 31.5–36.5)
MCV RBC AUTO: 95 FL (ref 78–100)
NITRATE UR QL: NEGATIVE
PH UR STRIP: 6 [PH] (ref 5–7)
PLATELET # BLD AUTO: 207 10E3/UL (ref 150–450)
PROT/CREAT 24H UR: 0.62 MG/MG CR (ref 0–0.2)
RBC # BLD AUTO: 4.16 10E6/UL (ref 3.8–5.2)
RBC URINE: <1 /HPF
SP GR UR STRIP: 1 (ref 1–1.03)
SPECIMEN EXPIRATION DATE: NORMAL
SQUAMOUS EPITHELIAL: <1 /HPF
UROBILINOGEN UR STRIP-MCNC: NORMAL MG/DL
WBC # BLD AUTO: 11.8 10E3/UL (ref 4–11)
WBC URINE: 0 /HPF

## 2023-07-06 PROCEDURE — 82565 ASSAY OF CREATININE: CPT

## 2023-07-06 PROCEDURE — 36415 COLL VENOUS BLD VENIPUNCTURE: CPT

## 2023-07-06 PROCEDURE — 250N000013 HC RX MED GY IP 250 OP 250 PS 637

## 2023-07-06 PROCEDURE — 86850 RBC ANTIBODY SCREEN: CPT

## 2023-07-06 PROCEDURE — 85014 HEMATOCRIT: CPT

## 2023-07-06 PROCEDURE — 84156 ASSAY OF PROTEIN URINE: CPT

## 2023-07-06 PROCEDURE — 258N000003 HC RX IP 258 OP 636

## 2023-07-06 PROCEDURE — 86901 BLOOD TYPING SEROLOGIC RH(D): CPT

## 2023-07-06 PROCEDURE — 76819 FETAL BIOPHYS PROFIL W/O NST: CPT | Mod: 26 | Performed by: OBSTETRICS & GYNECOLOGY

## 2023-07-06 PROCEDURE — 120N000002 HC R&B MED SURG/OB UMMC

## 2023-07-06 PROCEDURE — 81001 URINALYSIS AUTO W/SCOPE: CPT

## 2023-07-06 PROCEDURE — 59025 FETAL NON-STRESS TEST: CPT | Mod: 26 | Performed by: OBSTETRICS & GYNECOLOGY

## 2023-07-06 PROCEDURE — 84450 TRANSFERASE (AST) (SGOT): CPT

## 2023-07-06 PROCEDURE — 84460 ALANINE AMINO (ALT) (SGPT): CPT

## 2023-07-06 PROCEDURE — 99222 1ST HOSP IP/OBS MODERATE 55: CPT | Mod: 25 | Performed by: OBSTETRICS & GYNECOLOGY

## 2023-07-06 PROCEDURE — 85384 FIBRINOGEN ACTIVITY: CPT

## 2023-07-06 PROCEDURE — 85730 THROMBOPLASTIN TIME PARTIAL: CPT

## 2023-07-06 PROCEDURE — 99207 PR PRENATAL VISIT: CPT | Performed by: OBSTETRICS & GYNECOLOGY

## 2023-07-06 PROCEDURE — 76819 FETAL BIOPHYS PROFIL W/O NST: CPT

## 2023-07-06 PROCEDURE — 250N000011 HC RX IP 250 OP 636

## 2023-07-06 PROCEDURE — 85610 PROTHROMBIN TIME: CPT

## 2023-07-06 RX ORDER — PROCHLORPERAZINE MALEATE 10 MG
10 TABLET ORAL EVERY 6 HOURS PRN
Status: DISCONTINUED | OUTPATIENT
Start: 2023-07-06 | End: 2023-07-09

## 2023-07-06 RX ORDER — MAGNESIUM SULFATE HEPTAHYDRATE 40 MG/ML
4 INJECTION, SOLUTION INTRAVENOUS ONCE
Status: COMPLETED | OUTPATIENT
Start: 2023-07-06 | End: 2023-07-06

## 2023-07-06 RX ORDER — METOCLOPRAMIDE HYDROCHLORIDE 5 MG/ML
10 INJECTION INTRAMUSCULAR; INTRAVENOUS EVERY 6 HOURS PRN
Status: DISCONTINUED | OUTPATIENT
Start: 2023-07-06 | End: 2023-07-09

## 2023-07-06 RX ORDER — BETAMETHASONE SODIUM PHOSPHATE AND BETAMETHASONE ACETATE 3; 3 MG/ML; MG/ML
12 INJECTION, SUSPENSION INTRA-ARTICULAR; INTRALESIONAL; INTRAMUSCULAR; SOFT TISSUE EVERY 24 HOURS
Status: COMPLETED | OUTPATIENT
Start: 2023-07-06 | End: 2023-07-07

## 2023-07-06 RX ORDER — MAGNESIUM SULFATE IN WATER 40 MG/ML
2 INJECTION, SOLUTION INTRAVENOUS CONTINUOUS
Status: DISCONTINUED | OUTPATIENT
Start: 2023-07-06 | End: 2023-07-09

## 2023-07-06 RX ORDER — NIFEDIPINE 10 MG/1
10-20 CAPSULE ORAL
Status: DISCONTINUED | OUTPATIENT
Start: 2023-07-06 | End: 2023-07-13 | Stop reason: HOSPADM

## 2023-07-06 RX ORDER — CALCIUM GLUCONATE 94 MG/ML
1 INJECTION, SOLUTION INTRAVENOUS
Status: DISCONTINUED | OUTPATIENT
Start: 2023-07-06 | End: 2023-07-13 | Stop reason: HOSPADM

## 2023-07-06 RX ORDER — NIFEDIPINE 30 MG/1
30 TABLET, EXTENDED RELEASE ORAL DAILY
Status: DISCONTINUED | OUTPATIENT
Start: 2023-07-06 | End: 2023-07-07

## 2023-07-06 RX ORDER — SODIUM CHLORIDE, SODIUM LACTATE, POTASSIUM CHLORIDE, CALCIUM CHLORIDE 600; 310; 30; 20 MG/100ML; MG/100ML; MG/100ML; MG/100ML
10-125 INJECTION, SOLUTION INTRAVENOUS CONTINUOUS
Status: DISCONTINUED | OUTPATIENT
Start: 2023-07-06 | End: 2023-07-13 | Stop reason: HOSPADM

## 2023-07-06 RX ORDER — ONDANSETRON 2 MG/ML
4 INJECTION INTRAMUSCULAR; INTRAVENOUS EVERY 6 HOURS PRN
Status: DISCONTINUED | OUTPATIENT
Start: 2023-07-06 | End: 2023-07-09

## 2023-07-06 RX ORDER — ONDANSETRON 4 MG/1
4 TABLET, ORALLY DISINTEGRATING ORAL EVERY 6 HOURS PRN
Status: DISCONTINUED | OUTPATIENT
Start: 2023-07-06 | End: 2023-07-09

## 2023-07-06 RX ORDER — METOCLOPRAMIDE 10 MG/1
10 TABLET ORAL EVERY 6 HOURS PRN
Status: DISCONTINUED | OUTPATIENT
Start: 2023-07-06 | End: 2023-07-09

## 2023-07-06 RX ORDER — HYDRALAZINE HYDROCHLORIDE 20 MG/ML
10 INJECTION INTRAMUSCULAR; INTRAVENOUS
Status: DISCONTINUED | OUTPATIENT
Start: 2023-07-06 | End: 2023-07-13 | Stop reason: HOSPADM

## 2023-07-06 RX ORDER — MAGNESIUM SULFATE HEPTAHYDRATE 40 MG/ML
2 INJECTION, SOLUTION INTRAVENOUS
Status: COMPLETED | OUTPATIENT
Start: 2023-07-06 | End: 2023-07-06

## 2023-07-06 RX ORDER — LABETALOL HYDROCHLORIDE 5 MG/ML
20-80 INJECTION, SOLUTION INTRAVENOUS EVERY 10 MIN PRN
Status: DISCONTINUED | OUTPATIENT
Start: 2023-07-06 | End: 2023-07-13 | Stop reason: HOSPADM

## 2023-07-06 RX ORDER — PROCHLORPERAZINE 25 MG
25 SUPPOSITORY, RECTAL RECTAL EVERY 12 HOURS PRN
Status: DISCONTINUED | OUTPATIENT
Start: 2023-07-06 | End: 2023-07-09

## 2023-07-06 RX ORDER — MAGNESIUM SULFATE HEPTAHYDRATE 40 MG/ML
4 INJECTION, SOLUTION INTRAVENOUS
Status: DISCONTINUED | OUTPATIENT
Start: 2023-07-06 | End: 2023-07-13 | Stop reason: HOSPADM

## 2023-07-06 RX ORDER — PRENATAL VIT/IRON FUM/FOLIC AC 27MG-0.8MG
1 TABLET ORAL DAILY
Status: DISCONTINUED | OUTPATIENT
Start: 2023-07-07 | End: 2023-07-09

## 2023-07-06 RX ADMIN — MAGNESIUM SULFATE HEPTAHYDRATE 4 G: 40 INJECTION, SOLUTION INTRAVENOUS at 19:00

## 2023-07-06 RX ADMIN — MAGNESIUM SULFATE HEPTAHYDRATE 2 G: 40 INJECTION, SOLUTION INTRAVENOUS at 18:39

## 2023-07-06 RX ADMIN — SODIUM CHLORIDE, POTASSIUM CHLORIDE, SODIUM LACTATE AND CALCIUM CHLORIDE 50 ML/HR: 600; 310; 30; 20 INJECTION, SOLUTION INTRAVENOUS at 18:38

## 2023-07-06 RX ADMIN — LABETALOL HYDROCHLORIDE 20 MG: 5 INJECTION, SOLUTION INTRAVENOUS at 20:27

## 2023-07-06 RX ADMIN — NIFEDIPINE 20 MG: 10 CAPSULE ORAL at 17:44

## 2023-07-06 RX ADMIN — LABETALOL HYDROCHLORIDE 20 MG: 5 INJECTION, SOLUTION INTRAVENOUS at 21:14

## 2023-07-06 RX ADMIN — MAGNESIUM SULFATE HEPTAHYDRATE 2 G/HR: 40 INJECTION, SOLUTION INTRAVENOUS at 19:38

## 2023-07-06 RX ADMIN — BETAMETHASONE SODIUM PHOSPHATE AND BETAMETHASONE ACETATE 12 MG: 3; 3 INJECTION, SUSPENSION INTRA-ARTICULAR; INTRALESIONAL; INTRAMUSCULAR at 20:05

## 2023-07-06 RX ADMIN — NIFEDIPINE 10 MG: 10 CAPSULE ORAL at 17:19

## 2023-07-06 RX ADMIN — NIFEDIPINE 30 MG: 30 TABLET, FILM COATED, EXTENDED RELEASE ORAL at 22:41

## 2023-07-06 RX ADMIN — ONDANSETRON 4 MG: 2 INJECTION INTRAMUSCULAR; INTRAVENOUS at 21:20

## 2023-07-06 ASSESSMENT — ACTIVITIES OF DAILY LIVING (ADL)
HEARING_DIFFICULTY_OR_DEAF: NO
TOILETING_ISSUES: NO
DIFFICULTY_COMMUNICATING: NO
DRESSING/BATHING_DIFFICULTY: NO
DOING_ERRANDS_INDEPENDENTLY_DIFFICULTY: NO
ADLS_ACUITY_SCORE: 18
ADLS_ACUITY_SCORE: 31
ADLS_ACUITY_SCORE: 31
CHANGE_IN_FUNCTIONAL_STATUS_SINCE_ONSET_OF_CURRENT_ILLNESS/INJURY: NO
CONCENTRATING,_REMEMBERING_OR_MAKING_DECISIONS_DIFFICULTY: NO
WALKING_OR_CLIMBING_STAIRS_DIFFICULTY: NO
WEAR_GLASSES_OR_BLIND: NO
FALL_HISTORY_WITHIN_LAST_SIX_MONTHS: NO
ADLS_ACUITY_SCORE: 18
DIFFICULTY_EATING/SWALLOWING: NO

## 2023-07-06 NOTE — PROGRESS NOTES
Please refer to ultrasound report under 'Imaging' Studies of 'Chart Review' tabs.    Konrad Romano M.D.

## 2023-07-06 NOTE — PROGRESS NOTES
33w0d overall feeling ok, but more uncomfortable.  Much more swollen over the past week.  Good fm, reports BPP was 8/8.  Initial BP was 162/104 and repeat 148/102.  She denies HA, scotomata or RUQ pain.  Discussed my concerns for ghtn vs pre-e vs pre-e with SF and need for work-up in L&D now.  She is agreeable.  We discussed possible mgmt trajectories based on what eval shows.  Questions answered.  L&D aware, MA bringing her there now.  oni

## 2023-07-06 NOTE — H&P
[facility] OB HISTORY AND PHYSICAL    Patient: Carolina Tapia   MRN#: [id number]  YOB: 1985     HPI: Carolina Tapia is a 38 year old  at 33w0d by LMP c/w 6w5d US who was sent in from clinic for elevated BP. Per chart review, patient was seen in clinic by Dr. Ledesma today for routine prenatal visit. Initial BP was 162/104, with repeat 148/102.     Patient is feeling well at present. She reports good fetal movement. Denies LOF, vaginal bleeding, or contractions. She denies fever, headache, vision changes, OB, chest pain, nausea, vomiting, RUQ pain, epigastric pain, LE pain. Patient denies any prior history of hypertension.    Pregnancy notable for:   - H/o STAT CS for placenta abruption  - Recent death of child  - TIANA/MDD  - Bilobed placenta  - Velamentous cord insertion    Patient's previous pregnancies are notable for a history of  x1 followed by STAT CS x1 at 32 weeks in the setting of placental abruption. Her second baby had severe HIE, and recently  at age 4.    OBGYN History:   OB History    Para Term  AB Living   3 2 1 1 0 1   SAB IAB Ectopic Multiple Live Births   0 0 0 0 2      # Outcome Date GA Lbr Hernando/2nd Weight Sex Delivery Anes PTL Lv   3 Current            2  17 32w2d  1.928 kg (4 lb 4 oz) M CS-LTranv  Y LIVE BIRTH      Complications: Abruptio Placenta      Name: CRUZMALE-CAROLINA Swann Term 14 40w6d 17:48 / 02:23 3.751 kg (8 lb 4.3 oz) M Vag-Spont EPI N JEFFERY      Name: Reymundo      Apgar1: 8  Apgar5: 9      Prenatal Lab Results:  Lab Results   Component Value Date    AS Negative 2023    HEPBANG Nonreactive 2023    RPR Non-Reactive 2017    HGB 13.9 2023     GBS Status: Unknown    Patient Active Problem List    Diagnosis Date Noted     Preeclampsia, severe, third trimester 2023     Priority: Medium     High-risk pregnancy, unspecified trimester 2023     Priority: Medium     H/o abruption and STAT c/s at  32wk: severe HIE, child  age 5    MFM Recommendations:  Review calling guidelines at each visits regarding the possible signs or symptoms of abruption including contractions, abdominal pain, vaginal bleeding or a change in fetal  movement patterns  Serial ultrasounds every 4 weeks after comprehensive US at 18-20 weeks to monitor for growth abnormalities  Close monitoring for the development of preeclampsia with increased frequency of OB visits beginning at 26-28 weeks  Weekly BPP at 28 weeks  Delivery is recommended at 39 weeks presuming no indication for earlier delivery arises.       Previous  delivery, antepartum condition or complication 2023     Priority: Medium     History of placenta abruption 2023     Priority: Medium     Family history of thyroid disease in mother 2023     Priority: Medium     Vegetarian diet 2014     Priority: Medium     HGSIL (high grade squamous intraepithelial dysplasia) 2012     Priority: Medium     Past Medical History  Past Medical History:   Diagnosis Date     History of blood transfusion      Preeclampsia, severe, third trimester 2023     Sprain of ankle      Varicella      Past Surgical History  Past Surgical History:   Procedure Laterality Date     BIOPSY CERVICAL, LOCAL EXCISION, SINGLE/MULTIPLE        SECTION N/A 2017    LTCS 32 weeks abruption     MOUTH SURGERY       Family History  Family History   Problem Relation Age of Onset     Thyroid Disease Mother      Hypertension Mother      Arthritis Mother      Hyperlipidemia Father      Diabetes Maternal Grandmother      Cancer Maternal Grandmother      Dementia Paternal Grandmother      No Known Problems Brother      Breast Cancer Paternal Aunt      Social History  Social History     Tobacco Use     Smoking status: Never     Smokeless tobacco: Never   Substance Use Topics     Alcohol use: No     Medications  Medications Prior to Admission   Medication Sig Dispense Refill  Last Dose     Prenatal Vit-Fe Fumarate-FA (PRENATAL MULTIVITAMIN W/IRON) 27-0.8 MG tablet Take 1 tablet by mouth daily   2023     Allergies  Allergies   Allergen Reactions     Hydrocodone-Acetaminophen Nausea and Vomiting      REVIEW OF SYSTEMS:  A 10 point review of systems was completed and was negative other than as noted in the HPI.    PHYSICAL EXAM  BP (!) 163/96 (BP Location: Right arm, Patient Position: Semi-Massey's, Cuff Size: Adult Regular)   Temp 98.2  F (36.8  C) (Oral)   Resp 20   LMP 2022   SpO2 100%   Gen: NAD, well appearing  CV: Regular rate, well perfused  Lungs: Non-labored breathing  Abd: Gravid, non-tender, non-distended  Ext: Trace b/l LE edema    FHT:  Monitoring External  FHT: Baseline 130 bpm; moderate variability; accelerations present; no decelerations  TOCO 2-4 contractions in 10 minutes    Assessment & Plan: 38 year old  at 33w0d by LMP c/w 6w5d US admitted for preeclampsia with severe features based on sustained severe range BPs. Pregnancy is notable for h/o STAT CS at 32 weeks in the setting of placental abruption, with infant with severe HIE and recent loss of that child. Also notable for bilobed placenta with velamentous cord insertion.      # Preeclampsia with severe features  - Admit to antepartum  - Serial BP monitoring  - Antihypertensives: PO nifedipine PRN (no IV access on initial evaluation) for sustained severe range blood pressures (SBP >160, DBP >110 sustained over 15 minutes)  - Labs: HELLP labs, UPC  - Daily weights, strict I&Os  - Begin magnesium sulfate seizure prophylaxis- 4g loading dose, followed by 2g per hour maintenance   - Mg checks q4h   - Will continue for 24 hrs    # R/o placenta abruption  Risk factor of severe preeclampsia. Frequent contractions noted on tocometer, although patient not currently feeling contractions.  - Labs: CBC, PT/INR, PTT, fibrinogen    # PNC  - Rh positive, Rubella immune  - GCT elevated, 3h GTT wnl  - GBS unknown -  swab ordered on admission  - Other prenatal labs wnl  - Imagin/12 Magy EFW 29%, AC 44%, placenta left lateral and bilobed with velamentous cord insertion  - Declined Tdap  - Pap last 22 NILM HPV neg     # FWB  Cat I tracing, reactive  - Continuous fetal monitoring  - BMZ for fetal lung maturity  - NICU consult  - Intrauterine resuscitative measures PRN    Patient discussed with Dr. Felix.    Magaly Sheffield MD  OB/GYN PGY-2  2023 8:21 PM

## 2023-07-06 NOTE — NURSING NOTE
Patient reports positive fetal movement, denies contractions, leaking of fluid, or bleeding. Patient denies headache related to preeclampsia.  SBAR given to GAVIN HEAD, see their note in Epic.

## 2023-07-07 LAB
ALT SERPL W P-5'-P-CCNC: 62 U/L (ref 0–50)
ALT SERPL W P-5'-P-CCNC: 71 U/L (ref 0–50)
AST SERPL W P-5'-P-CCNC: 55 U/L (ref 0–45)
AST SERPL W P-5'-P-CCNC: 81 U/L (ref 0–45)
CREAT SERPL-MCNC: 0.57 MG/DL (ref 0.51–0.95)
CREAT SERPL-MCNC: 0.6 MG/DL (ref 0.51–0.95)
ERYTHROCYTE [DISTWIDTH] IN BLOOD BY AUTOMATED COUNT: 12.4 % (ref 10–15)
ERYTHROCYTE [DISTWIDTH] IN BLOOD BY AUTOMATED COUNT: 12.5 % (ref 10–15)
GFR SERPL CREATININE-BSD FRML MDRD: >90 ML/MIN/1.73M2
GFR SERPL CREATININE-BSD FRML MDRD: >90 ML/MIN/1.73M2
HCT VFR BLD AUTO: 38.7 % (ref 35–47)
HCT VFR BLD AUTO: 38.8 % (ref 35–47)
HGB BLD-MCNC: 13.5 G/DL (ref 11.7–15.7)
HGB BLD-MCNC: 13.6 G/DL (ref 11.7–15.7)
MCH RBC QN AUTO: 33 PG (ref 26.5–33)
MCH RBC QN AUTO: 33 PG (ref 26.5–33)
MCHC RBC AUTO-ENTMCNC: 34.9 G/DL (ref 31.5–36.5)
MCHC RBC AUTO-ENTMCNC: 35.1 G/DL (ref 31.5–36.5)
MCV RBC AUTO: 94 FL (ref 78–100)
MCV RBC AUTO: 95 FL (ref 78–100)
PLATELET # BLD AUTO: 194 10E3/UL (ref 150–450)
PLATELET # BLD AUTO: 202 10E3/UL (ref 150–450)
RBC # BLD AUTO: 4.09 10E6/UL (ref 3.8–5.2)
RBC # BLD AUTO: 4.12 10E6/UL (ref 3.8–5.2)
SARS-COV-2 RNA RESP QL NAA+PROBE: NEGATIVE
WBC # BLD AUTO: 14.1 10E3/UL (ref 4–11)
WBC # BLD AUTO: 15.6 10E3/UL (ref 4–11)

## 2023-07-07 PROCEDURE — 84450 TRANSFERASE (AST) (SGOT): CPT

## 2023-07-07 PROCEDURE — 87635 SARS-COV-2 COVID-19 AMP PRB: CPT

## 2023-07-07 PROCEDURE — 258N000003 HC RX IP 258 OP 636

## 2023-07-07 PROCEDURE — 250N000013 HC RX MED GY IP 250 OP 250 PS 637

## 2023-07-07 PROCEDURE — 84460 ALANINE AMINO (ALT) (SGPT): CPT | Performed by: OBSTETRICS & GYNECOLOGY

## 2023-07-07 PROCEDURE — C9803 HOPD COVID-19 SPEC COLLECT: HCPCS

## 2023-07-07 PROCEDURE — 99233 SBSQ HOSP IP/OBS HIGH 50: CPT

## 2023-07-07 PROCEDURE — 36415 COLL VENOUS BLD VENIPUNCTURE: CPT

## 2023-07-07 PROCEDURE — 36415 COLL VENOUS BLD VENIPUNCTURE: CPT | Performed by: OBSTETRICS & GYNECOLOGY

## 2023-07-07 PROCEDURE — 82565 ASSAY OF CREATININE: CPT | Performed by: OBSTETRICS & GYNECOLOGY

## 2023-07-07 PROCEDURE — 250N000011 HC RX IP 250 OP 636: Mod: JZ

## 2023-07-07 PROCEDURE — 99232 SBSQ HOSP IP/OBS MODERATE 35: CPT | Mod: GC | Performed by: OBSTETRICS & GYNECOLOGY

## 2023-07-07 PROCEDURE — 85027 COMPLETE CBC AUTOMATED: CPT | Performed by: OBSTETRICS & GYNECOLOGY

## 2023-07-07 PROCEDURE — 82565 ASSAY OF CREATININE: CPT

## 2023-07-07 PROCEDURE — 85027 COMPLETE CBC AUTOMATED: CPT

## 2023-07-07 PROCEDURE — 84450 TRANSFERASE (AST) (SGOT): CPT | Performed by: OBSTETRICS & GYNECOLOGY

## 2023-07-07 PROCEDURE — 120N000002 HC R&B MED SURG/OB UMMC

## 2023-07-07 PROCEDURE — 84460 ALANINE AMINO (ALT) (SGPT): CPT

## 2023-07-07 RX ORDER — NIFEDIPINE 30 MG/1
30 TABLET, EXTENDED RELEASE ORAL ONCE
Status: COMPLETED | OUTPATIENT
Start: 2023-07-07 | End: 2023-07-07

## 2023-07-07 RX ORDER — NIFEDIPINE 30 MG/1
60 TABLET, EXTENDED RELEASE ORAL EVERY 24 HOURS
Status: DISCONTINUED | OUTPATIENT
Start: 2023-07-08 | End: 2023-07-09

## 2023-07-07 RX ADMIN — MAGNESIUM SULFATE HEPTAHYDRATE 2 G/HR: 40 INJECTION, SOLUTION INTRAVENOUS at 15:11

## 2023-07-07 RX ADMIN — NIFEDIPINE 30 MG: 30 TABLET, FILM COATED, EXTENDED RELEASE ORAL at 08:19

## 2023-07-07 RX ADMIN — NIFEDIPINE 30 MG: 30 TABLET, FILM COATED, EXTENDED RELEASE ORAL at 19:54

## 2023-07-07 RX ADMIN — SODIUM CHLORIDE, POTASSIUM CHLORIDE, SODIUM LACTATE AND CALCIUM CHLORIDE 50 ML/HR: 600; 310; 30; 20 INJECTION, SOLUTION INTRAVENOUS at 13:49

## 2023-07-07 RX ADMIN — BETAMETHASONE SODIUM PHOSPHATE AND BETAMETHASONE ACETATE 12 MG: 3; 3 INJECTION, SUSPENSION INTRA-ARTICULAR; INTRALESIONAL; INTRAMUSCULAR at 18:41

## 2023-07-07 ASSESSMENT — ACTIVITIES OF DAILY LIVING (ADL)
ADLS_ACUITY_SCORE: 18

## 2023-07-07 NOTE — PROGRESS NOTES
SPIRITUAL HEALTH SERVICES Progress Note  [facility] ([facility]) 4BOB    Referral Source: Admission request    Summary: Met with Muna and introduced self and oriented her and her mother, Rebecca to [facility]. Muna hopes to be at 34 weeks in a week and is wishing for the best outcome. She elected a visit from [facility] as she awaits delivery and is open to support. Muna is Yazidi and is open to support from anyone. I will follow up per LOS and as needed/requested.     Gillian Butts  Chaplain Resident  Pager [phone]    * [facility] remains available 24/7 for emergent requests/referrals, either by having the switchboard page the on-call  or by entering an ASAP/STAT consult in Epic (this will also page the on-call ). Routine Epic consults receive an initial response within 24 hours.*

## 2023-07-07 NOTE — PROGRESS NOTES
Pt arrived to triage from clinic at 1630 d/t elevated pressure to r/o pre-e. Upon arrival, pt SBP in severe range, sustained. MD notified. Given nifedepine PO per orders, see MAR. Given niefedpine 20 mg upon subsequent check d/t continued severe range BP, see flowsheets and MAR. BP currently elevated to mild range. Pt denies HA, dizziness, blurry vision, pain to RUQ. Pt has +2-3 edema to BLE and BUE. Magnesium infusion started per MAR order. Labs sent as ordered, see results. Seen by MD Sheffield. FHT with moderate variability, accelerations present, no decelerations present, rate WNL. Pt denies feeling contractions, noted on monitor. See flowsheets. RN contacted MD Arevalo regarding pt request for PO intake, and concern for worsened UE edema. Report given to tati Mcmillan RN. See flowsheets for assessment details.

## 2023-07-07 NOTE — PLAN OF CARE
Muna doing well this shift. She denies s/s preeclampsia. Muna had a headache this morning that she described as mild, but felt like it was due to not being able to eat for several hours. She denied wanting to try tylenol for this headache and it went away after she ate lunch and rested. Nicu consult done this morning, Muna and her partner feel that all of their questions were addressed. Blood pressures elevated this shift, but no severe range blood pressures noted (see flowsheet). Muna denies visual changes, nausea, and RUQ pain. FHR continuously monitored this shift due to magnesium infusion.  with moderate variability. Accels present, decels were not present. Occasional contractions that are not felt by patient. Dr. Carcamo and Dr. Ward at bedside this afternoon to discuss lab results. If liver labs continue to trend up, or patient's clinical picture changes, will consider delivery sooner than beta window/ 34 weeks. Patient is in agreement with plan and states that she wants to do whatever is safest for her and her baby. She expresses having a lot of anxiety surrounding abruption, as her second baby was an emergency  section due to abruption. Magnesium stopped at 24 hours- 1840. Second Betamethasone given at that time as well. Gabriel at bedside intermittently today. Will continue to closely monitor patient and fetal status.

## 2023-07-07 NOTE — PROVIDER NOTIFICATION
07/06/23 2241   Provider Notification   Provider Name/Title Dr.Ayers Avendaño   Method of Notification Electronic Page   Notification Reason Other (Comment)     Provider notified to clarify which algorithm to follow because PO nifedipine was also ordered. Provider clarified to follow algorithm for labetalol IV if BP in severe range.

## 2023-07-07 NOTE — PROVIDER NOTIFICATION
Dr. Arevalo notified at 2115 via phone of sustained severe range BP, RN gave 20mg IV labetalol, provider to come to bedside to assess

## 2023-07-07 NOTE — PLAN OF CARE
Patient VSS. Blood pressures have been stable overnight. Patient rested between cares overnight. No signs of magnesium toxicity. Denies pain, vaginal bleeding and leakage of fluid. Partner Gabriel at bedside, supportive and attentive. Continue plan of care and notify provider if change in status.

## 2023-07-07 NOTE — PROGRESS NOTES
Antepartum Progress Note      Subjective: Patient is feeling ok today. Headache has resolved after her nap. Feels like her eyes are swollen, but no vision changes. No chest pain or SOB. No RUQ pain.      Objective:  Vitals:    23 0650 23 0651 23 0817 23 0914   BP:  138/89 (!) 144/95 120/69   BP Location:  Right arm     Patient Position:  Semi-Massey's     Cuff Size:  Adult Regular     Resp:  18     Temp:  98.2  F (36.8  C)     TempSrc:  Oral     SpO2: 100% 100% 100% 100%       I/O last 3 completed shifts:  In: 118 [P.O.:118]  Out: 2800 [Urine:2800]    Gen: Resting comfortably in bed, NAD  CV: RRR  Resp: CTAB  Abd: Gravid, non-tender, non-distended  Ext: non-tender, trace LE edema, 2+ patellar reflexes, 1 beat clonus    FHT: , moderate variability, + accels, no decels  [place]: rare contractions    Labs:   Component Ref Range & Units  6:45 AM 1 d ago 1 mo ago 2 mo ago 6 mo ago 1 yr ago    WBC Count 4.0 - 11.0 10e3/uL 14.1 High   11.8 High    11.9 High   9.2  7.1     RBC Count 3.80 - 5.20 10e6/uL 4.09  4.16   3.68 Low   4.03  4.24     Hemoglobin 11.7 - 15.7 g/dL 13.5  13.9  11.8  12.1  12.9  13.5     Hematocrit 35.0 - 47.0 % 38.7  39.4   35.3  36.6  38.5     MCV 78 - 100 fL 95  95   96  91  91     MCH 26.5 - 33.0 pg 33.0  33.4 High    32.9  32.0  31.8     MCHC 31.5 - 36.5 g/dL 34.9  35.3   34.3  35.2  35.1     RDW 10.0 - 15.0 % 12.4  12.3   12.5  11.7  11.7     Platelet Count 150 - 450 10e3/uL 194  207   258  261  254      AST: 81  ALT: 71  Creatinine: 0.60  Fibrinogen: 394  APTT: 26    Assessment: Muna Tapia is a 38 year old  @ 33w1d by LMP c/w 6w5d US admitted for preeclampsia with severe features based on sustained severe range BPs. Pregnancy is notable for h/o STAT CS at 32 weeks in the setting of placental abruption, with infant with severe HIE and recent loss of that child. Also notable for bilobed placenta with velamentous cord insertion.      # Preeclampsia with  severe features  Discussed diagnosis in detail with patient, and that only cure for pre-eclampsia is delivery. Therefore, decisions regarding delivery timing are with the goal of balancing both maternal and fetal risks and benefits. Typically, if patient's are candidates for expectant management, the goal is to reach 34 weeks. Discussed with MFM Dr. Sesay, and given patient's history and overall clinical picture at 33 weeks, it is very reasonable to consider delivery sooner given the limited fetal benefit from the additional week in utero and the risk to maternal health. In particular, patient's LFTs are elevated this AM, although not to twice the upper limit of normal. Will repeat LFTs now, if AST/ALT are increased above the upper limit of normal, will recommend proceeding with CS today. If they are stable or downtrending, will tentatively plan for delivery on  once patient is through her BMZ window. Patient and  expressed understanding, will further discuss delivery timing as patient's clinical course evolves.  - Repeat HELLP labs now, NPO while awaiting result  - Serial BP monitoring   - Continue Nifedipine 30   - Adequate urine output   - Continue magnesium at this time, will stop at 24h (1800) if not moving forward with delivery     # R/o placenta abruption  Risk factor of severe preeclampsia. Contractions now less frequent. Abruption labs wnl, low likelihood of abruption given these negative labs and patient's asymptomatic status.      # PNC  - Rh positive, Rubella immune  - GCT elevated, 3h GTT wnl  - GBS unknown - swab ordered on admission, pending   - Other prenatal labs wnl  - Imagin/12 Magy EFW 29%, AC 44%, placenta left lateral and bilobed with velamentous cord insertion  - Declined Tdap  - Pap last 22 NILM HPV neg                   # FWB  - Appropriate for gestational age, reactive and reassuring  - Continuous fetal monitoring  - [facility] for fetal lung maturity, s/p 1 dose, will  receive next dose tonight  - s/p NICU consult   - Intrauterine resuscitative measures PRN    Patient seen and discussed with Dr. Carcamo.    Tea Ward MD  OB/GYN Resident, PGY-3        Physician Attestation   I saw this patient with the resident and agree with the resident/fellow's findings and plan of care as documented in the note.      Key findings: 38 year old  at 33w1d with pre-eclampsia with severe features by blood pressure criteria. LFTs increased this morning but then decreased on recheck this afternoon. Discussed weighing risks and benefits of expectant management of preeclampsia with severe features at 33 weeks. Will consider  section on  after she is through BMZ window. Continue daily labs and close monitoring. Questions answered.       Please see A&P for additional details of medical decision making.    I have personally reviewed the following data over the past 24 hrs:    15.6 (H)  \   13.6   / 202     N/A N/A N/A /  N/A   N/A N/A 0.57 \       ALT: 62 (H) AST: 55 (H) AP: N/A TBILI: N/A   ALB: N/A TOT PROTEIN: N/A LIPASE: N/A         Tea Carcamo MD  Date of Service (when I saw the patient): 23

## 2023-07-07 NOTE — PROVIDER NOTIFICATION
07/07/23 0254   Provider Notification   Provider Name/Title Dr.Ayers Avendaño   Method of Notification Electronic Page   Notification Reason Other (Comment)  (pink discharge)     Provider notified as patient reports pink tinge discharge when wiping. Informed patient that discharge could be result of cervical exam. Patient denies LOF and vaginal bleeding. Will continue to monitor for change.

## 2023-07-07 NOTE — DISCHARGE SUMMARY
[facility] Discharge Summary    Muna Tapia MRN# [id number]   Age: 38 year old YOB: 1985     Date of Admission:  2023  Date of Discharge::  23  Admitting Physician:  Tea Felix MD  Discharge Physician:  Cinthya Wagner MD          Admission Diagnoses:   - IUP at 33w0d  - H/o STAT CS for placenta abruption  - Recent death of child  - TIANA/MDD  - Bilobed placenta  - Velamentous cord insertion  -PreE w/ SF           Discharge Diagnosis:     -IUP at 33w0d, now delivered  - 2-7 same           Procedures:     Procedure(s): Repeat low transverse  section with 2 layer closure via Pfannenstiel  skin incision  Spinal anesthesia with duramorph        Intraoperative course   The procedure was uncomplicated.   mL.  See operative report for details.     Operative findings:   1. Single, viable female infant at 0941 hours on 2023. Apgars of 9 and 9 at one and five minutes.  Birth weight: 1840 g.  Fetal presentation: OA. Amniotic fluid: clear.    2. Arterial cord gases not taken.    3. Placenta intact with 3 vessel cord.     4. Small subserosal fibroid about 1 x 2 cm on posterior left uterus. Many subserosal fibroids <0.5 cm scattered on posterior side of the uterus. Otherwise normal appearing uterus, fallopian tubes, ovaries.   5.  No intraabdominal adhesions.  Minimal abdominal wall adhesions.       Postpartum Course   The patient's hospital course was complicated by PreE w/ SF. She is s/p 24 hours magnesium. Her blood pressure management and titration of medications was difficult to stabilize but on POD#3 it was felt she needed to go home for both mental health and care of child. She had not had high mild or severe range pressures for 24 hours/  She recovered as anticipated and experienced no post-operative complications.  On discharge, her pain was well controlled. Vaginal bleeding is similar to peak menstrual flow.  Voiding without difficulty.  Ambulating well,  tolerating a normal diet, and has resumption of bowel function.  No fever or significant wound drainage.  Pumping well.  Infant is in NICU. She was discharged on post-partum day #3 PM.        Post-partum hemoglobin:   Hemoglobin   Date Value Ref Range Status   07/09/2023 12.2 11.7 - 15.7 g/dL Final     Contraception: vasectomy  Rh positive, no Rhogam indicated  Rubella immune, no MMR indicated         Medications Prior to Admission:     Medications Prior to Admission   Medication Sig Dispense Refill Last Dose     Prenatal Vit-Fe Fumarate-FA (PRENATAL MULTIVITAMIN W/IRON) 27-0.8 MG tablet Take 1 tablet by mouth daily   7/6/2023             Discharge Medications:        Review of your medicines      START taking      Dose / Directions   acetaminophen 325 MG tablet  Commonly known as: TYLENOL      Dose: 325-650 mg  Take 1-2 tablets (325-650 mg) by mouth every 6 hours as needed for mild pain  Quantity: 60 tablet  Refills: 0     hydrochlorothiazide 12.5 MG tablet  Commonly known as: HYDRODIURIL      Dose: 12.5 mg  Take 1 tablet (12.5 mg) by mouth daily  Quantity: 5 tablet  Refills: 0     ibuprofen 600 MG tablet  Commonly known as: ADVIL/MOTRIN      Dose: 600 mg  Take 1 tablet (600 mg) by mouth every 6 hours as needed for moderate pain  Quantity: 60 tablet  Refills: 0     labetalol 200 MG tablet  Commonly known as: NORMODYNE      Dose: 800 mg  Take 4 tablets (800 mg) by mouth 3 times daily  Quantity: 720 tablet  Refills: 0     NIFEdipine ER 90 MG 24 hr tablet  Commonly known as: ADALAT CC      Dose: 90 mg  Start taking on: July 13, 2023  Take 1 tablet (90 mg) by mouth every 24 hours  Quantity: 90 tablet  Refills: 3     SENNA-docusate sodium 8.6-50 MG tablet  Commonly known as: SENNA S      Dose: 1 tablet  Take 1 tablet by mouth At Bedtime  Quantity: 30 tablet  Refills: 0        CONTINUE these medicines which have NOT CHANGED      Dose / Directions   prenatal multivitamin w/iron 27-0.8 MG tablet      Dose: 1 tablet  Take 1  tablet by mouth daily  Refills: 0           Where to get your medicines      These medications were sent to [id number] - [place], [place] - [address] AT [facility] [address]  [address], [place] [address]    Hours: 24-hours Phone: [phone]     acetaminophen 325 MG tablet    hydrochlorothiazide 12.5 MG tablet    ibuprofen 600 MG tablet    labetalol 200 MG tablet    NIFEdipine ER 90 MG 24 hr tablet    SENNA-docusate sodium 8.6-50 MG tablet               Consultations:   M  NICU  Social Work          Brief Admission History   38 year old  at 33w0d by LMP c/w 6w5d US admitted for preeclampsia with severe features based on sustained severe range BPs. She is asymptomatic. Patient was kevin on monitor but not feeling them. Cervix was c/l/h.             Hospital Course:   Patient was admitted for pre-eclampsia with severe features and on HD#0 received a total of 40 mg labetalol and 60 mg Nifedipine for blood pressure management. Patient received 24 hours magnesium, two doses BMZ (, )  for pre-eclamspia with severe features. HELLP labs wnl.     Dr. Parham discussed with patient on HD#2 () the options of moving forward with c/s or expectant management to 34w0d. Given history in prior pregnancy, patient agreed with plan for c/s on  at 0900.    She had BP's low and high mild range, requiring immediate acting labetalol on . Labetalol was increased up to 600mg BID.    Patient was discharge on Labetalol 600 TID, Nifed 90 and hydrochlorothiazide 12.5mg             Discharge Instructions and Follow-Up:     Discharge diet: Regular   Discharge activity: No lifting greater than 20 lbs, pushing, pulling, or other strenuous activity for 6 weeks. Pelvic rest for 6 weeks including no sexual intercourse, tampons, or douching. No driving until you can slam on the breaks without pain or while on narcotic pain medications.    Discharge follow-up: Follow up with  primary OB for routine postpartum visit in 6 weeks  BP check in 3-5 days with home health, 2 week incision check   Wound care: Keep incision clean and dry           Discharge Disposition:     Discharged to home     Tate M Irina VALLADARES MA  [facility]- PGY2  10:09 PM July 12, 2023              Physician Attestation   I, Cinthya Wagner, have seen and examined this patient.  I have reviewed the above note and agree with discharge plan as documented in the above note. Discussed postpartum instructions/restrictions and follow up.       Cinthya Wagner MD  Date of Service (when I saw the patient): 7/12/23      Senthil Mckeon  [employer], MS3  07/06/2023 11:24 PM

## 2023-07-07 NOTE — PROGRESS NOTES
[facility]  Magnesium Check Note    S:   Patient is feeling well.  Denies headache, vision changes/spots in vision, chest pain, shortness of breath, RUQ or epigastric pain. No contractions. Pink discharge with wiping after cervical exam, no more bleeding.     O:  Patient Vitals for the past 4 hrs:   BP Temp Temp src Resp SpO2   23 0400 (!) 143/80 -- -- 20 98 %   23 0249 136/82 98.4  F (36.9  C) Oral 18 100 %   23 0200 130/85 -- -- 16 97 %   23 0130 (!) 142/88 -- -- -- 97 %   23 0125 -- -- -- -- 97 %   23 0120 -- -- -- -- 98 %   23 0115 -- -- -- -- 98 %   23 0110 -- -- -- -- 98 %   23 0105 -- -- -- -- 98 %   23 0100 139/87 -- -- 16 98 %   23 0057 -- -- -- -- 99 %   23 0052 -- -- -- -- 99 %   23 0047 -- -- -- -- 100 %   23 0042 -- -- -- -- 98 %   23 0037 -- -- -- -- 99 %   23 0028 -- -- -- -- 99 %     Gen: Appears well, NAD   CV:  RRR, no murmurs  Pulm:  CTAB, no wheezes or crackles  Abd:  Gravid, soft, non-tender  Ext:  Patellar reflexes 3+    FHT: Baseline 125, moderate variability, + accelerations, no decelerations  Peculiar: 0 contractions in 10 minutes    I/O:  I/O last 3 completed shifts:  In: -   Out: 300 [Urine:300]    A/P:  Muna Tapia is a 38 year old  at 33w1d by LMP c/w 6w5d US, now HD#2 for preeclampsia with severe features. Patient is receiving IV Magnesium for seizure prophylaxis. Doing well. No s/s of worsening preeclampsia or magnesium toxicity.     # Pre-eclampsia with severe features:   - No signs or symptoms of magnesium toxicity or worsening pre-eclampsia  - Blood pressure improved. IV antihypertensives PRN for BP >160/110  - D#2 nifed 30   - nifed 10, 20 (1744), labet 20, 20 (, )  - Mg 4g () > 2g   - BMZ x1 ()  - abruption labs, HELLP nl. Repeat ordered for this AM    #Pink discharge   Discussed likely secondary to exam. Overall labs and FHT have  been reassuring. Low concern for abruption at this time, dicussed with patient. Will continue to monitor closely.     FWB:  -Category 1 FHT    Marlene Avendaño MD, MPH  4:23 AM 7/7/2023

## 2023-07-07 NOTE — PROVIDER NOTIFICATION
07/06/23 2027   Provider Notification   Provider Name/Title    Method of Notification Electronic Page   Notification Reason Maternal Vital Sign Change;Other (Comment)  (elevated BP)     Provider notified of patient severe range blood pressures, at 1930 161/96 and at 2011 163/99. Hypertensive medication management algorithm followed, 20 mg of labetalol given. At 2025 BP was 168/103 and at 2055 161/97, provider notifed via phone by GLENIS Riley nurse and another 20 mg of labetalol given. Interventions effective.

## 2023-07-08 ENCOUNTER — ANESTHESIA EVENT (OUTPATIENT)
Dept: OBGYN | Facility: CLINIC | Age: 38
End: 2023-07-08
Payer: COMMERCIAL

## 2023-07-08 LAB
ABO/RH(D): NORMAL
ALT SERPL W P-5'-P-CCNC: 45 U/L (ref 0–50)
ALT SERPL W P-5'-P-CCNC: 47 U/L (ref 0–50)
ANTIBODY SCREEN: NEGATIVE
AST SERPL W P-5'-P-CCNC: 26 U/L (ref 0–45)
AST SERPL W P-5'-P-CCNC: 30 U/L (ref 0–45)
CREAT SERPL-MCNC: 0.6 MG/DL (ref 0.51–0.95)
CREAT SERPL-MCNC: 0.61 MG/DL (ref 0.51–0.95)
ERYTHROCYTE [DISTWIDTH] IN BLOOD BY AUTOMATED COUNT: 12.7 % (ref 10–15)
ERYTHROCYTE [DISTWIDTH] IN BLOOD BY AUTOMATED COUNT: 12.9 % (ref 10–15)
GFR SERPL CREATININE-BSD FRML MDRD: >90 ML/MIN/1.73M2
GFR SERPL CREATININE-BSD FRML MDRD: >90 ML/MIN/1.73M2
HCT VFR BLD AUTO: 36.4 % (ref 35–47)
HCT VFR BLD AUTO: 38.7 % (ref 35–47)
HGB BLD-MCNC: 12.7 G/DL (ref 11.7–15.7)
HGB BLD-MCNC: 13.2 G/DL (ref 11.7–15.7)
MCH RBC QN AUTO: 33.8 PG (ref 26.5–33)
MCH RBC QN AUTO: 33.8 PG (ref 26.5–33)
MCHC RBC AUTO-ENTMCNC: 34.1 G/DL (ref 31.5–36.5)
MCHC RBC AUTO-ENTMCNC: 34.9 G/DL (ref 31.5–36.5)
MCV RBC AUTO: 97 FL (ref 78–100)
MCV RBC AUTO: 99 FL (ref 78–100)
PLATELET # BLD AUTO: 171 10E3/UL (ref 150–450)
PLATELET # BLD AUTO: 197 10E3/UL (ref 150–450)
RBC # BLD AUTO: 3.76 10E6/UL (ref 3.8–5.2)
RBC # BLD AUTO: 3.91 10E6/UL (ref 3.8–5.2)
SPECIMEN EXPIRATION DATE: NORMAL
WBC # BLD AUTO: 14.4 10E3/UL (ref 4–11)
WBC # BLD AUTO: 15.2 10E3/UL (ref 4–11)

## 2023-07-08 PROCEDURE — 84460 ALANINE AMINO (ALT) (SGPT): CPT

## 2023-07-08 PROCEDURE — 250N000013 HC RX MED GY IP 250 OP 250 PS 637

## 2023-07-08 PROCEDURE — 36415 COLL VENOUS BLD VENIPUNCTURE: CPT

## 2023-07-08 PROCEDURE — 82565 ASSAY OF CREATININE: CPT | Performed by: OBSTETRICS & GYNECOLOGY

## 2023-07-08 PROCEDURE — 84460 ALANINE AMINO (ALT) (SGPT): CPT | Performed by: OBSTETRICS & GYNECOLOGY

## 2023-07-08 PROCEDURE — 85027 COMPLETE CBC AUTOMATED: CPT | Performed by: OBSTETRICS & GYNECOLOGY

## 2023-07-08 PROCEDURE — 84450 TRANSFERASE (AST) (SGOT): CPT

## 2023-07-08 PROCEDURE — 120N000002 HC R&B MED SURG/OB UMMC

## 2023-07-08 PROCEDURE — 86901 BLOOD TYPING SEROLOGIC RH(D): CPT | Performed by: OBSTETRICS & GYNECOLOGY

## 2023-07-08 PROCEDURE — 86850 RBC ANTIBODY SCREEN: CPT | Performed by: OBSTETRICS & GYNECOLOGY

## 2023-07-08 PROCEDURE — 85027 COMPLETE CBC AUTOMATED: CPT

## 2023-07-08 PROCEDURE — 87653 STREP B DNA AMP PROBE: CPT

## 2023-07-08 PROCEDURE — 82565 ASSAY OF CREATININE: CPT

## 2023-07-08 PROCEDURE — 84450 TRANSFERASE (AST) (SGOT): CPT | Performed by: OBSTETRICS & GYNECOLOGY

## 2023-07-08 PROCEDURE — 36415 COLL VENOUS BLD VENIPUNCTURE: CPT | Performed by: OBSTETRICS & GYNECOLOGY

## 2023-07-08 RX ORDER — OXYTOCIN/0.9 % SODIUM CHLORIDE 30/500 ML
100-340 PLASTIC BAG, INJECTION (ML) INTRAVENOUS CONTINUOUS PRN
Status: DISCONTINUED | OUTPATIENT
Start: 2023-07-08 | End: 2023-07-13 | Stop reason: HOSPADM

## 2023-07-08 RX ORDER — CEFAZOLIN SODIUM 2 G/100ML
2 INJECTION, SOLUTION INTRAVENOUS SEE ADMIN INSTRUCTIONS
Status: DISCONTINUED | OUTPATIENT
Start: 2023-07-08 | End: 2023-07-09 | Stop reason: HOSPADM

## 2023-07-08 RX ORDER — CEFAZOLIN SODIUM 2 G/100ML
2 INJECTION, SOLUTION INTRAVENOUS
Status: DISCONTINUED | OUTPATIENT
Start: 2023-07-08 | End: 2023-07-09 | Stop reason: HOSPADM

## 2023-07-08 RX ORDER — MISOPROSTOL 200 UG/1
800 TABLET ORAL
Status: DISCONTINUED | OUTPATIENT
Start: 2023-07-08 | End: 2023-07-09 | Stop reason: HOSPADM

## 2023-07-08 RX ORDER — METHYLERGONOVINE MALEATE 0.2 MG/ML
200 INJECTION INTRAVENOUS
Status: DISCONTINUED | OUTPATIENT
Start: 2023-07-08 | End: 2023-07-09 | Stop reason: HOSPADM

## 2023-07-08 RX ORDER — ACETAMINOPHEN 325 MG/1
975 TABLET ORAL ONCE
Status: COMPLETED | OUTPATIENT
Start: 2023-07-08 | End: 2023-07-09

## 2023-07-08 RX ORDER — OXYTOCIN 10 [USP'U]/ML
10 INJECTION, SOLUTION INTRAMUSCULAR; INTRAVENOUS
Status: DISCONTINUED | OUTPATIENT
Start: 2023-07-08 | End: 2023-07-13 | Stop reason: HOSPADM

## 2023-07-08 RX ORDER — CITRIC ACID/SODIUM CITRATE 334-500MG
30 SOLUTION, ORAL ORAL
Status: COMPLETED | OUTPATIENT
Start: 2023-07-08 | End: 2023-07-09

## 2023-07-08 RX ORDER — MISOPROSTOL 200 UG/1
400 TABLET ORAL
Status: DISCONTINUED | OUTPATIENT
Start: 2023-07-08 | End: 2023-07-09 | Stop reason: HOSPADM

## 2023-07-08 RX ORDER — TRANEXAMIC ACID 10 MG/ML
1 INJECTION, SOLUTION INTRAVENOUS EVERY 30 MIN PRN
Status: DISCONTINUED | OUTPATIENT
Start: 2023-07-08 | End: 2023-07-09 | Stop reason: HOSPADM

## 2023-07-08 RX ORDER — CARBOPROST TROMETHAMINE 250 UG/ML
250 INJECTION, SOLUTION INTRAMUSCULAR
Status: DISCONTINUED | OUTPATIENT
Start: 2023-07-08 | End: 2023-07-09 | Stop reason: HOSPADM

## 2023-07-08 RX ORDER — NALBUPHINE HYDROCHLORIDE 10 MG/ML
2.5-5 INJECTION, SOLUTION INTRAMUSCULAR; INTRAVENOUS; SUBCUTANEOUS EVERY 6 HOURS PRN
Status: CANCELLED | OUTPATIENT
Start: 2023-07-08

## 2023-07-08 RX ORDER — LIDOCAINE 40 MG/G
CREAM TOPICAL
Status: DISCONTINUED | OUTPATIENT
Start: 2023-07-08 | End: 2023-07-09 | Stop reason: HOSPADM

## 2023-07-08 RX ORDER — SODIUM CHLORIDE, SODIUM LACTATE, POTASSIUM CHLORIDE, CALCIUM CHLORIDE 600; 310; 30; 20 MG/100ML; MG/100ML; MG/100ML; MG/100ML
INJECTION, SOLUTION INTRAVENOUS CONTINUOUS
Status: DISCONTINUED | OUTPATIENT
Start: 2023-07-08 | End: 2023-07-09 | Stop reason: HOSPADM

## 2023-07-08 RX ORDER — OXYTOCIN/0.9 % SODIUM CHLORIDE 30/500 ML
340 PLASTIC BAG, INJECTION (ML) INTRAVENOUS CONTINUOUS PRN
Status: DISCONTINUED | OUTPATIENT
Start: 2023-07-08 | End: 2023-07-09 | Stop reason: HOSPADM

## 2023-07-08 RX ORDER — OXYTOCIN 10 [USP'U]/ML
10 INJECTION, SOLUTION INTRAMUSCULAR; INTRAVENOUS
Status: DISCONTINUED | OUTPATIENT
Start: 2023-07-08 | End: 2023-07-09 | Stop reason: HOSPADM

## 2023-07-08 RX ORDER — FENTANYL CITRATE-0.9 % NACL/PF 10 MCG/ML
100 PLASTIC BAG, INJECTION (ML) INTRAVENOUS EVERY 5 MIN PRN
Status: CANCELLED | OUTPATIENT
Start: 2023-07-08

## 2023-07-08 RX ADMIN — NIFEDIPINE 60 MG: 30 TABLET, FILM COATED, EXTENDED RELEASE ORAL at 06:09

## 2023-07-08 ASSESSMENT — ACTIVITIES OF DAILY LIVING (ADL)
ADLS_ACUITY_SCORE: 18

## 2023-07-08 NOTE — ANESTHESIA PREPROCEDURE EVALUATION
Anesthesia Pre-Procedure Evaluation    Patient: Muna Tapia   MRN: [id number] : 1985        Procedure : Procedure(s):   SECTION, WITH possible POSTPARTUM TUBAL LIGATION          Past Medical History:   Diagnosis Date     History of blood transfusion      Preeclampsia, severe, third trimester 2023     Sprain of ankle      Varicella       Past Surgical History:   Procedure Laterality Date     BIOPSY CERVICAL, LOCAL EXCISION, SINGLE/MULTIPLE        SECTION N/A 2017    LTCS 32 weeks abruption     MOUTH SURGERY        Allergies   Allergen Reactions     Hydrocodone-Acetaminophen Nausea and Vomiting      Social History     Tobacco Use     Smoking status: Never     Smokeless tobacco: Never   Substance Use Topics     Alcohol use: No      Wt Readings from Last 1 Encounters:   23 73.6 kg (162 lb 3.2 oz)        Anesthesia Evaluation   Pt has had prior anesthetic. Type: Regional.    No history of anesthetic complications       ROS/MED HX  ENT/Pulmonary:       Neurologic:  - neg neurologic ROS     Cardiovascular: Comment: PreE w/ SF (sustained severe range BPs)    (+) --PIH and Severe and Mg ++ gtt ---    METS/Exercise Tolerance:     Hematologic: Comments: Plt 171  Hgb 12.7    (+) history of blood transfusion,     Musculoskeletal:  - neg musculoskeletal ROS     GI/Hepatic:  - neg GI/hepatic ROS     Renal/Genitourinary:       Endo:  - neg endo ROS     Psychiatric/Substance Use:     (+) psychiatric history anxiety and depression     Infectious Disease:  - neg infectious disease ROS     Malignancy:  - neg malignancy ROS     Other:   38 year old  at 33w0d     Hx STAT CS @32w for placenta abruption  Recent death of child  Infant with Hypoxic Ischemic Encephalopathy  Bilobed placenta  Velamentous cord insertion   (+) , previous ,         Physical Exam    Airway        Mallampati: II   TM distance: > 3 FB   Neck ROM: full   Mouth opening: > 3 cm    Respiratory Devices and  Support         Dental       (+) Completely normal teeth      Cardiovascular   cardiovascular exam normal          Pulmonary   pulmonary exam normal                OUTSIDE LABS:  CBC:   Lab Results   Component Value Date    WBC 14.4 (H) 07/08/2023    WBC 15.6 (H) 07/07/2023    HGB 12.7 07/08/2023    HGB 13.6 07/07/2023    HCT 36.4 07/08/2023    HCT 38.8 07/07/2023     07/08/2023     07/07/2023     BMP:   Lab Results   Component Value Date     10/01/2021    POTASSIUM 3.8 10/01/2021    CHLORIDE 106 10/01/2021    CO2 27 10/01/2021    BUN 12 10/01/2021    CR 0.61 07/08/2023    CR 0.57 07/07/2023    GLC 83 05/24/2022    GLC 85 10/01/2021     COAGS:   Lab Results   Component Value Date    PTT 26 07/06/2023    INR 0.89 07/06/2023    FIBR 394 07/06/2023     POC:   Lab Results   Component Value Date    HCG Negative 10/01/2021     HEPATIC:   Lab Results   Component Value Date    ALBUMIN 3.6 10/01/2021    PROTTOTAL 7.1 10/01/2021    ALT 47 07/08/2023    AST 30 07/08/2023    ALKPHOS 51 10/01/2021    BILITOTAL 0.3 10/01/2021     OTHER:   Lab Results   Component Value Date    JAYDA 9.2 10/01/2021    LIPASE 116 10/01/2021    AMYLASE 45 10/01/2021    TSH 1.42 05/24/2022       Anesthesia Plan    ASA Status:  3   NPO Status:  ELEVATED Aspiration Risk/Unknown    Anesthesia Type: Spinal.              Consents    Anesthesia Plan(s) and associated risks, benefits, and realistic alternatives discussed. Questions answered and patient/representative(s) expressed understanding.    - Discussed:     - Discussed with:  Patient, Spouse         Postoperative Care    Pain management: Multi-modal analgesia, IV analgesics, Peripheral nerve block (Single Shot), Oral pain medications.   PONV prophylaxis: Ondansetron (or other 5HT-3)     Comments:           H&P reviewed: Unable to attach VIRTUAL H&P to encounter due to EHR limitations. Appropriate H&P reviewed. The physical exam performed by anesthesia during this surgical encounter  serves as the physical portion of that virtual H&P.  Any significant changes noted within this preop evaluation.          Garrett Mcbride MD

## 2023-07-08 NOTE — PLAN OF CARE
Octavia doing well today. Her headache is much better today and she is feeling a lot better now that she's off magnesium. Denies spots in her eyes and RUQ pain. Baby is moving as much as normal. [place] reveals no contractions, she is not feeling contractions. She has had elevated blood pressures today (see flowsheet) but nothing treatable or severe.  section and blood product consent forms reviewed and signed with Dr. Emma Parham.  section scheduled for tomorrow,  at 0900. Patient and family in agreement with plan. Will continue to closely monitor.

## 2023-07-08 NOTE — PROGRESS NOTES
Antepartum Progress Note      Subjective: Patient is feeling better today now that magnesium has been turned off. Had a headache last night that has now resolved. No vision changes. No chest pain or SOB. No RUQ pain. Feels like swelling is improving      Objective:  Patient Vitals for the past 24 hrs:   BP Temp Temp src Resp SpO2 Weight   07/08/23 0901 (!) 142/90 98.5  F (36.9  C) Oral 18 -- --   07/08/23 0607 -- -- -- -- -- 73.6 kg (162 lb 3.2 oz)   07/08/23 0500 (!) 142/100 98.4  F (36.9  C) Oral 17 -- --   07/08/23 0100 136/86 98.2  F (36.8  C) Oral 18 -- --   07/07/23 2105 (!) 145/101 -- -- -- -- --   07/07/23 2050 (!) 152/102 -- -- -- -- --   07/07/23 2035 (!) 149/97 -- -- -- -- --   07/07/23 2013 (!) 154/96 -- -- -- -- --   07/07/23 1955 (!) 165/100 98.4  F (36.9  C) Oral 20 -- --   07/07/23 1835 (!) 153/99 -- -- 18 100 % --   07/07/23 1830 -- -- -- -- 100 % --   07/07/23 1825 -- -- -- -- 100 % --   07/07/23 1820 -- -- -- -- 100 % --   07/07/23 1815 -- -- -- -- 100 % --   07/07/23 1810 -- -- -- -- 100 % --   07/07/23 1805 -- -- -- -- 100 % --   07/07/23 1800 -- -- -- -- 100 % --   07/07/23 1717 (!) 155/101 -- -- 18 100 % --   07/07/23 1602 (!) 143/97 -- -- 18 100 % --   07/07/23 1456 (!) 155/94 -- -- 18 -- --   ]    I/O last 3 completed shifts:  In: 2440 [P.O.:2440]  Out: 6450 [Urine:6450]    Gen: Resting comfortably in bed, NAD  CV: well perfused  Resp: breathing comfortably on room air  Abd: Gravid, non-distended  Ext: trace LE edema    FHT: , moderate variability, + accels, no decels  [place]: no contractions    Labs:   Latest Reference Range & Units 07/06/23 18:06 07/07/23 06:45 07/07/23 14:58 07/08/23 06:42   Creatinine 0.51 - 0.95 mg/dL 0.56 0.60 0.57 0.61   GFR Estimate >60 mL/min/1.73m2 >90 >90 >90 >90   ALT 0 - 50 U/L 26 71 (H) 62 (H) 47   AST 0 - 45 U/L 28 81 (H) 55 (H) 30   (H): Data is abnormally high   Latest Reference Range & Units 07/07/23 06:45 07/07/23 14:58 07/08/23 06:42   WBC 4.0 - 11.0  10e3/uL 14.1 (H) 15.6 (H) 14.4 (H)   Hemoglobin 11.7 - 15.7 g/dL 13.5 13.6 12.7   Hematocrit 35.0 - 47.0 % 38.7 38.8 36.4   Platelet Count 150 - 450 10e3/uL 194 202 171   RBC Count 3.80 - 5.20 10e6/uL 4.09 4.12 3.76 (L)   MCV 78 - 100 fL 95 94 97   MCH 26.5 - 33.0 pg 33.0 33.0 33.8 (H)   MCHC 31.5 - 36.5 g/dL 34.9 35.1 34.9   RDW 10.0 - 15.0 % 12.4 12.5 12.7   (H): Data is abnormally high  (L): Data is abnormally low      Assessment: Muna Tapia is a 38 year old  @ 33w2d by LMP c/w 6w5d US admitted for preeclampsia with severe features based on sustained severe range BPs. Pregnancy is notable for h/o STAT CS at 32 weeks in the setting of placental abruption, with infant with severe HIE and recent loss of that child. Also notable for bilobed placenta with velamentous cord insertion.      Preeclampsia with severe features  Patient is feeling well this morning, and HELLP labs this AM improved with normal LFTs. Discussed findings with patient. Discussed with patient options of moving forward with CS tomorrow at 0900 vs. Expectant management to 34w0d.  Given history in prior pregnancy and delivery would favor delivery tomorrow.  Patient agrees with this plan and her main goal is to avoid any urgent or emergent scenario for delivery.    - Repeat HELLP with T&S at 2000 tonight  - Serial BP monitoring   - Continue Nifedipine 60mg XL  - Adequate urine output   - s/p CS consent, will plan for CS at 0900 on   - NPO at midnight for CS tomorrow  - Will restart magnesium in AM prior to CS   PNC  - Rh positive, Rubella immune  - GCT elevated, 3h GTT wnl  - GBS unknown - swab ordered on admission, pending   - Other prenatal labs wnl  - Imagin/12 Magy EFW 29%, AC 44%, placenta left lateral and bilobed with velamentous cord insertion  - Declined Tdap  - Pap last 22 NILM HPV neg                   FWB  - Appropriate for gestational age, reactive and reassuring  - Continuous fetal monitoring  - BMZ for fetal  lung maturity, s/p 2 dose, will be through betamethasone window tonight  - s/p NICU consult   - Intrauterine resuscitative measures PRN    Patient seen and discussed with Dr. Luz Marina Parham.    Tea Ward MD  OB/GYN Resident, PGY-3      Physician Attestation   I saw this patient with the resident and agree with the resident/fellow's findings and plan of care as documented in the note.      Key findings: Had extensive discussion with patient regarding  section.  Reviewed pre-op preparations,  section expectations, post op recovery and activity restrictions.  Also discussed patient's wishes during  section.  She would like to have her phone to listen to music, would appreciate explanations and updates on what is going on during the surgery.  She feels this will help with her anxieties.  She would like delayed cord clamping and clear drape if safe to do so.  Also would like photos of baby prior to baby going to NICU.  Reviewed indications, alternatives, benefits, and risks including bleeding, infection, injury to surrounding organs such as the bowel, bladder, blood vessels, ureters, nerves, and baby.  Questions addressed.  Surgical consent and blood transfusion consents signed.  Discussed plan with anesthesia team who is ok placing on schedule for tomorrow 23 at 0900.  NPO after midnight with clears ok until 2 hours prior to surgery.  Pre op shower with surgical soap tonight and tomorrow morning.  Plan for 2000 HELLP labs and T&S.  Discussed post op pain control and patient does desire TAP block.  Discussed with patient plan to leave [place] in and continue IV magnesium post partum for 24 total hours. Decision about [place] removal can be made over time with patient input.  Patient is s/p NICU consult and states she has no further questions for them at this time.    Luz Marina Parham MD  Date of Service (when I saw the patient): 23

## 2023-07-08 NOTE — PLAN OF CARE
"  Pre-Eclampsia Shift Note  Data: Blood pressures within parameters, not treatable overnight. One nonsustained severe range BP at 1955.   Vitals:    07/07/23 2105 07/08/23 0100 07/08/23 0500 07/08/23 0607   BP: (!) 145/101 136/86 (!) 142/100    BP Location:  Left arm Left arm    Patient Position:  Supine Semi-Massey's    Cuff Size:  Adult Regular Adult Regular    Pulse:       Resp:  18 17    Temp:  98.2  F (36.8  C) 98.4  F (36.9  C)    TempSrc:  Oral Oral    SpO2:       Weight:    73.6 kg (162 lb 3.2 oz)   .   Vitals:    07/08/23 0607   Weight: 73.6 kg (162 lb 3.2 oz)   Adequate urine output. Signs and symptoms of pre-eclampsia present and include: a mild, dull, generalized headache. Currently, patient states that her headache feels better after sleeping overnight and declines tylenol. She states that this headache \"comes and goes\" and isn't continual. Denies visual changes or epigastric pain. Denies LOF. Patient had a scant amount of brown discharge overnight, this writer visualized less than 2 cm of light brown discharge on pad    Fetal assessment Reactive.  Repeat pre-eclampsia labs drawn this morning at 0645, results pending. Denies feeling contractions, none noted by TOCO.    Interventions: Monitor blood pressures and indicators of pre-eclampsia every 4 hours. Continue uterine/fetal assessment 3 times daily or as patient requests monitoring. Encourage active range of motion and frequent position changes.    Plan: Observe for and notify care provider of indicators of worsening pre-eclampsia or signs of fetal/maternal compromise.     Lizy Morse RN on 7/8/2023 at 7:03 AM      "

## 2023-07-09 ENCOUNTER — ANESTHESIA (OUTPATIENT)
Dept: OBGYN | Facility: CLINIC | Age: 38
End: 2023-07-09
Payer: COMMERCIAL

## 2023-07-09 ENCOUNTER — ANCILLARY PROCEDURE (OUTPATIENT)
Dept: ULTRASOUND IMAGING | Facility: CLINIC | Age: 38
End: 2023-07-09
Payer: COMMERCIAL

## 2023-07-09 LAB
ALBUMIN SERPL BCG-MCNC: 3.3 G/DL (ref 3.5–5.2)
ALP SERPL-CCNC: 116 U/L (ref 35–104)
ALT SERPL W P-5'-P-CCNC: 39 U/L (ref 0–50)
AST SERPL W P-5'-P-CCNC: 21 U/L (ref 0–45)
BILIRUB DIRECT SERPL-MCNC: <0.2 MG/DL (ref 0–0.3)
BILIRUB SERPL-MCNC: 0.2 MG/DL
CREAT SERPL-MCNC: 0.63 MG/DL (ref 0.51–0.95)
ERYTHROCYTE [DISTWIDTH] IN BLOOD BY AUTOMATED COUNT: 12.7 % (ref 10–15)
GFR SERPL CREATININE-BSD FRML MDRD: >90 ML/MIN/1.73M2
GP B STREP DNA SPEC QL NAA+PROBE: NEGATIVE
HCT VFR BLD AUTO: 36.2 % (ref 35–47)
HGB BLD-MCNC: 12.2 G/DL (ref 11.7–15.7)
MAGNESIUM SERPL-MCNC: 4.4 MG/DL (ref 1.7–2.3)
MCH RBC QN AUTO: 32.8 PG (ref 26.5–33)
MCHC RBC AUTO-ENTMCNC: 33.7 G/DL (ref 31.5–36.5)
MCV RBC AUTO: 97 FL (ref 78–100)
PLATELET # BLD AUTO: 210 10E3/UL (ref 150–450)
PROT SERPL-MCNC: 5.8 G/DL (ref 6.4–8.3)
RBC # BLD AUTO: 3.72 10E6/UL (ref 3.8–5.2)
WBC # BLD AUTO: 10.8 10E3/UL (ref 4–11)

## 2023-07-09 PROCEDURE — 250N000011 HC RX IP 250 OP 636: Mod: JZ

## 2023-07-09 PROCEDURE — 258N000003 HC RX IP 258 OP 636

## 2023-07-09 PROCEDURE — 250N000011 HC RX IP 250 OP 636: Performed by: OBSTETRICS & GYNECOLOGY

## 2023-07-09 PROCEDURE — 59510 CESAREAN DELIVERY: CPT | Mod: GC | Performed by: OBSTETRICS & GYNECOLOGY

## 2023-07-09 PROCEDURE — 710N000009 HC RECOVERY PHASE 1, LEVEL 1, PER MIN: Performed by: OBSTETRICS & GYNECOLOGY

## 2023-07-09 PROCEDURE — 36415 COLL VENOUS BLD VENIPUNCTURE: CPT

## 2023-07-09 PROCEDURE — 272N000001 HC OR GENERAL SUPPLY STERILE: Performed by: OBSTETRICS & GYNECOLOGY

## 2023-07-09 PROCEDURE — 88307 TISSUE EXAM BY PATHOLOGIST: CPT | Mod: TC

## 2023-07-09 PROCEDURE — 250N000013 HC RX MED GY IP 250 OP 250 PS 637

## 2023-07-09 PROCEDURE — 83735 ASSAY OF MAGNESIUM: CPT

## 2023-07-09 PROCEDURE — 250N000011 HC RX IP 250 OP 636: Performed by: STUDENT IN AN ORGANIZED HEALTH CARE EDUCATION/TRAINING PROGRAM

## 2023-07-09 PROCEDURE — 250N000009 HC RX 250: Performed by: STUDENT IN AN ORGANIZED HEALTH CARE EDUCATION/TRAINING PROGRAM

## 2023-07-09 PROCEDURE — 250N000013 HC RX MED GY IP 250 OP 250 PS 637: Performed by: OBSTETRICS & GYNECOLOGY

## 2023-07-09 PROCEDURE — 271N000001 HC OR GENERAL SUPPLY NON-STERILE: Performed by: OBSTETRICS & GYNECOLOGY

## 2023-07-09 PROCEDURE — 370N000017 HC ANESTHESIA TECHNICAL FEE, PER MIN: Performed by: OBSTETRICS & GYNECOLOGY

## 2023-07-09 PROCEDURE — 250N000011 HC RX IP 250 OP 636: Mod: JZ | Performed by: STUDENT IN AN ORGANIZED HEALTH CARE EDUCATION/TRAINING PROGRAM

## 2023-07-09 PROCEDURE — 80076 HEPATIC FUNCTION PANEL: CPT | Performed by: STUDENT IN AN ORGANIZED HEALTH CARE EDUCATION/TRAINING PROGRAM

## 2023-07-09 PROCEDURE — 999N000141 HC STATISTIC PRE-PROCEDURE NURSING ASSESSMENT: Performed by: OBSTETRICS & GYNECOLOGY

## 2023-07-09 PROCEDURE — 258N000003 HC RX IP 258 OP 636: Performed by: STUDENT IN AN ORGANIZED HEALTH CARE EDUCATION/TRAINING PROGRAM

## 2023-07-09 PROCEDURE — 360N000076 HC SURGERY LEVEL 3, PER MIN: Performed by: OBSTETRICS & GYNECOLOGY

## 2023-07-09 PROCEDURE — 82565 ASSAY OF CREATININE: CPT

## 2023-07-09 PROCEDURE — C9290 INJ, BUPIVACAINE LIPOSOME: HCPCS | Performed by: STUDENT IN AN ORGANIZED HEALTH CARE EDUCATION/TRAINING PROGRAM

## 2023-07-09 PROCEDURE — 120N000002 HC R&B MED SURG/OB UMMC

## 2023-07-09 PROCEDURE — 88307 TISSUE EXAM BY PATHOLOGIST: CPT | Mod: 26 | Performed by: PATHOLOGY

## 2023-07-09 PROCEDURE — 85027 COMPLETE CBC AUTOMATED: CPT | Performed by: STUDENT IN AN ORGANIZED HEALTH CARE EDUCATION/TRAINING PROGRAM

## 2023-07-09 RX ORDER — PROCHLORPERAZINE MALEATE 10 MG
10 TABLET ORAL EVERY 6 HOURS PRN
Status: DISCONTINUED | OUTPATIENT
Start: 2023-07-09 | End: 2023-07-13 | Stop reason: HOSPADM

## 2023-07-09 RX ORDER — NALOXONE HYDROCHLORIDE 0.4 MG/ML
0.2 INJECTION, SOLUTION INTRAMUSCULAR; INTRAVENOUS; SUBCUTANEOUS
Status: DISCONTINUED | OUTPATIENT
Start: 2023-07-09 | End: 2023-07-13 | Stop reason: HOSPADM

## 2023-07-09 RX ORDER — SODIUM CHLORIDE, SODIUM LACTATE, POTASSIUM CHLORIDE, CALCIUM CHLORIDE 600; 310; 30; 20 MG/100ML; MG/100ML; MG/100ML; MG/100ML
INJECTION, SOLUTION INTRAVENOUS CONTINUOUS PRN
Status: DISCONTINUED | OUTPATIENT
Start: 2023-07-09 | End: 2023-07-09

## 2023-07-09 RX ORDER — EPINEPHRINE 1 MG/ML
INJECTION, SOLUTION, CONCENTRATE INTRAVENOUS
Status: COMPLETED | OUTPATIENT
Start: 2023-07-09 | End: 2023-07-09

## 2023-07-09 RX ORDER — AMOXICILLIN 250 MG
1 CAPSULE ORAL 2 TIMES DAILY
Status: DISCONTINUED | OUTPATIENT
Start: 2023-07-09 | End: 2023-07-13 | Stop reason: HOSPADM

## 2023-07-09 RX ORDER — AMOXICILLIN 250 MG
2 CAPSULE ORAL 2 TIMES DAILY
Status: DISCONTINUED | OUTPATIENT
Start: 2023-07-09 | End: 2023-07-13 | Stop reason: HOSPADM

## 2023-07-09 RX ORDER — METOCLOPRAMIDE HYDROCHLORIDE 5 MG/ML
10 INJECTION INTRAMUSCULAR; INTRAVENOUS EVERY 6 HOURS PRN
Status: DISCONTINUED | OUTPATIENT
Start: 2023-07-09 | End: 2023-07-13 | Stop reason: HOSPADM

## 2023-07-09 RX ORDER — MORPHINE SULFATE 1 MG/ML
INJECTION, SOLUTION EPIDURAL; INTRATHECAL; INTRAVENOUS
Status: COMPLETED | OUTPATIENT
Start: 2023-07-09 | End: 2023-07-09

## 2023-07-09 RX ORDER — NIFEDIPINE 30 MG/1
30 TABLET, EXTENDED RELEASE ORAL ONCE
Status: COMPLETED | OUTPATIENT
Start: 2023-07-09 | End: 2023-07-09

## 2023-07-09 RX ORDER — METHYLERGONOVINE MALEATE 0.2 MG/ML
200 INJECTION INTRAVENOUS
Status: DISCONTINUED | OUTPATIENT
Start: 2023-07-09 | End: 2023-07-13 | Stop reason: HOSPADM

## 2023-07-09 RX ORDER — TRANEXAMIC ACID 10 MG/ML
1 INJECTION, SOLUTION INTRAVENOUS EVERY 30 MIN PRN
Status: DISCONTINUED | OUTPATIENT
Start: 2023-07-09 | End: 2023-07-13 | Stop reason: HOSPADM

## 2023-07-09 RX ORDER — BUPIVACAINE HYDROCHLORIDE 7.5 MG/ML
INJECTION, SOLUTION INTRASPINAL
Status: COMPLETED | OUTPATIENT
Start: 2023-07-09 | End: 2023-07-09

## 2023-07-09 RX ORDER — MODIFIED LANOLIN
OINTMENT (GRAM) TOPICAL
Status: DISCONTINUED | OUTPATIENT
Start: 2023-07-09 | End: 2023-07-13 | Stop reason: HOSPADM

## 2023-07-09 RX ORDER — KETOROLAC TROMETHAMINE 30 MG/ML
INJECTION, SOLUTION INTRAMUSCULAR; INTRAVENOUS PRN
Status: DISCONTINUED | OUTPATIENT
Start: 2023-07-09 | End: 2023-07-09

## 2023-07-09 RX ORDER — KETOROLAC TROMETHAMINE 30 MG/ML
30 INJECTION, SOLUTION INTRAMUSCULAR; INTRAVENOUS EVERY 6 HOURS
Status: COMPLETED | OUTPATIENT
Start: 2023-07-09 | End: 2023-07-10

## 2023-07-09 RX ORDER — OXYTOCIN 10 [USP'U]/ML
10 INJECTION, SOLUTION INTRAMUSCULAR; INTRAVENOUS
Status: DISCONTINUED | OUTPATIENT
Start: 2023-07-09 | End: 2023-07-13 | Stop reason: HOSPADM

## 2023-07-09 RX ORDER — NALBUPHINE HYDROCHLORIDE 10 MG/ML
2.5-5 INJECTION, SOLUTION INTRAMUSCULAR; INTRAVENOUS; SUBCUTANEOUS EVERY 6 HOURS PRN
Status: DISCONTINUED | OUTPATIENT
Start: 2023-07-09 | End: 2023-07-13 | Stop reason: HOSPADM

## 2023-07-09 RX ORDER — FENTANYL CITRATE 50 UG/ML
INJECTION, SOLUTION INTRAMUSCULAR; INTRAVENOUS
Status: COMPLETED | OUTPATIENT
Start: 2023-07-09 | End: 2023-07-09

## 2023-07-09 RX ORDER — SIMETHICONE 80 MG
80 TABLET,CHEWABLE ORAL 4 TIMES DAILY PRN
Status: DISCONTINUED | OUTPATIENT
Start: 2023-07-09 | End: 2023-07-13 | Stop reason: HOSPADM

## 2023-07-09 RX ORDER — SODIUM CHLORIDE, SODIUM LACTATE, POTASSIUM CHLORIDE, CALCIUM CHLORIDE 600; 310; 30; 20 MG/100ML; MG/100ML; MG/100ML; MG/100ML
INJECTION, SOLUTION INTRAVENOUS
Status: COMPLETED
Start: 2023-07-09 | End: 2023-07-09

## 2023-07-09 RX ORDER — PROCHLORPERAZINE 25 MG
25 SUPPOSITORY, RECTAL RECTAL EVERY 12 HOURS PRN
Status: DISCONTINUED | OUTPATIENT
Start: 2023-07-09 | End: 2023-07-13 | Stop reason: HOSPADM

## 2023-07-09 RX ORDER — HYDROCORTISONE 25 MG/G
CREAM TOPICAL 3 TIMES DAILY PRN
Status: DISCONTINUED | OUTPATIENT
Start: 2023-07-09 | End: 2023-07-13 | Stop reason: HOSPADM

## 2023-07-09 RX ORDER — MAGNESIUM SULFATE IN WATER 40 MG/ML
2 INJECTION, SOLUTION INTRAVENOUS CONTINUOUS
Status: DISPENSED | OUTPATIENT
Start: 2023-07-09 | End: 2023-07-10

## 2023-07-09 RX ORDER — LABETALOL 200 MG/1
200 TABLET, FILM COATED ORAL EVERY 12 HOURS SCHEDULED
Status: DISCONTINUED | OUTPATIENT
Start: 2023-07-09 | End: 2023-07-10

## 2023-07-09 RX ORDER — ACETAMINOPHEN 325 MG/1
975 TABLET ORAL EVERY 6 HOURS
Status: DISCONTINUED | OUTPATIENT
Start: 2023-07-09 | End: 2023-07-13 | Stop reason: HOSPADM

## 2023-07-09 RX ORDER — MISOPROSTOL 200 UG/1
800 TABLET ORAL
Status: DISCONTINUED | OUTPATIENT
Start: 2023-07-09 | End: 2023-07-13 | Stop reason: HOSPADM

## 2023-07-09 RX ORDER — CARBOPROST TROMETHAMINE 250 UG/ML
250 INJECTION, SOLUTION INTRAMUSCULAR
Status: DISCONTINUED | OUTPATIENT
Start: 2023-07-09 | End: 2023-07-13 | Stop reason: HOSPADM

## 2023-07-09 RX ORDER — NALOXONE HYDROCHLORIDE 0.4 MG/ML
0.4 INJECTION, SOLUTION INTRAMUSCULAR; INTRAVENOUS; SUBCUTANEOUS
Status: DISCONTINUED | OUTPATIENT
Start: 2023-07-09 | End: 2023-07-13 | Stop reason: HOSPADM

## 2023-07-09 RX ORDER — DIPHENHYDRAMINE HCL 25 MG
25 CAPSULE ORAL EVERY 6 HOURS PRN
Status: DISCONTINUED | OUTPATIENT
Start: 2023-07-09 | End: 2023-07-13 | Stop reason: HOSPADM

## 2023-07-09 RX ORDER — METOCLOPRAMIDE 10 MG/1
10 TABLET ORAL EVERY 6 HOURS PRN
Status: DISCONTINUED | OUTPATIENT
Start: 2023-07-09 | End: 2023-07-13 | Stop reason: HOSPADM

## 2023-07-09 RX ORDER — DEXTROSE, SODIUM CHLORIDE, SODIUM LACTATE, POTASSIUM CHLORIDE, AND CALCIUM CHLORIDE 5; .6; .31; .03; .02 G/100ML; G/100ML; G/100ML; G/100ML; G/100ML
INJECTION, SOLUTION INTRAVENOUS CONTINUOUS
Status: DISCONTINUED | OUTPATIENT
Start: 2023-07-09 | End: 2023-07-13 | Stop reason: HOSPADM

## 2023-07-09 RX ORDER — MISOPROSTOL 200 UG/1
400 TABLET ORAL
Status: DISCONTINUED | OUTPATIENT
Start: 2023-07-09 | End: 2023-07-13 | Stop reason: HOSPADM

## 2023-07-09 RX ORDER — DIPHENHYDRAMINE HYDROCHLORIDE 50 MG/ML
25 INJECTION INTRAMUSCULAR; INTRAVENOUS EVERY 6 HOURS PRN
Status: DISCONTINUED | OUTPATIENT
Start: 2023-07-09 | End: 2023-07-13 | Stop reason: HOSPADM

## 2023-07-09 RX ORDER — ONDANSETRON 2 MG/ML
4 INJECTION INTRAMUSCULAR; INTRAVENOUS EVERY 6 HOURS PRN
Status: DISCONTINUED | OUTPATIENT
Start: 2023-07-09 | End: 2023-07-13 | Stop reason: HOSPADM

## 2023-07-09 RX ORDER — MAGNESIUM SULFATE HEPTAHYDRATE 40 MG/ML
4 INJECTION, SOLUTION INTRAVENOUS ONCE
Status: COMPLETED | OUTPATIENT
Start: 2023-07-09 | End: 2023-07-09

## 2023-07-09 RX ORDER — BISACODYL 10 MG
10 SUPPOSITORY, RECTAL RECTAL DAILY PRN
Status: DISCONTINUED | OUTPATIENT
Start: 2023-07-11 | End: 2023-07-13 | Stop reason: HOSPADM

## 2023-07-09 RX ORDER — OXYTOCIN/0.9 % SODIUM CHLORIDE 30/500 ML
340 PLASTIC BAG, INJECTION (ML) INTRAVENOUS CONTINUOUS PRN
Status: DISCONTINUED | OUTPATIENT
Start: 2023-07-09 | End: 2023-07-13 | Stop reason: HOSPADM

## 2023-07-09 RX ORDER — OXYCODONE HYDROCHLORIDE 5 MG/1
5 TABLET ORAL EVERY 4 HOURS PRN
Status: DISCONTINUED | OUTPATIENT
Start: 2023-07-09 | End: 2023-07-13 | Stop reason: HOSPADM

## 2023-07-09 RX ORDER — ONDANSETRON 2 MG/ML
INJECTION INTRAMUSCULAR; INTRAVENOUS PRN
Status: DISCONTINUED | OUTPATIENT
Start: 2023-07-09 | End: 2023-07-09

## 2023-07-09 RX ORDER — LIDOCAINE 40 MG/G
CREAM TOPICAL
Status: DISCONTINUED | OUTPATIENT
Start: 2023-07-09 | End: 2023-07-13 | Stop reason: HOSPADM

## 2023-07-09 RX ORDER — OXYTOCIN/0.9 % SODIUM CHLORIDE 30/500 ML
PLASTIC BAG, INJECTION (ML) INTRAVENOUS CONTINUOUS PRN
Status: DISCONTINUED | OUTPATIENT
Start: 2023-07-09 | End: 2023-07-09

## 2023-07-09 RX ORDER — ONDANSETRON 4 MG/1
4 TABLET, ORALLY DISINTEGRATING ORAL EVERY 6 HOURS PRN
Status: DISCONTINUED | OUTPATIENT
Start: 2023-07-09 | End: 2023-07-13 | Stop reason: HOSPADM

## 2023-07-09 RX ORDER — IBUPROFEN 800 MG/1
800 TABLET, FILM COATED ORAL EVERY 6 HOURS
Status: DISCONTINUED | OUTPATIENT
Start: 2023-07-10 | End: 2023-07-13 | Stop reason: HOSPADM

## 2023-07-09 RX ORDER — NIFEDIPINE 30 MG/1
90 TABLET, EXTENDED RELEASE ORAL EVERY 24 HOURS
Status: DISCONTINUED | OUTPATIENT
Start: 2023-07-10 | End: 2023-07-13 | Stop reason: HOSPADM

## 2023-07-09 RX ORDER — BUPIVACAINE HYDROCHLORIDE 2.5 MG/ML
INJECTION, SOLUTION EPIDURAL; INFILTRATION; INTRACAUDAL
Status: COMPLETED | OUTPATIENT
Start: 2023-07-09 | End: 2023-07-09

## 2023-07-09 RX ADMIN — Medication 2 G: at 09:15

## 2023-07-09 RX ADMIN — MORPHINE SULFATE 0.15 MG: 1 INJECTION EPIDURAL; INTRATHECAL; INTRAVENOUS at 09:13

## 2023-07-09 RX ADMIN — SODIUM CHLORIDE, POTASSIUM CHLORIDE, SODIUM LACTATE AND CALCIUM CHLORIDE 75 ML/HR: 600; 310; 30; 20 INJECTION, SOLUTION INTRAVENOUS at 16:30

## 2023-07-09 RX ADMIN — PHENYLEPHRINE HYDROCHLORIDE 40 MCG/MIN: 10 INJECTION INTRAVENOUS at 09:16

## 2023-07-09 RX ADMIN — SODIUM CHLORIDE, POTASSIUM CHLORIDE, SODIUM LACTATE AND CALCIUM CHLORIDE 50 ML/HR: 600; 310; 30; 20 INJECTION, SOLUTION INTRAVENOUS at 08:12

## 2023-07-09 RX ADMIN — FENTANYL CITRATE 15 MCG: 50 INJECTION, SOLUTION INTRAMUSCULAR; INTRAVENOUS at 09:13

## 2023-07-09 RX ADMIN — ACETAMINOPHEN 975 MG: 325 TABLET, FILM COATED ORAL at 22:59

## 2023-07-09 RX ADMIN — KETOROLAC TROMETHAMINE 30 MG: 30 INJECTION, SOLUTION INTRAMUSCULAR; INTRAVENOUS at 16:22

## 2023-07-09 RX ADMIN — KETOROLAC TROMETHAMINE 30 MG: 30 INJECTION, SOLUTION INTRAMUSCULAR at 10:01

## 2023-07-09 RX ADMIN — SENNOSIDES AND DOCUSATE SODIUM 1 TABLET: 50; 8.6 TABLET ORAL at 19:53

## 2023-07-09 RX ADMIN — SODIUM CITRATE AND CITRIC ACID MONOHYDRATE 30 ML: 500; 334 SOLUTION ORAL at 08:36

## 2023-07-09 RX ADMIN — EPINEPHRINE 0.1 MG: 1 INJECTION, SOLUTION, CONCENTRATE INTRAVENOUS at 09:13

## 2023-07-09 RX ADMIN — ONDANSETRON 4 MG: 2 INJECTION INTRAMUSCULAR; INTRAVENOUS at 15:08

## 2023-07-09 RX ADMIN — BUPIVACAINE HYDROCHLORIDE 20 ML: 2.5 INJECTION, SOLUTION EPIDURAL; INFILTRATION; INTRACAUDAL at 10:25

## 2023-07-09 RX ADMIN — KETOROLAC TROMETHAMINE 30 MG: 30 INJECTION, SOLUTION INTRAMUSCULAR; INTRAVENOUS at 22:59

## 2023-07-09 RX ADMIN — SODIUM CHLORIDE, POTASSIUM CHLORIDE, SODIUM LACTATE AND CALCIUM CHLORIDE: 600; 310; 30; 20 INJECTION, SOLUTION INTRAVENOUS at 09:14

## 2023-07-09 RX ADMIN — NIFEDIPINE 60 MG: 30 TABLET, FILM COATED, EXTENDED RELEASE ORAL at 07:16

## 2023-07-09 RX ADMIN — OXYTOCIN-SODIUM CHLORIDE 0.9% IV SOLN 30 UNIT/500ML 300 ML/HR: 30-0.9/5 SOLUTION at 09:42

## 2023-07-09 RX ADMIN — ONDANSETRON 4 MG: 2 INJECTION INTRAMUSCULAR; INTRAVENOUS at 08:12

## 2023-07-09 RX ADMIN — NIFEDIPINE 30 MG: 30 TABLET, EXTENDED RELEASE ORAL at 16:22

## 2023-07-09 RX ADMIN — LABETALOL HYDROCHLORIDE 20 MG: 5 INJECTION, SOLUTION INTRAVENOUS at 11:09

## 2023-07-09 RX ADMIN — BUPIVACAINE HYDROCHLORIDE IN DEXTROSE 1.6 ML: 7.5 INJECTION, SOLUTION SUBARACHNOID at 09:13

## 2023-07-09 RX ADMIN — ACETAMINOPHEN 975 MG: 325 TABLET ORAL at 08:19

## 2023-07-09 RX ADMIN — METOCLOPRAMIDE HYDROCHLORIDE 10 MG: 5 INJECTION INTRAMUSCULAR; INTRAVENOUS at 11:44

## 2023-07-09 RX ADMIN — ACETAMINOPHEN 975 MG: 325 TABLET, FILM COATED ORAL at 16:22

## 2023-07-09 RX ADMIN — LABETALOL HYDROCHLORIDE 200 MG: 200 TABLET, FILM COATED ORAL at 21:03

## 2023-07-09 RX ADMIN — ONDANSETRON 4 MG: 2 INJECTION INTRAMUSCULAR; INTRAVENOUS at 09:22

## 2023-07-09 RX ADMIN — MAGNESIUM SULFATE HEPTAHYDRATE 2 G/HR: 40 INJECTION, SOLUTION INTRAVENOUS at 08:48

## 2023-07-09 RX ADMIN — BUPIVACAINE 20 ML: 13.3 INJECTION, SUSPENSION, LIPOSOMAL INFILTRATION at 10:25

## 2023-07-09 RX ADMIN — MAGNESIUM SULFATE HEPTAHYDRATE 4 G: 40 INJECTION, SOLUTION INTRAVENOUS at 08:13

## 2023-07-09 ASSESSMENT — ACTIVITIES OF DAILY LIVING (ADL)
ADLS_ACUITY_SCORE: 26
ADLS_ACUITY_SCORE: 18
ADLS_ACUITY_SCORE: 26
ADLS_ACUITY_SCORE: 18
ADLS_ACUITY_SCORE: 26
ADLS_ACUITY_SCORE: 18

## 2023-07-09 NOTE — BRIEF OP NOTE
[facility]    Brief Operative Note    Pre-operative diagnosis: Previous  delivery, antepartum condition or complication [O34.219]  Post-operative diagnosis Same as above, s/p procedure below    Procedure: Procedure(s):   SECTION, WITH possible POSTPARTUM TUBAL LIGATION  Surgeon: Surgeon(s) and Role:     * Tea Carcamo MD - Primary     * Indra Valerio MD - Resident - Assisting  Anesthesia: Spinal   Estimated Blood Loss: 274 mL    Drains: [place]  Specimens: * No specimens in log *  Findings:    1. Single, viable female infant at 0941 hours on 2023. Apgars of 9 and 9 at one and five minutes.  Birth weight: 1840 g.  Fetal presentation: OA. Amniotic fluid: clear.    2. Arterial cord gases not taken.    3. Placenta intact with 3 vessel cord.     4. Small subserosal fibroid about 1 x 2 cm on posterior left uterus. Many subserosal fibroids <0.5 cm scattered on posterior side of the uterus. Otherwise normal appearing uterus, fallopian tubes, ovaries.   5.  No intraabdominal adhesions.  Minimal abdominal wall adhesions.    Complications: None  Implants: * No implants in log *     Indra Valerio MD, MPH  Ob/Gyn Resident, PGY-3  23 10:10 AM

## 2023-07-09 NOTE — ANESTHESIA PROCEDURE NOTES
"Intrathecal injection Procedure Note    Pre-Procedure   Staff -        Anesthesiologist:  Anneliese Addison MD       Resident/Fellow: Micha Cobos MD       Performed By: resident       Location: OR       Pre-Anesthestic Checklist: patient identified, IV checked, risks and benefits discussed, informed consent, monitors and equipment checked, pre-op evaluation, at physician/surgeon's request and post-op pain management  Timeout:       Correct Patient: Yes        Correct Procedure: Yes        Correct Site: Yes        Correct Position: Yes   Procedure Documentation  Procedure: intrathecal injection       Patient Position: sitting       Patient Prep/Sterile Barriers: sterile gloves, mask, patient draped       Skin prep: Chloraprep       Insertion Site: L3-4. (midline approach).       Needle Gauge: 25.        Needle Length (Inches): 3.5        Spinal Needle Type: Pencan       Introducer used       Introducer: 20 G       # of attempts: 1 and  # of redirects:  0    Assessment/Narrative         Paresthesias: No.       Sensory Level: T6       CSF fluid: clear.    Medication(s) Administered   0.75% Hyperbaric Bupivacaine (Intrathecal) - Intrathecal   1.6 mL - 7/9/2023 9:13:00 AM  Morphine PF 1 mg/mL (Intrathecal) - Intrathecal   0.15 mg - 7/9/2023 9:13:00 AM  Fentanyl PF (Intrathecal) - Intrathecal   15 mcg - 7/9/2023 9:13:00 AM  Epinephrine 1000 mcg/mL (Intrathecal) - Intrathecal   0.1 mg - 7/9/2023 9:13:00 AM    FOR [facility] ([facility]) ONLY:   Pain Team Contact information: please page the Pain Team [email or web address]. Search \"Pain\". During daytime hours, please page the attending first. At night please page the resident first.      "

## 2023-07-09 NOTE — PROGRESS NOTES
Antepartum Progress Note      Subjective: Patient is feeling nervous but otherwise okay. Reprots good fetal movement. No contractions. No headache, vision changes, chest pain, SOB, or RUQ pain.      Objective:  Patient Vitals for the past 24 hrs:   BP Temp Temp src Pulse Resp   23 0827 (!) 142/102 -- -- -- --   23 0815 (!) 149/103 -- -- -- --   23 0300 123/76 98.4  F (36.9  C) Oral 86 18   23 2256 (!) 139/90 98.2  F (36.8  C) Oral -- 19   23 2045 (!) 148/97 -- -- -- 21   23 1710 (!) 142/94 -- -- -- 18   23 1319 (!) 143/91 -- -- -- 18   23 0901 (!) 142/90 98.5  F (36.9  C) Oral -- 18     I/O last 3 completed shifts:  In: 3040 [P.O.:3040]  Out: 3150 [Urine:3150]    Gen: Resting comfortably in bed, NAD  CV: well perfused  Resp: breathing comfortably on room air  Abd: Gravid, non-distended  Ext: trace LE edema    FHT: , moderate variability, + accels, no decels  [place]: no contractions    Labs:  - AST 81>55>30>26> 21, ALT 71>62>47>45> 39      Assessment: Muna Tapia is a 38 year old  @ 33w3d by LMP c/w 6w5d US admitted for preeclampsia with severe features based on sustained severe range BPs. Pregnancy is notable for h/o STAT CS at 32 weeks in the setting of placental abruption, with infant with severe HIE and recent loss of that child. Also notable for bilobed placenta with velamentous cord insertion. Plan for delivery this AM via repeat  section. S/p consents. Magnesium infusion restarted in anticipation of delivery.     Preeclampsia with severe features  - Serial BP monitoring   - Continue Nifedipine 60mg XL  - Adequate urine output   - Magnesium load started  - Will move to OR at 0900 for repeat  section    PNC  - Rh positive, Rubella immune  - GCT elevated, 3h GTT wnl  - GBS unknown - swab ordered on admission, pending   - Other prenatal labs wnl  - Imagin/12 Magy EFW 29%, AC 44%, placenta left lateral and bilobed with velamentous  cord insertion, cephalic  - Declined Tdap  - Pap last 22 NILM HPV neg                   FWB  - Appropriate for gestational age, reactive and reassuring  - Continuous fetal monitoring  - BMZ for fetal lung maturity, s/p 2 dose  - s/p NICU consult   - Intrauterine resuscitative measures PRN    Patient seen and discussed with Dr. Carcamo.    Indra Valerio MD MPH  OB/GYN Resident, PGY-3  23 8:42 AM         Physician Attestation   I saw this patient with the resident and agree with the resident/fellow's findings and plan of care as documented in the note.      Key findings: 38 year old  at 33w3d admitted with pre-eclampsia with severe features. She is through her betamethasone window and planning delivery by repeat  section today. Discussed indication for  section. Reviewed risks, benefits, alternatives and recovery of  section. Risks include but are not limited to infection that may need antibiotics to treat, injury to other organs such as but not limited to bladder, ureters, intestines, injury to baby, risk of excessive blood loss, potential need for blood transfusion and potential need for hysterectomy. Patient consents to transfusion of blood products if indicated. Discussed risks of anesthesia and DVT. Verbal and written consent previously obtained.  Plan to continue magnesium sulfate for 24h postpartum for seizure prophylaxis.    Please see A&P for additional details of medical decision making.    I have personally reviewed the following data over the past 24 hrs:    10.8  \   12.2   / 210     N/A N/A N/A /  N/A   N/A N/A 0.63 \       ALT: 39 AST: 21 AP: 116 (H) TBILI: 0.2   ALB: 3.3 (L) TOT PROTEIN: 5.8 (L) LIPASE: N/A         Tea Carcamo MD  Date of Service (when I saw the patient): 23

## 2023-07-09 NOTE — CARE PLAN
Octavia Tapia transferred to OB PACU after an uneventful  section.  section was performed due to Muna having preeclampsia with severe features. TAP block done after surgery. . Viable female infant born at 0941, apgars 9/9. Muna is in stable condition after close monitoring in the OB PACU. Will transfer to NICU then [facility].

## 2023-07-09 NOTE — CARE PLAN
Data: Muna Tapia transferred to 71 via zoom cart at 1240. Baby in NICU due to prematurity.   Action: Receiving unit notified of transfer: Yes. Patient and family notified of room change. Report given to yasmeen Ly RN at 1245. Belongings sent to receiving unit. Accompanied by Registered Nurse. Oriented patient to surroundings. Call light within reach.  Response: Patient tolerated transfer and is stable.

## 2023-07-09 NOTE — PROGRESS NOTES
Magnesium Check Postpartum  S:  Patient reports feeling a little funny with the magnesium running but overall okay. Denies any headache, vision changes, SOB, chest pain at this time.     O:  Patient Vitals for the past 24 hrs:   BP Temp Temp src Pulse Resp SpO2   23 1100 (!) 158/114 -- -- 70 -- 99 %   23 1050 (!) 147/106 -- -- 82 -- 98 %   23 1045 (!) 148/118 -- -- 84 -- 98 %   23 1035 (!) 137/103 -- -- 71 -- 98 %   23 1031 (!) 137/103 -- -- 70 -- 98 %   23 1030 (!) 148/102 97.4  F (36.3  C) Oral 67 -- 98 %   23 0848 (!) 152/96 -- -- -- -- --   23 0827 (!) 142/102 -- -- -- -- --   23 0815 (!) 149/103 -- -- -- -- --   23 0300 123/76 98.4  F (36.9  C) Oral 86 18 --   23 2256 (!) 139/90 98.2  F (36.8  C) Oral -- 19 --   23 2045 (!) 148/97 -- -- -- 21 --   23 1710 (!) 142/94 -- -- -- 18 --   23 1319 (!) 143/91 -- -- -- 18 --       Gen: Resting comfortably, NAD  CV: RRR, no murmurs  Resp: Non-labored breathing, CTAB  Abd: Soft, non-tender, fundus below the umbilicus, bandage in place, clean and dry  Neuro: 1+ bicep reflexes. Neg clonus.    HGB  Recent Labs   Lab 23  0732 23  0642 23  1458   HGB 12.2 13.2 12.7 13.6       A/P:  Kaitin I K Sprague is a 38 year old  at 33w3d who is POD#0 s/p RLTCS for preeclampsia with severe features after 4 day antepartum stay. Doing well, no signs of Mg toxicity. Due to SSR blood pressures, IV labetalol 20 being administered currently. Will continue to titrate long acting antiHTN for better control, as well.    # Preeclampsia w/ severe features:  - Continue to monitor BPs and treat sustained severe range per protocol  - Mg 4g>2g. No s/s of mag toxicity. No symptoms  - Plan to discontinue magnesium at 24 hours postpartum  - Strict I&O  - Continue q4h clinical mag checks     # Postpartum Care  - Continue routine postpartum cares    Indra Valerio MD, MPH  Ob/Gyn  Resident, PGY-3  07/09/23 11:12 AM

## 2023-07-09 NOTE — OP NOTE
[facility]   OPERATIVE NOTE:  SECTION     Surgery Date:  2023  Surgeon(s): Tea Carcamo MD  Assistants:  Indra Valerio MD, MPH, PGY3    Preoperative Diagnoses:  -  at 33w3d  - History of  section x1  - Preeclampsia w/ severe features  - History of placental abruption in previous pregnancy  - Bilobed placenta  - Velamentous cord insertion    Postoperative diagnoses:  - Same as above, now delivered    Procedure performed:  Repeat low segment transverse  section via Pfannenstiel skin incision with double layer uterine closure    Anesthesia:  Spinal with duramorph  Quant Blood Loss (mL): 274 mL  Fluid replacement: 800 mL crystalloid.   Specimens: Cord blood, placenta  Complications: None      Operative findings:   1. Single, viable female infant at 0941 hours on 2023. Apgars of 9 and 9 at one and five minutes.  Birth weight: 1840 g.  Fetal presentation: OA. Amniotic fluid: clear.    2. Arterial cord gases not taken.    3. Placenta intact with 3 vessel cord.     4. Small subserosal fibroid about 1 x 2 cm on posterior left uterus. Many subserosal fibroids <0.5 cm scattered on posterior side of the uterus. Otherwise normal appearing uterus, fallopian tubes, ovaries.   5.  No intraabdominal adhesions.  Minimal abdominal wall adhesions.    Indication: Muna Tapia is a 38 year old, , who was admitted at 33w0d by LMP c/w 6w1d US for preeclampsia with severe features by blood pressure criteria. She was started on magnesium infusion and antihypertensives for management. Liver enzymes were mildly elevated during her admission but less than twice the upper limit of normal. Management options including expectant management until 34w0d vs expediting delivery when she was through steroid window were discussed with the patient.  Given history in prior pregnancy of placental abruption and poor fetal outcome, she desired moving toward sooner delivery  as her main goal is to avoid any urgent or emergent scenario for delivery. The risks, benefits, and alternatives of  delivery were explained and the patient agreed to proceed.     Procedure details:  After obtaining informed consent, the patient was taken to the operating room. She received 2g Ancef prior to the skin incision. She was placed in the dorsal supine position with a leftward tilt and prepped and draped in the usual sterile fashion.     Following test of adequate spinal anesthesia, the abdomen was entered through a transverse skin incision through her previous scar. The skin incision was made sharply and carried through the subcutaneous tissue to the fascia.  Fascia was incised in the midline and extended laterally with the Morfin scissors.  The superior margin of the fascial incision was grasped with Kocher clamps and dissected from the underlying muscle with sharp and blunt dissecton, which was then repeated at the lower margin of the fascial incision.  The muscle was  in the midline.      The peritoneum was entered bluntly and the opening extended by digital dissection with care to avoid the bladder. A bladder blade was placed. The vesicouterine peritoneum was entered sharply with Metzenbaum scissors and incision extended laterally. The bladder flap was created digitally and the bladder blade replaced. The lower segment of the uterus was opened sharply in a transverse fashion and extended with digital pressure. The infant's head was noted to be in the OA position. It was elevated to the level of the hysterotomy and was delivered atraumatically, shoulders delivered easily thereafter. The cord was doubly clamped after 60 seconds and cut and the infant was handed off to the waiting NICU team. A segment of the cord was cut and set aside for cord gases if needed.     The placenta was expressed.  The uterus was exteriorized from the abdomen and cleared of all clots and debris.  The uterus was  massaged and was noted to be boggy.  Pitocin was given through the running IV.  The hysterotomy was repaired with 0-vicryl suture in a running locked fashion. A 2nd layer of 0-vicryl was used to imbricate the incision and good hemostasis was achieved. With vigorous massage as well as administration of pitocin, good uterine tone was achieved. The bladder flap was inspected and found to be hemostatic.  The posterior cul-de-sac was cleared of all clot and debris and the uterus was returned to the abdomen.      The bilateral pericolic gutters were cleared of all clot and debris.  The hysterotomy was again inspected and found to be hemostatic.  The abdominal wall was examined and also found to be hemostatic.  The fascia was closed with a running suture of 0-Vicryl.  Subcutaneous tissue was irrigated. Areas that were not hemostatic were controlled with cautery. The subcutaneous tissue was less than 3cm in depth and did not require reapproximation. The skin was closed with 4-0 monocryl. Steristrips and a sterile dressing were applied. The patient tolerated the procedure well and was taken to the recovery room in stable condition. All sponge, needle and instrument counts were correct x2.    Dr. Carcamo was present for the entire procedure.     Indra Valerio MD, MPH  Obstetrics and Gyncology, PGY-3  July 9, 2023 , 8:43 AM      ATTESTATION:   I was scrubbed and present for the entire procedure. I agree with the above note which I have edited to reflect my findings.   Tea Carcamo MD

## 2023-07-09 NOTE — PLAN OF CARE
Pre-Eclampsia Shift Note  Data: Blood pressures within parameters.   Vitals:    07/08/23 1710 07/08/23 2045 07/08/23 2256 07/09/23 0300   BP: (!) 142/94 (!) 148/97 (!) 139/90 123/76   BP Location:   Left arm Right arm   Patient Position:   Semi-Massey's Supine   Cuff Size:   Adult Regular Adult Regular   Pulse:    86   Resp: 18 21 19 18   Temp:   98.2  F (36.8  C) 98.4  F (36.9  C)   TempSrc:   Oral Oral   SpO2:       Weight:       .   Vitals:    07/08/23 0607   Weight: 73.6 kg (162 lb 3.2 oz)   Adequate urine output. Signs and symptoms of pre-eclampsia absent overnight. Fetal assessment Reactive.  Pre-eclampsia labs to be drawn again this morning before surgery, lab called to send someone ASAP at 0650. Patient understandably nervous and anxious for delivery this morning. Asking many appropriate questions.  Interventions: Monitor blood pressures and indicators of pre-eclampsia every 4 hours. Per Dr. Ward, magnesium infusion to be started at 0800 prior to surgery. Continue uterine/fetal assessment 3 times daily. Activity level Regular activity. Encourage active range of motion and frequent position changes.  Plan: Observe for and notify care provider of indicators of worsening pre-eclampsia or signs of fetal/maternal compromise. Patient started prep for scheduled c/section at 0630. Report given to Savita HERNÁNDEZ RN at 0710.    Lizy Morse RN on 7/9/2023 at 7:24 AM

## 2023-07-09 NOTE — PLAN OF CARE
Goal Outcome Evaluation:      Plan of Care Reviewed With: patient    Overall Patient Progress: improvingOverall Patient Progress: improving     Data: Blood pressures have been elevated, but not severe range (pt's norm is 140s/100s) and she denies headache, vision changes, SOB, RUQ pain. Reflexes +3, no clonus. Magnesium IV infusing at 2g/hr, no s/s toxicity.  Other VSS, postpartum assessments WNL. She currently has a washington in place, she has sat up in bed, but hasn't been to the side of the bed or standing yet, not passing gas yet, is eating ice chips and a couple of crackers, c/o nausea (received zofran). Incision is covered, bandage is clean, dry and intact.  /Perineum appears to be healing well, lochia WNL, no s/s infection.    in the NICU; pumping independently.   Taking toradol. Reports good pain management.   Action: Education provided on plan of care  Response: Pt is agreeable with her plan of care. Visited NICU prior to coming to [facility].  Gabriel, is present and supportive. Anticipate discharge per care plan.

## 2023-07-09 NOTE — ANESTHESIA PROCEDURE NOTES
"TAP Procedure Note    Pre-Procedure   Staff -        Anesthesiologist:  Anneliese Addison MD       Resident/Fellow: Micha Cobos MD       Performed By: resident       Location: OR       Pre-Anesthestic Checklist: patient identified, IV checked, site marked, risks and benefits discussed, informed consent, monitors and equipment checked, pre-op evaluation, at physician/surgeon's request and post-op pain management  Timeout:       Correct Patient: Yes        Correct Procedure: Yes        Correct Site: Yes        Correct Position: Yes        Correct Laterality: Yes        Site Marked: Yes  Procedure Documentation  Procedure: TAP       Laterality: bilateral       Patient Position: supine       Patient Prep/Sterile Barriers: sterile gloves, mask       Skin prep: Chloraprep       Needle Type: short bevel       Needle Gauge: 21.        Needle Length (millimeters): 110        Ultrasound guided       1. Ultrasound was used to identify targeted nerve, plexus, vascular marker, or fascial plane and place a needle adjacent to it in real-time.       2. Ultrasound was used to visualize the spread of anesthetic in close proximity to the above referenced structure.       3. A permanent image is entered into the patient's record.    Assessment/Narrative         The placement was negative for: blood aspirated, painful injection and site bleeding       Paresthesias: No.       Bolus given via needle..        Secured via.        Insertion/Infusion Method: Single Shot       Complications: none    Medication(s) Administered   Bupivacaine 0.25% PF (Infiltration) - Infiltration   20 mL - 7/9/2023 10:25:00 AM  Bupivacaine liposome (Exparel) 1.3% LA inj susp (Infiltration) - Infiltration   20 mL - 7/9/2023 10:25:00 AM    FOR [facility] ([facility]) ONLY:   Pain Team Contact information: please page the Pain Team Via [employer]. Search \"Pain\". During daytime hours, please page the attending first. At night please page the resident " first.

## 2023-07-09 NOTE — ANESTHESIA CARE TRANSFER NOTE
Patient: Muna Tapia    Procedure: Procedure(s):   SECTION, WITH possible POSTPARTUM TUBAL LIGATION       Diagnosis: Previous  delivery, antepartum condition or complication [O34.219]  Diagnosis Additional Information: No value filed.    Anesthesia Type:   Spinal     Note:    Oropharynx: spontaneously breathing  Level of Consciousness: awake  Oxygen Supplementation: room air    Independent Airway: airway patency satisfactory and stable    Vital Signs Stable: post-procedure vital signs reviewed and stable    Patient transferred to: PACU    Handoff Report: Identifed the Patient, Identified the Reponsible Provider, Reviewed the pertinent medical history, Discussed the surgical course, Reviewed Intra-OP anesthesia mangement and issues during anesthesia, Set expectations for post-procedure period and Allowed opportunity for questions and acknowledgement of understanding      Vitals:  Vitals Value Taken Time   /110    Temp     Pulse 78    Resp 16    SpO2 100%        Electronically Signed By: Micha Cobos MD  2023  10:33 AM

## 2023-07-09 NOTE — PROGRESS NOTES
"Magnesium Check Postpartum  S:  Patient reports feeling \"a little bit off\", which she notes was similar to how she felt the last time she was on Mg. Denies any fever, chills, SOB, chest pain, N/V, headache, vision changes, RUQ pain, dizziness. Voiding spontaneously.     O:  Patient Vitals for the past 24 hrs:   BP Temp Temp src Pulse Resp SpO2   23 1445 (!) 145/103 -- -- 69 -- 98 %   23 1345 (!) 134/104 -- -- 82 -- 100 %   23 1315 (!) 144/107 -- -- 66 -- 96 %   23 1245 (!) 141/98 97.2  F (36.2  C) Axillary 74 18 99 %   23 1145 (!) 145/104 -- -- 67 -- 100 %   23 1130 (!) 139/105 -- -- 58 -- 99 %   23 1115 (!) 148/109 -- -- 79 18 99 %   23 1100 (!) 158/114 -- -- 70 -- 99 %   23 1050 (!) 147/106 -- -- 82 -- 98 %   23 1045 (!) 148/118 -- -- 84 -- 98 %   23 1035 (!) 137/103 -- -- 71 -- 98 %   23 1031 (!) 137/103 -- -- 70 -- 98 %   23 1030 (!) 148/102 97.4  F (36.3  C) Oral 67 -- 98 %   23 0848 (!) 152/96 -- -- -- -- --   23 0827 (!) 142/102 -- -- -- -- --   23 0815 (!) 149/103 -- -- -- -- --   23 0300 123/76 98.4  F (36.9  C) Oral 86 18 --   23 2256 (!) 139/90 98.2  F (36.8  C) Oral -- 19 --   23 (!) 148/97 -- -- -- 21 --   23 1710 (!) 142/94 -- -- -- 18 --       Gen: Resting comfortably, NAD  CV: RRR, no murmurs  Resp: Non-labored breathing, CTAB  Abd: Soft, non-tender, fundus below the umbilicus, bandage in place, clean and dry  Extrem: No edema BLE, no calf tenderness  Neuro: 2+ patellar reflexes, no clonus    UOP: 773 mL over 4 hours    HGB  Recent Labs   Lab 23  0732 23  2014 23  0642 23  1458   HGB 12.2 13.2 12.7 13.6       A/P:  Muna Tapia is a 38 year old  at 33w3d who is POD#1 s/p RLTCS c/b preeclampsia with severe features. Doing well, no signs of Mg toxicity. BP wnl.    # Preeclampsia w/ severe features:  - BP's 130-140s/90-100s, continue to " monitor and treat sustained severe range per protocol  - Mg 4g >2g. No s/s of mag toxicity. No symptoms  - Plan to discontinue magnesium at 24 hours postpartum  - Strict I&O, UOP appropriate   - pre-E labs wnl 7/9 AM   - Continue q4h clinical mag checks, next at 1910    Austin Dsouza MD   Obstetrics & Gynecology, PGY-1  07/09/2023 3:29 PM

## 2023-07-09 NOTE — PLAN OF CARE
Goal Outcome Evaluation:      Plan of Care Reviewed With: patient, spouse    Overall Patient Progress: improvingOverall Patient Progress: improving    Outcome Evaluation: BP's remain hypertensive but not within parameters to treat at this time. 30 mg of PO Nifedipine added this evening. Otherwise VSS. Pt pumping every two hours for infant in the NICU, getting small puddles of colostrum with each pumping session. Pt's nausea resided and pt now able to eat small amounts and drink fluids. Good UOP from washington catheter. Pt able to sit up in bed but has not yet stood at side of bed. Postpartum checks WNL. Pt denies s/sx of preecclampisa. Reflexes brisk with no clonus. Spouse, Gabriel. present and attentive at bedside. Pt enjoys watching infant daughter over the computer.      Problem: Plan of Care - These are the overarching goals to be used throughout the patient stay.    Goal: Optimal Comfort and Wellbeing  Outcome: Progressing  Intervention: Provide Person-Centered Care  Recent Flowsheet Documentation  Taken 2023 2152 by Ashlee Torres RN  Trust Relationship/Rapport:   care explained   choices provided   emotional support provided   empathic listening provided   questions answered   questions encouraged   reassurance provided   thoughts/feelings acknowledged     Problem: Postpartum ( Delivery)  Goal: Optimal Pain Control and Function  Outcome: Progressing     Problem: Postpartum ( Delivery)  Goal: Nausea and Vomiting Relief  Outcome: Progressing

## 2023-07-09 NOTE — PLAN OF CARE
Patient arrived to [facility] unit via zoom cart at 1230,with belongings, accompanied by spouse/ significant other, with infant in nicu. Received report from GLENIS Barney and checked bands. Unit and room orientation completd. Call light given; no concerns present at this time. Continue with plan of care.

## 2023-07-09 NOTE — PROVIDER NOTIFICATION
"   07/09/23 1245   Provider Notification   Provider Name/Title Indra Valerio G3   Method of Notification Phone   Request Evaluate-Remote   Notification Reason Status Update   Called OB resident due to patient hyperreflexia. This RN transferred patient to NICU to see her baby and then to [facility]. While pushing patient in the cart, patient's legs became very shaky. Got patient in to her room and took a blood pressure which was 141/98. Elicited patellar reflexes which were found to be very hyperreflexic and continued to reflex for 3-4 beats bilaterally. No clonus elicited from either leg. Bicep reflexes found to be normal. Patient states her head feels \"full.\" Asked Dr. Valerio if she would like to come assess patient. Will plan for magnesium level now. Aliyah GALVIN who assumes this patient updated.  "

## 2023-07-10 LAB
ALT SERPL W P-5'-P-CCNC: 59 U/L (ref 0–50)
AST SERPL W P-5'-P-CCNC: 54 U/L (ref 0–45)
CREAT SERPL-MCNC: 0.57 MG/DL (ref 0.51–0.95)
ERYTHROCYTE [DISTWIDTH] IN BLOOD BY AUTOMATED COUNT: 12.6 % (ref 10–15)
GFR SERPL CREATININE-BSD FRML MDRD: >90 ML/MIN/1.73M2
HCT VFR BLD AUTO: 37.9 % (ref 35–47)
HGB BLD-MCNC: 12.8 G/DL (ref 11.7–15.7)
MCH RBC QN AUTO: 33.5 PG (ref 26.5–33)
MCHC RBC AUTO-ENTMCNC: 33.8 G/DL (ref 31.5–36.5)
MCV RBC AUTO: 99 FL (ref 78–100)
PLATELET # BLD AUTO: 212 10E3/UL (ref 150–450)
RBC # BLD AUTO: 3.82 10E6/UL (ref 3.8–5.2)
WBC # BLD AUTO: 15.9 10E3/UL (ref 4–11)

## 2023-07-10 PROCEDURE — 85027 COMPLETE CBC AUTOMATED: CPT

## 2023-07-10 PROCEDURE — 250N000013 HC RX MED GY IP 250 OP 250 PS 637

## 2023-07-10 PROCEDURE — 84460 ALANINE AMINO (ALT) (SGPT): CPT

## 2023-07-10 PROCEDURE — 84450 TRANSFERASE (AST) (SGOT): CPT

## 2023-07-10 PROCEDURE — 250N000011 HC RX IP 250 OP 636: Mod: JZ

## 2023-07-10 PROCEDURE — 36415 COLL VENOUS BLD VENIPUNCTURE: CPT

## 2023-07-10 PROCEDURE — 120N000002 HC R&B MED SURG/OB UMMC

## 2023-07-10 PROCEDURE — 258N000003 HC RX IP 258 OP 636

## 2023-07-10 PROCEDURE — 250N000013 HC RX MED GY IP 250 OP 250 PS 637: Performed by: OBSTETRICS & GYNECOLOGY

## 2023-07-10 PROCEDURE — 82565 ASSAY OF CREATININE: CPT

## 2023-07-10 RX ORDER — SODIUM CHLORIDE, SODIUM LACTATE, POTASSIUM CHLORIDE, CALCIUM CHLORIDE 600; 310; 30; 20 MG/100ML; MG/100ML; MG/100ML; MG/100ML
INJECTION, SOLUTION INTRAVENOUS
Status: DISPENSED
Start: 2023-07-10 | End: 2023-07-10

## 2023-07-10 RX ORDER — LABETALOL 200 MG/1
400 TABLET, FILM COATED ORAL EVERY 12 HOURS SCHEDULED
Status: DISCONTINUED | OUTPATIENT
Start: 2023-07-10 | End: 2023-07-11

## 2023-07-10 RX ORDER — ENOXAPARIN SODIUM 100 MG/ML
40 INJECTION SUBCUTANEOUS EVERY 24 HOURS
Status: DISCONTINUED | OUTPATIENT
Start: 2023-07-10 | End: 2023-07-13 | Stop reason: HOSPADM

## 2023-07-10 RX ORDER — LABETALOL 200 MG/1
200 TABLET, FILM COATED ORAL ONCE
Status: COMPLETED | OUTPATIENT
Start: 2023-07-10 | End: 2023-07-10

## 2023-07-10 RX ADMIN — IBUPROFEN 800 MG: 800 TABLET ORAL at 23:00

## 2023-07-10 RX ADMIN — MAGNESIUM SULFATE HEPTAHYDRATE 2 G/HR: 40 INJECTION, SOLUTION INTRAVENOUS at 02:19

## 2023-07-10 RX ADMIN — LABETALOL HYDROCHLORIDE 200 MG: 200 TABLET, FILM COATED ORAL at 07:57

## 2023-07-10 RX ADMIN — ENOXAPARIN SODIUM 40 MG: 40 INJECTION SUBCUTANEOUS at 11:18

## 2023-07-10 RX ADMIN — ACETAMINOPHEN 975 MG: 325 TABLET, FILM COATED ORAL at 11:16

## 2023-07-10 RX ADMIN — SENNOSIDES AND DOCUSATE SODIUM 2 TABLET: 50; 8.6 TABLET ORAL at 20:24

## 2023-07-10 RX ADMIN — NIFEDIPINE 90 MG: 30 TABLET, FILM COATED, EXTENDED RELEASE ORAL at 07:57

## 2023-07-10 RX ADMIN — SODIUM CHLORIDE, POTASSIUM CHLORIDE, SODIUM LACTATE AND CALCIUM CHLORIDE 75 ML/HR: 600; 310; 30; 20 INJECTION, SOLUTION INTRAVENOUS at 05:21

## 2023-07-10 RX ADMIN — ACETAMINOPHEN 975 MG: 325 TABLET, FILM COATED ORAL at 23:00

## 2023-07-10 RX ADMIN — LABETALOL HYDROCHLORIDE 400 MG: 200 TABLET, FILM COATED ORAL at 20:24

## 2023-07-10 RX ADMIN — SENNOSIDES AND DOCUSATE SODIUM 1 TABLET: 50; 8.6 TABLET ORAL at 07:57

## 2023-07-10 RX ADMIN — ACETAMINOPHEN 975 MG: 325 TABLET, FILM COATED ORAL at 04:57

## 2023-07-10 RX ADMIN — KETOROLAC TROMETHAMINE 30 MG: 30 INJECTION, SOLUTION INTRAMUSCULAR; INTRAVENOUS at 04:57

## 2023-07-10 RX ADMIN — IBUPROFEN 800 MG: 800 TABLET ORAL at 17:19

## 2023-07-10 RX ADMIN — IBUPROFEN 800 MG: 800 TABLET ORAL at 11:16

## 2023-07-10 RX ADMIN — LABETALOL HYDROCHLORIDE 200 MG: 200 TABLET, FILM COATED ORAL at 08:33

## 2023-07-10 RX ADMIN — ACETAMINOPHEN 975 MG: 325 TABLET, FILM COATED ORAL at 17:19

## 2023-07-10 ASSESSMENT — ACTIVITIES OF DAILY LIVING (ADL)
ADLS_ACUITY_SCORE: 26

## 2023-07-10 NOTE — PROGRESS NOTES
Magnesium Check Postpartum    S: Patient report feeling improved, still a little crummy with mag. A little shakey. Denies any fever, chills, SOB, chest pain, N/V, headache, vision changes, RUQ pain, dizziness. Has washington in place.     O:  Patient Vitals for the past 24 hrs:   BP Temp Temp src Pulse Resp SpO2   07/09/23 1951 (!) 145/98 98  F (36.7  C) Oral 87 16 99 %   07/09/23 1850 (!) 152/107 -- -- 90 -- --   07/09/23 1747 (!) 156/104 -- -- 77 16 100 %   07/09/23 1648 (!) 144/107 97.6  F (36.4  C) Oral 70 18 99 %   07/09/23 1548 (!) 145/108 -- -- 68 -- 100 %   07/09/23 1445 (!) 145/103 -- -- 69 -- 98 %   07/09/23 1345 (!) 134/104 -- -- 82 -- 100 %   07/09/23 1315 (!) 144/107 -- -- 66 -- 96 %   07/09/23 1245 (!) 141/98 97.2  F (36.2  C) Axillary 74 18 99 %   07/09/23 1145 (!) 145/104 -- -- 67 -- 100 %   07/09/23 1130 (!) 139/105 -- -- 58 -- 99 %   07/09/23 1115 (!) 148/109 -- -- 79 18 99 %   07/09/23 1100 (!) 158/114 -- -- 70 -- 99 %   07/09/23 1050 (!) 147/106 -- -- 82 -- 98 %   07/09/23 1045 (!) 148/118 -- -- 84 -- 98 %   07/09/23 1035 (!) 137/103 -- -- 71 -- 98 %   07/09/23 1031 (!) 137/103 -- -- 70 -- 98 %   07/09/23 1030 (!) 148/102 97.4  F (36.3  C) Oral 67 -- 98 %   07/09/23 0848 (!) 152/96 -- -- -- -- --   07/09/23 0827 (!) 142/102 -- -- -- -- --   07/09/23 0815 (!) 149/103 -- -- -- -- --   07/09/23 0300 123/76 98.4  F (36.9  C) Oral 86 18 --   07/08/23 2256 (!) 139/90 98.2  F (36.8  C) Oral -- 19 --   07/08/23 2045 (!) 148/97 -- -- -- 21 --       Gen: Resting comfortably, NAD  CV: RRR, no murmurs  Resp: Non-labored breathing, CTAB  Abd: Soft, non-tender, fundus below the umbilicus, bandage in place, clean and dry  Extrem: No edema BLE, no calf tenderness  Neuro: 3+ patellar reflexes, no clonus    UOP:   Intake/Output Summary (Last 24 hours) at 7/9/2023 2007  Last data filed at 7/9/2023 1956  Gross per 24 hour   Intake 2730 ml   Output 3358 ml   Net -628 ml       HGB  Recent Labs   Lab 07/09/23  0742  23  0642 23  1458   HGB 12.2 13.2 12.7 13.6       A/P:  Muna Tapia is a 38 year old  at 33w3d who is POD#0 s/p RLTCS c/b preeclampsia with severe features. She is s/p 4 day antepartum stay for pre-eclampsia. Doing well, no signs of Mg toxicity. BP elevated, currently titrating BP medications.     # Preeclampsia w/ severe features (BP)  - BP's low and high mild range, did require one dose of immediate acting labetalol 20 earlier today.    -- She is now D#1 nifed 90 (received 60 this AM, 30 later this PM)   - Mg 4g >2g. No s/s of mag toxicity. No symptoms  - Plan to discontinue magnesium at 24 hours postpartum  - Strict I&O, UOP appropriate   - HELLP wnl 7 AM. Repeat ordered for tomorrow AM  - Continue q4h clinical mag checks    Marlene Avendaño MD MPH  OB/Gyn Resident PGY-3  2023

## 2023-07-10 NOTE — PLAN OF CARE
Goal Outcome Evaluation:      Plan of Care Reviewed With: patient, spouse    Overall Patient Progress: improvingOverall Patient Progress: improving         Data: Blood pressures have been WDL and she denies headache, vision changes, SOB, RUQ pain. Reflexes +3, no clonus. Magnesium stopped at 0930, no s/s toxicity.  Other VSS, postpartum assessments WNL. She is voiding without difficulty, up ad young, passing gas, eating and drinking without nausea. Incision appears to be healing well, lochia WNL, no s/s infection.   [place] in the NICU; pumping independently.   Taking ibuprofen and tylenol for pain and using abdominal binder. Reports good pain management.   Action: Education provided on plan of care  Response: Pt is agreeable with her plan of care. Goes to NICU to visit the baby throughout the day. , Gabriel, is supportive and present on and off throughout the day. Anticipate discharge per care plan.

## 2023-07-10 NOTE — PROGRESS NOTES
"Post  Anesthesia Follow Up Note    Patient: Muna Tapia    Patient location: Postpartum floor.    Chief complaint: Acute postoperative pain management s/p intrathecal analgesic administration     Procedure(s) Performed:  Procedure(s):   SECTION, WITH possible POSTPARTUM TUBAL LIGATION    Anesthesia type: Spinal Block    Subjective:     Pain Control: Adequate    Additional ROS:  She does not complain of pruritis at this time. She denies weakness, denies paresthesia, denies difficulties breathing, has Boyle, denies nausea or vomiting. She is able to ambulate and tolerates regular diet.    Objective:    Respiratory Function (RR / SpO2 / Airway Patency): Satisfactory    Cardiac Function (HR / Rhythm / BP): Satisfactory    Strength and sensation lower extremities: Normal    Site of spinal/epidural insertion: No signs of infection or inflammation.     Last Vitals: BP (!) 128/95   Pulse 73   Temp 36.7  C (98  F) (Oral)   Resp 16   Ht 1.689 m (5' 6.5\")   Wt 70.9 kg (156 lb 4.8 oz)   LMP 2022   SpO2 100%   Breastfeeding Unknown   BMI 24.85 kg/m      Assessment and plan:   Muna Tapia is a 38 year old female  POD #1 s/p   with IT bupivacaine, fentanyl and morphine; and single shot TAP nerve block injections.  Pt is ambulating without difficulty, no weakness or paresthesias.  There is no evidence of adverse side effects associated with spinal and nerve block injections.  The patient is receiving adequate incisional pain control at this time and anticipate up to 72 hours of incisional pain control.  However, we further anticipate that the patient will require opioid/nonopioid analgesics for visceral and muscle pain that is not controlled with local anesthetic.      In brief summary, her post-operative analgesia is adequate today. Further interventional analgesic strategies would be of little utility at this time. Thus, we recommend proceeding with PO " analgesics.    Thank you for including us in the care for this patient.    Garrett Mcbride MD  Anesthesiology Resident, CA-2, PGY-3

## 2023-07-10 NOTE — PLAN OF CARE
Data: Blood pressures are elevated but all other vital signs within normal limits. Postpartum checks within normal limits - see flow record. Patient eating and drinking normally. Patient has her catheter in with outputs of 1000 ml - 2150 ml. Patient has dangled at the edge of the bed and stood up with slight dizziness but well tolerated. No apparent signs of infection. Incision is not visible. Patient is pumping every 2-3 hours.    Action: Patient medicated during the shift for pain. See MAR. Patient reassessed after each medication and patient stated she was comfortable. See flow record.  Gabriel is present. Patient would like the washington out once magnesium is done and before going to the NICU to see infant.    Plan: Continue to follow care plan.

## 2023-07-10 NOTE — PROVIDER NOTIFICATION
07/10/23 0725   Provider Notification   Provider Name/Title G2 no name listed   Method of Notification Electronic Page   Request Evaluate-Remote   Notification Reason Medication Request     Pt Mg started at 0813 7/9, c/s 0941.  Pt would like Mg to stop at 0813 because that was her understanding. Can it be stopped at 0813?

## 2023-07-10 NOTE — CONSULTS
This psychosocial assessment is copied from baby's chart    Social Work Initial Consult     DATA/ASSESSMENT     General Information  Assessment completed with: Parents, Sharon mother  Type of visit: Psychosocial Assessment      Reason for Consult: other (see comments) (NICU admission)     Living Environment:   Primary caregiver: mother, father  Lives with: mother, father, brother         Current living arrangements: house          Able to return to prior arrangements: yes        Family Factors  Family Risk Factors: history of infertility/loss, other children at home needing care and attention, unexpected hospitalization  Family Strength Factors: able and willing to advocate for self/family, able and willing to ask for help/accept help, connected with mental health support, demonstrated commitment to being present and engaged in cares, experienced parents, local family, parental employment, reliable transportation, stable housing, strong social support  Neglect and Abuse: n/a      Substance Exposure: n/a     Assessment of Support  Parental Marital Status:   Who is your support system?: Grandparent(s) Description of Support System: Supportive        Employment/Financial  Patient's caregiver works full/part time: Yes Patient's caregiver able to return to work: yes   Patient works full/part time: No        Coping/Stress  Major Change/Loss/Stressor: death of a loved one   Patient Personal Strengths: assertive, courageous, expressive of needs, future/goal oriented, motivated, resilient, successful coping history Sources of Support: other family members, parent(s) Techniques to [place] with Loss/Stress/Change: counseling   Reaction to Health Status: adjusting, anxious, hopeful, uncertainty Understanding of Condition and Treatment: adequate understanding of medical condition, adequate understanding of treatment      Additional Information:  Sharon is a 38 year old  who delivered a baby girl Sandra at  33w3d gestation via . This writer met with Sharon and her  Gabriel at bedside in their [facility] room.  Gabriel was listening but working on his laptop.  Sharon engaged easily in conversation and shows clear insight.  Sharon and Gabriel chose to deliver at [facility] due to a previous traumatic delivery at [facility].  That child Iván  recently at age 4 after being born with severe HIE.  Sharon and Gabriel also have a eight (almost nine) year old son, Reymundo.  Sandra's unexpected early birth has created much anxiety for the family partially due to their previous loss and NICU experience.  Reymundo worked with a Child Life Specialist at [facility] when his brother was sick which was a positive experience. Sharon will ask Reymundo if he is interested in working with Child Life with this hospitalization.  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  She reports she is motivated by her experience as a parent of a special needs child.  Sharon articulated a strong interest in attending Rounds face to face, and providing as many cares as possible for Sandra. She would love to be considered for a private room when appropriate.  She hopes to connect with OT as soon as possible to better understand how OT works in this NICU.  She reports all families at [facility] are in private rooms so she is used to speaking with all providers who work with NICU babies.  Sharon denies any current concerns about mental health outside the loss of her son recently.  She does have a therapist she engaged with during her third trimester.  She plans to continue seeing this therapist.  Discussed Round, discounted parking, Child Life referral and visiting baby after discharge.  Sharon is appropriately grieving the loss of her son and has good insight as to how that experience impacts this NICU admission.  She mayra by having structure and a routine when possible.      INTERVENTION     Conducted chart review and consulted with medical team regarding plan of care. Introduced SW role and scope of practice.      Orientation to the unit (parking, lodging, meals, visitation)  Provided assessment of patient and family's level of coping  Conducted psychosocial assessment   Provided psychosocial supportive counseling and crisis intervention  Validated emotions and provided supportive listening  Provided psychoeducation surrounding the impact of new diagnosis and treatment  Assessed coping and adjustment to new diagnosis, subsequent hospital admission and treatment     Provided SW contact info     PLAN     SW will continue to follow for supportive intervention.      Kinsey ALVAREZW, MSW, [facility]  Maternal Child Health   [phone]--office  [phone]--pager

## 2023-07-10 NOTE — PROGRESS NOTES
[facility]  Magnesium Check Note/Post Partum Check Note    S:   Patient is feeling well.  Denies headache, vision changes/spots in vision, chest pain, shortness of breath, RUQ or epigastric pain. No contractions.    She is otherwise feeling well, ambulating without difficulty. She is tolerating PO intake without nausea or vomiting. She would like to have her catheter removed so that she can go downstairs. She denies lightheadedness. She is pumping without concerns/questions. Very much looking forward to going to the NICU.    O:  Patient Vitals for the past 4 hrs:   BP Temp Temp src Pulse Resp SpO2   07/10/23 0548 (!) 131/93 -- -- 76 -- 98 %   07/10/23 0450 (!) 128/94 -- -- 82 -- 98 %   07/10/23 0350 (!) 129/90 98  F (36.7  C) Oral 84 16 99 %   07/10/23 0255 (!) 132/91 -- -- 93 -- 98 %     Gen: Appears well, NAD   CV:  RRR, no murmurs  Pulm:  CTAB, no wheezes or crackles  Abd:  Gravid, soft, non-tender  Ext:  Patellar reflexes 2+ b/l, No edema b/l    I/O:    Intake/Output Summary (Last 24 hours) at 7/10/2023 0640  Last data filed at 7/10/2023 0553  Gross per 24 hour   Intake 3530 ml   Output 7908 ml   Net -4378 ml       A/P:  Muna Tapia is a 38 year old  at 33w3d who is POD#1 s/p RLTCS c/b preeclampsia with severe features. Doing well, no signs of Mg toxicity. BP wnl.     # Preeclampsia w/ severe features:  - BP's low to high mild overnight, continue to monitor and treat sustained severe range per protocol  - She is now D#2 90, D#2 labet 200 BID -- will plan to increase labetalol to 400 BID today given the persistently elevated BP  - Mg 4g >2g. No s/s of mag toxicity. No symptoms.   - Plan to discontinue magnesium at 24 hours postpartum. Ordered to come off this AM.   - Strict I&O, UOP appropriate   - pre-E labs wnl 7/10 AM     # Continue routine post-partum cares.     - Heme: Hgb 13.2 > > 12.2  - Baby: stable in NICU  - Contraception: partner planning on  vasectomy  - Rh, positive. Rhogam not indicated   - Rubella immune  - Ppx: SCDs, encourage ambulation   Dispo: discharge to home likely PPD#2-3    Senthil Mckeon  [employer], MS3  07/10/2023 6:37 AM     Marlene Avendaño MD MPH  OB/Gyn Resident PGY-3  7/10/2023

## 2023-07-10 NOTE — PROVIDER NOTIFICATION
07/10/23 0649   Provider Notification   Provider Name/Title G3 Maury Avendaño   Method of Notification Electronic Page   Request Evaluate-Remote   Notification Reason Vital Signs Change;Status Update     FYI - /105

## 2023-07-11 LAB
ALT SERPL W P-5'-P-CCNC: 42 U/L (ref 0–50)
AST SERPL W P-5'-P-CCNC: 29 U/L (ref 0–45)
CREAT SERPL-MCNC: 0.76 MG/DL (ref 0.51–0.95)
ERYTHROCYTE [DISTWIDTH] IN BLOOD BY AUTOMATED COUNT: 12.7 % (ref 10–15)
GFR SERPL CREATININE-BSD FRML MDRD: >90 ML/MIN/1.73M2
HCT VFR BLD AUTO: 32.9 % (ref 35–47)
HGB BLD-MCNC: 11.1 G/DL (ref 11.7–15.7)
MCH RBC QN AUTO: 33.3 PG (ref 26.5–33)
MCHC RBC AUTO-ENTMCNC: 33.7 G/DL (ref 31.5–36.5)
MCV RBC AUTO: 99 FL (ref 78–100)
PLATELET # BLD AUTO: 192 10E3/UL (ref 150–450)
RBC # BLD AUTO: 3.33 10E6/UL (ref 3.8–5.2)
WBC # BLD AUTO: 13.8 10E3/UL (ref 4–11)

## 2023-07-11 PROCEDURE — 85048 AUTOMATED LEUKOCYTE COUNT: CPT

## 2023-07-11 PROCEDURE — 250N000013 HC RX MED GY IP 250 OP 250 PS 637

## 2023-07-11 PROCEDURE — 120N000002 HC R&B MED SURG/OB UMMC

## 2023-07-11 PROCEDURE — 84460 ALANINE AMINO (ALT) (SGPT): CPT

## 2023-07-11 PROCEDURE — 82565 ASSAY OF CREATININE: CPT

## 2023-07-11 PROCEDURE — 36415 COLL VENOUS BLD VENIPUNCTURE: CPT

## 2023-07-11 PROCEDURE — 250N000011 HC RX IP 250 OP 636: Mod: JZ

## 2023-07-11 PROCEDURE — 84450 TRANSFERASE (AST) (SGOT): CPT

## 2023-07-11 RX ORDER — LABETALOL 300 MG/1
600 TABLET, FILM COATED ORAL EVERY 12 HOURS SCHEDULED
Status: DISCONTINUED | OUTPATIENT
Start: 2023-07-11 | End: 2023-07-12

## 2023-07-11 RX ADMIN — LABETALOL HYDROCHLORIDE 600 MG: 300 TABLET, FILM COATED ORAL at 08:40

## 2023-07-11 RX ADMIN — IBUPROFEN 800 MG: 800 TABLET ORAL at 11:33

## 2023-07-11 RX ADMIN — SENNOSIDES AND DOCUSATE SODIUM 1 TABLET: 50; 8.6 TABLET ORAL at 20:26

## 2023-07-11 RX ADMIN — ACETAMINOPHEN 975 MG: 325 TABLET, FILM COATED ORAL at 22:40

## 2023-07-11 RX ADMIN — IBUPROFEN 800 MG: 800 TABLET ORAL at 05:12

## 2023-07-11 RX ADMIN — ACETAMINOPHEN 975 MG: 325 TABLET, FILM COATED ORAL at 05:12

## 2023-07-11 RX ADMIN — NIFEDIPINE 90 MG: 30 TABLET, FILM COATED, EXTENDED RELEASE ORAL at 08:40

## 2023-07-11 RX ADMIN — ENOXAPARIN SODIUM 40 MG: 40 INJECTION SUBCUTANEOUS at 11:33

## 2023-07-11 RX ADMIN — ACETAMINOPHEN 975 MG: 325 TABLET, FILM COATED ORAL at 11:33

## 2023-07-11 RX ADMIN — IBUPROFEN 800 MG: 800 TABLET ORAL at 22:40

## 2023-07-11 RX ADMIN — ACETAMINOPHEN 975 MG: 325 TABLET, FILM COATED ORAL at 17:36

## 2023-07-11 RX ADMIN — SENNOSIDES AND DOCUSATE SODIUM 1 TABLET: 50; 8.6 TABLET ORAL at 08:41

## 2023-07-11 RX ADMIN — LABETALOL HYDROCHLORIDE 600 MG: 300 TABLET, FILM COATED ORAL at 20:25

## 2023-07-11 RX ADMIN — IBUPROFEN 800 MG: 800 TABLET ORAL at 17:36

## 2023-07-11 ASSESSMENT — ACTIVITIES OF DAILY LIVING (ADL)
ADLS_ACUITY_SCORE: 18
ADLS_ACUITY_SCORE: 26
ADLS_ACUITY_SCORE: 18
ADLS_ACUITY_SCORE: 26
ADLS_ACUITY_SCORE: 18
ADLS_ACUITY_SCORE: 18
ADLS_ACUITY_SCORE: 26
ADLS_ACUITY_SCORE: 18
ADLS_ACUITY_SCORE: 26
ADLS_ACUITY_SCORE: 18

## 2023-07-11 NOTE — PROGRESS NOTES
OB Postpartum Progress Note    S: She continues to feel well. She does note lower extremity swelling that is improved this morning. She otherwise is ambulating well without lightheadedness or dizziness. She is able to urinate without difficulty. She has passed flatus but has not had a BM. She does have mild post-op abdominal pain but discomfort is well controlled with Tylenol and Ibuprofen. She does not have RUQ pain. Denies headache, vision changes/spots in vision, chest pain, shortness of breath.  O:  Vitals:    07/10/23 2026 07/10/23 2042 07/11/23 0100 23 0511   BP: (!) 141/94 (!) 140/101 116/81 (!) 138/105   BP Location: Left arm   Right arm   Patient Position: Sitting   Sitting   Cuff Size: Adult Regular   Adult Regular   Pulse: 98 98 87 74   Resp: 18 18 18 18   Temp: 98.3  F (36.8  C)      TempSrc: Oral      SpO2:       Weight:    70.3 kg (155 lb 0.4 oz)   Height:           Gen: NAD. Alert, oriented. Resting comfortably in bed.  CV: Regular rate, well perfused  Resp: On room air, no increased work of breathing  Abd: Soft, appropriately tender  Ext: Moderate edema up to ankles bilaterally      Intake/Output Summary (Last 24 hours) at 2023 0636  Last data filed at 2023 0511  Gross per 24 hour   Intake 3700 ml   Output 4000 ml   Net -300 ml       A/P:  Muna Tapia is a 38 year old  at 33w3d who is POD#2 s/p RLTCS c/b preeclampsia with severe features. Doing well. BP low mild range to mild range     # Preeclampsia w/ severe features:  - BP's low to high mild overnight, continue to monitor and treat sustained severe range per protocol  - She is now D#3 90, D#1 labet 600 BID -- had BPs in 140s/90s-100s overnight,   - Magnesium discontinued 710 AM  - Strict I&O, UOP appropriate   - pre-E labs wnl 710 AM   - ALT up 39 > 59 and ALT up 21 > 54 on 7/10, repeat pending     # Continue routine post-partum cares.               - Heme: Hgb 13.2 > > 12.2  - Baby: stable in NICU  -  Contraception: partner planning on vasectomy  - Rh, positive. Rhogam not indicated   - Rubella immune  - Ppx: SCDs, encourage ambulation   Dispo: discharge to home likely PPD#2-3      Senthil Mckeon, MS3    Resident/Fellow Attestation   I, Tate Arevalo, was present with the medical student who participated in the service and in the documentation of the note.  I have verified the history and personally performed the physical exam and medical decision making.  I agree with the assessment and plan of care as documented in the note.  I have reviewed the note and made changes as necessary.    Tate Arevalo DO MA  OBGYN-PGY2              Physician Attestation   I personally examined and evaluated this patient.  I discussed the patient with the resident/fellow and care team, and agree with the assessment and plan of care as documented in the note.     Key findings:   BP continuing in high mild range diastolic.   If next check elevated, will increase labetalol. Pt prefers not to add hydrochlorothiazide.   Anticipate discharge tomorrow with ongoing BP management as needed at home.     Please see A&P for additional details of medical decision making.    I have personally reviewed the following data over the past 24 hrs:    13.8 (H)  \   11.1 (L)   / 192     N/A N/A N/A /  N/A   N/A N/A 0.76 \       ALT: 42 AST: 29 AP: N/A TBILI: N/A   ALB: N/A TOT PROTEIN: N/A LIPASE: N/A         Savita Valerio MD  Date of Service (when I saw the patient): 07/11/23

## 2023-07-11 NOTE — PLAN OF CARE
Pt VSS, ex elevated BP - MD aware.  Pt pain managed with tylenol and ibuprofen.  Pt pumping Q2-3 hr and visiting baby in NICU frequently. C/s incision MIRIAM, healing well.  Pt is ambulating independently, voiding spontaneously, and tolerating regular diet.  Continue with plan of care.

## 2023-07-11 NOTE — LACTATION NOTE
This note was copied from a baby's chart.    Lactation Note    Baby and Family Information:  Infant's first name:  Sandra  Infant medical history: Born premature at 33w3d     needed? No    Lactation goal (if known): Breastfeed as long as possible     Mother's Name: Muna   Occupation:    Age: 38  Partner's name: Gabriel  Occupation:    [place]: [employer]  Delivery type:     Lactation history:  first child, Reymundo for 15months (now 9 years old); pumped and bottled for second child, Iván, who  last year at 5 years old (history of HIE)     Relevant maternal medical and social hx:       Information for the patient's mother:  WillieRosa Iselajosee ADRY GARCES [[id number]]     Patient Active Problem List   Diagnosis     HGSIL (high grade squamous intraepithelial dysplasia)     Vegetarian diet     Family history of thyroid disease in mother     High-risk pregnancy, unspecified trimester     Previous  delivery, antepartum condition or complication     History of placenta abruption     Preeclampsia, severe, third trimester          Relevant maternal medications:      Labetalol (Hale L2)  Nifedipine (Hale L2)     Maternal risk factors:  Depression  Anxiety  Hypertension (details) Severe Pre-eclampsia  Placenta delivery complications  Placental Abruption     Equipment:  Hands-free pumping bra:  [x]yes purchased HFPB and belly band  []No  Nipple shield size:  []16mm  []20mm  []24mm  Pump type: Recommended [employer] rental, also has new purchased Medela pump (?Pump in Style)   Flange Size:      Admission Education given:  [x]Kangaroo care  [x]Benefits of breast milk  [x]How breast milk is made  [x]Stages of milk production  [x]Milk supply/ goal volumes  [x]Hand expression  [x]Hands-on pumping  [x]Collecting, labeling, transporting milk  [x]Cleaning, sanitizing pump parts  [x]Storage of milk      Supply checks:  []5 day-- Logging     []5 day-- Hand expression     []5 day-- Hands-on pumping     []5 day--  Maintain setting     []10 day-- Water trick     []10 day-- 5 senses     []10 day-- Milk making reminder     []30 day-- Birth control plans?      []30 day-- Back to work plan?      []30 day-- Magic number     []30 day-- Deep freezer?          Handouts given:  [x]NICU admission packet     []Multiples  []Lecithin  []Massage  []Hypnosis  []Reglan  []Wellness center  []Discharge-- [facility] peer counselor  []Discharge-- signs of a good feeding  []Discharge-- [facility] milk storage  []Discharge-- First week feeding log  []Discharge-- After first week feeding log  []Discharge-- [place] lactation resources      Lactation Visits/ Health Team Rounds information:  Date Age LC Name Visit Details   07/10/23   1 day CATIE Martin, RN, IBCLC  Lactation admission              Lactation Consultant Contact Information  Ascom: *[phone]  Office: [phone]

## 2023-07-11 NOTE — PROVIDER NOTIFICATION
07/11/23 0545   Provider Notification   Provider Name/Title G2 Tate Trevino   Method of Notification Electronic Page   Request Evaluate-Remote   Notification Reason Vital Signs Change  (/105 no other symptoms)

## 2023-07-11 NOTE — PROVIDER NOTIFICATION
07/11/23 1244   Provider Notification   Provider Name/Title G2 Dr Sheffield   Method of Notification Electronic Page   Request Evaluate-Remote   Notification Reason Vital Signs Change  (FYI Pt BP on last check was 132/93)

## 2023-07-11 NOTE — PLAN OF CARE
Goal Outcome Evaluation:  VSS, ex BP elevated. Denies pre e symptoms. Pain managed with tylenol and ibuprofen. Up ad young, voiding freely. Pumping independently. Visiting baby in NICU frequently. Will continue with plan of care.

## 2023-07-11 NOTE — PROVIDER NOTIFICATION
07/10/23 2051   Provider Notification   Provider Name/Title G2 Irina Ybarra.   Method of Notification Electronic Page   Request Evaluate-Remote   Notification Reason Vital Signs Change  (/94  140/101)

## 2023-07-12 VITALS
SYSTOLIC BLOOD PRESSURE: 124 MMHG | HEART RATE: 86 BPM | WEIGHT: 155.9 LBS | BODY MASS INDEX: 24.47 KG/M2 | TEMPERATURE: 98.3 F | OXYGEN SATURATION: 100 % | RESPIRATION RATE: 16 BRPM | DIASTOLIC BLOOD PRESSURE: 91 MMHG | HEIGHT: 67 IN

## 2023-07-12 PROBLEM — O14.90 PREECLAMPSIA: Status: ACTIVE | Noted: 2023-07-12

## 2023-07-12 PROCEDURE — 250N000011 HC RX IP 250 OP 636: Mod: JZ

## 2023-07-12 PROCEDURE — 250N000013 HC RX MED GY IP 250 OP 250 PS 637

## 2023-07-12 RX ORDER — SENNA AND DOCUSATE SODIUM 50; 8.6 MG/1; MG/1
1 TABLET, FILM COATED ORAL AT BEDTIME
Qty: 30 TABLET | Refills: 0 | Status: SHIPPED | OUTPATIENT
Start: 2023-07-12 | End: 2023-07-12

## 2023-07-12 RX ORDER — NIFEDIPINE 90 MG/1
90 TABLET, FILM COATED, EXTENDED RELEASE ORAL EVERY 24 HOURS
Qty: 90 TABLET | Refills: 3 | Status: SHIPPED | OUTPATIENT
Start: 2023-07-13 | End: 2024-06-26

## 2023-07-12 RX ORDER — ACETAMINOPHEN 325 MG/1
325-650 TABLET ORAL EVERY 6 HOURS PRN
Qty: 60 TABLET | Refills: 0 | Status: SHIPPED | OUTPATIENT
Start: 2023-07-12 | End: 2023-07-12

## 2023-07-12 RX ORDER — SENNA AND DOCUSATE SODIUM 50; 8.6 MG/1; MG/1
1 TABLET, FILM COATED ORAL AT BEDTIME
Qty: 30 TABLET | Refills: 0 | Status: SHIPPED | OUTPATIENT
Start: 2023-07-12 | End: 2024-06-26

## 2023-07-12 RX ORDER — ACETAMINOPHEN 325 MG/1
325-650 TABLET ORAL EVERY 6 HOURS PRN
Qty: 60 TABLET | Refills: 0 | Status: SHIPPED | OUTPATIENT
Start: 2023-07-12 | End: 2024-06-26

## 2023-07-12 RX ORDER — LABETALOL 200 MG/1
200 TABLET, FILM COATED ORAL ONCE
Status: COMPLETED | OUTPATIENT
Start: 2023-07-12 | End: 2023-07-12

## 2023-07-12 RX ORDER — IBUPROFEN 600 MG/1
600 TABLET, FILM COATED ORAL EVERY 6 HOURS PRN
Qty: 60 TABLET | Refills: 0 | Status: SHIPPED | OUTPATIENT
Start: 2023-07-12

## 2023-07-12 RX ORDER — NIFEDIPINE 90 MG/1
90 TABLET, FILM COATED, EXTENDED RELEASE ORAL EVERY 24 HOURS
Qty: 90 TABLET | Refills: 3 | Status: SHIPPED | OUTPATIENT
Start: 2023-07-13 | End: 2023-07-12

## 2023-07-12 RX ORDER — HYDROCHLOROTHIAZIDE 12.5 MG/1
12.5 TABLET ORAL DAILY
Qty: 5 TABLET | Refills: 0 | Status: SHIPPED | OUTPATIENT
Start: 2023-07-12 | End: 2024-06-26

## 2023-07-12 RX ORDER — LABETALOL 200 MG/1
800 TABLET, FILM COATED ORAL 3 TIMES DAILY
Qty: 720 TABLET | Refills: 0 | Status: SHIPPED | OUTPATIENT
Start: 2023-07-12 | End: 2024-06-26

## 2023-07-12 RX ORDER — HYDROCHLOROTHIAZIDE 12.5 MG/1
12.5 TABLET ORAL DAILY
Qty: 5 TABLET | Refills: 0 | Status: SHIPPED | OUTPATIENT
Start: 2023-07-12 | End: 2023-07-12

## 2023-07-12 RX ORDER — LABETALOL 300 MG/1
600 TABLET, FILM COATED ORAL 3 TIMES DAILY
Status: DISCONTINUED | OUTPATIENT
Start: 2023-07-12 | End: 2023-07-12

## 2023-07-12 RX ORDER — IBUPROFEN 600 MG/1
600 TABLET, FILM COATED ORAL EVERY 6 HOURS PRN
Qty: 60 TABLET | Refills: 0 | Status: SHIPPED | OUTPATIENT
Start: 2023-07-12 | End: 2023-07-12

## 2023-07-12 RX ORDER — LABETALOL 200 MG/1
800 TABLET, FILM COATED ORAL 3 TIMES DAILY
Qty: 720 TABLET | Refills: 0 | Status: SHIPPED | OUTPATIENT
Start: 2023-07-12 | End: 2023-07-12

## 2023-07-12 RX ADMIN — SENNOSIDES AND DOCUSATE SODIUM 1 TABLET: 50; 8.6 TABLET ORAL at 08:07

## 2023-07-12 RX ADMIN — LABETALOL HYDROCHLORIDE 600 MG: 300 TABLET, FILM COATED ORAL at 14:58

## 2023-07-12 RX ADMIN — IBUPROFEN 800 MG: 800 TABLET ORAL at 04:56

## 2023-07-12 RX ADMIN — ENOXAPARIN SODIUM 40 MG: 40 INJECTION SUBCUTANEOUS at 10:51

## 2023-07-12 RX ADMIN — LABETALOL HYDROCHLORIDE 200 MG: 200 TABLET, FILM COATED ORAL at 15:12

## 2023-07-12 RX ADMIN — ACETAMINOPHEN 975 MG: 325 TABLET, FILM COATED ORAL at 04:56

## 2023-07-12 RX ADMIN — IBUPROFEN 800 MG: 800 TABLET ORAL at 10:51

## 2023-07-12 RX ADMIN — NIFEDIPINE 90 MG: 30 TABLET, FILM COATED, EXTENDED RELEASE ORAL at 10:51

## 2023-07-12 RX ADMIN — LABETALOL HYDROCHLORIDE 600 MG: 300 TABLET, FILM COATED ORAL at 07:41

## 2023-07-12 RX ADMIN — ACETAMINOPHEN 975 MG: 325 TABLET, FILM COATED ORAL at 17:32

## 2023-07-12 RX ADMIN — ACETAMINOPHEN 975 MG: 325 TABLET, FILM COATED ORAL at 10:58

## 2023-07-12 RX ADMIN — IBUPROFEN 800 MG: 800 TABLET ORAL at 17:32

## 2023-07-12 ASSESSMENT — ACTIVITIES OF DAILY LIVING (ADL)
ADLS_ACUITY_SCORE: 18

## 2023-07-12 NOTE — PLAN OF CARE
Goal Outcome Evaluation:     VSS, BP was normal range last night after pt took her BP medication, but this morning around 4 am BP  mildly elevated 131/97, remained asymptomatic. Denies s/sx of pre-e, reflexes was normal, no clonus. Adequate I/O. No s/s infection.Taking tylenol and Ibuprofen for pain . Reports good pain management.Pt is agreeable with her plan of care. . Will continue with plan of care.

## 2023-07-12 NOTE — PROGRESS NOTES
OB Postpartum Progress Note    S: She is doing really well this morning and would like to go home. She is ambulating well without lightheadedness or dizziness. She is able to urinate without difficulty. She has passed flatus. Pain well controlled with PO meds. She does not have RUQ pain. Denies headache, vision changes/spots in vision, chest pain, shortness of breath.  O:  Vitals:    23 2020 23 2330 23 0430 23 0524   BP: (!) 147/95 127/86 (!) 131/97    BP Location: Right arm Right arm Right arm    Patient Position: Semi-Massey's Semi-Massey's Semi-Massey's    Cuff Size: Adult Regular Adult Regular Adult Regular    Pulse: 99 85 73    Resp: 17 16 18    Temp: 99  F (37.2  C) 98.5  F (36.9  C) 98.7  F (37.1  C)    TempSrc: Oral Oral Oral    SpO2:       Weight:    69.9 kg (154 lb 3.2 oz)   Height:           Gen: NAD. Alert, oriented. Resting comfortably in bed.  CV: Regular rate, well perfused  Resp: On room air, no increased work of breathing  Abd: Soft, appropriately tender, steris in place c/d/i  Ext: Mild edema up to ankles bilaterally      Intake/Output Summary (Last 24 hours) at 2023 0654  Last data filed at 2023 0245  Gross per 24 hour   Intake 1680 ml   Output 2200 ml   Net -520 ml           A/P:  Muna Tapia is a 38 year old  at 33w3d who is POD#3 s/p RLTCS c/b preeclampsia with severe features. Doing well. BP low mild range to mild range     # Preeclampsia w/ severe features:  - BP's low mild overnight, continue to monitor and treat sustained severe range per protocol  - She is now D#4 90, D#1 Labet 600 TID  - s/p 24h mag  - Strict I&O, UOP appropriate   - pre-E labs wnl 7/10 AM   - HELLP labs wnl 7/11     # Continue routine post-partum cares.               - Heme: Hgb 13.2 > > 12.2>11.1  - Baby: stable in NICU  - Contraception: partner planning on vasectomy  - Rh, positive. Rhogam not indicated   - Rubella immune  - Ppx: SCDs, encourage ambulation    Dispo: discharge to home likely today pending BP management    Tate Arevalo DO, MA  [facility]- PGY2  3:12 AM July 12, 2023         Physician Attestation   I, Cinthya Wagner, personally saw and examined Muna this evening. I have reviewed her vitals, labs, and medications. I have discussed the plan of care with the resident.   She is really struggling with lack of sleep in the hospital and anxiety about her 9 yr old at home who is really struggling with his mother and new sister being in the hospital.  They lost a 5 yr old last year and this is adding to the anxiety that both mother and son are experiencing.  Patient states no matter how tired she is, she cannot sleep in the hospital.  Would really like to go home.   Reviewed BP's are persistently in the low MR.  Given the lack of sleep and high anxiety state, this maybe contributing to her hypertension.    discussed dose adjustment for her labetalol and begin hydrochlorothiazide for a few days.  Ok for discharge this evening with close follow-up in clinic for BP check.   Hemoglobin   Date Value Ref Range Status   07/11/2023 11.1 (L) 11.7 - 15.7 g/dL Final   07/10/2023 12.8 11.7 - 15.7 g/dL Final    discussed routine post op/ pp cares and normal expectations for recovery.        Cinthya Wagner MD  Date of Service (when I saw the patient): July 12, 2023

## 2023-07-12 NOTE — PROVIDER NOTIFICATION
07/12/23 1504   Provider Notification   Provider Name/Title Dr. Sheffield   Method of Notification Electronic Page   Request Evaluate-Remote   Notification Reason Vital Signs Change     KB, last two BP's 133/91 at 1056 and /95 at 1450, per order notify if 2 BP's greater than DBP 90.

## 2023-07-12 NOTE — PLAN OF CARE
Problem: Hypertensive Disorders in Pregnancy  Goal: Maternal-Fetal Stabilization  Outcome: Progressing   Goal Outcome Evaluation:      Plan of Care Reviewed With: patient  's/80-90's. Denies symptoms of pre-eclampsia. Pain managed with scheduled ibuprofen and tylenol.  Pumping for  in NICU.

## 2023-07-12 NOTE — ANESTHESIA POSTPROCEDURE EVALUATION
Patient: Muna Tapia    Procedure: Procedure(s):   SECTION, WITH possible POSTPARTUM TUBAL LIGATION       Anesthesia Type:  Spinal    Note:  Disposition: Inpatient   Postop Pain Control: Uneventful            Sign Out: Well controlled pain   PONV: No   Neuro/Psych: Uneventful            Sign Out: Acceptable/Baseline neuro status   Airway/Respiratory: Uneventful            Sign Out: Acceptable/Baseline resp. status   CV/Hemodynamics: Uneventful            Sign Out: Acceptable CV status; No obvious hypovolemia; No obvious fluid overload   Other NRE: NONE   DID A NON-ROUTINE EVENT OCCUR? No           Last vitals:  Vitals Value Taken Time   /108 23 1200   Temp 36.3  C (97.4  F) 23 1030   Pulse 62 23 1201   Resp 18 23 1115   SpO2 98 % 23 1201   Vitals shown include unvalidated device data.    Electronically Signed By: Anneliese Addison MD  2023  9:52 AM

## 2023-07-12 NOTE — PROVIDER NOTIFICATION
07/12/23 1854   Provider Notification   Provider Name/Title Yohannes   Method of Notification Electronic Page   Request Evaluate in Person   Notification Reason Other     pt wondering if you guys will see her today. has not been seen my provider. cristian called earlier to see pt but didnt. please advise thanks

## 2023-07-13 ENCOUNTER — TELEPHONE (OUTPATIENT)
Dept: OBGYN | Facility: CLINIC | Age: 38
End: 2023-07-13
Payer: COMMERCIAL

## 2023-07-13 ENCOUNTER — PATIENT OUTREACH (OUTPATIENT)
Dept: CARE COORDINATION | Facility: CLINIC | Age: 38
End: 2023-07-13
Payer: COMMERCIAL

## 2023-07-13 LAB
PATH REPORT.COMMENTS IMP SPEC: NORMAL
PATH REPORT.COMMENTS IMP SPEC: NORMAL
PATH REPORT.FINAL DX SPEC: NORMAL
PATH REPORT.GROSS SPEC: NORMAL
PATH REPORT.MICROSCOPIC SPEC OTHER STN: NORMAL
PATH REPORT.RELEVANT HX SPEC: NORMAL
PHOTO IMAGE: NORMAL

## 2023-07-13 NOTE — PLAN OF CARE
Data: Vital signs stable, assessments within normal limits.   Tolerating regular diet. Voiding, passing gas.   Fundus firm, lochia controlled.   Discharge outcomes on care plan met.   No apparent pain.  Action: Review of care plan, teaching, and discharge instructions done.  Response: patient states understanding of discharge instructions. Discharge meds sent to patient pharmacy. Home care consult placed. All questions about mom and baby care addressed. Patient discharged at 2145.

## 2023-07-13 NOTE — TELEPHONE ENCOUNTER
Called pt to schedule PP visits  Pt delivered 7/9-C/s  1 week-tomorrow scheduled with Dr. Carcamo at [place]  6 week check week of 8/21-Dr. Ledesma on 8/21    Pt discussed possibly needing more frequent checks-will discuss with Dr. Carcamo tomorrow, currently has prenatal appts scheduled will leave in place and cancel per Dr. Carcamo's recs  Melany Prado RN on 7/13/2023 at 10:10 AM

## 2023-07-13 NOTE — PROGRESS NOTES
General acute hospital    Background: Transitional Care Management program identified per system criteria and reviewed by General acute hospital team for possible outreach.    Assessment: Upon chart review, CCR Team member will not proceed with patient outreach related to this episode of Transitional Care Management program due to reason below:    Patient has active communication with a nurse, provider or care team for reason of post-hospital follow up plan.  Outreach call by CCR team not indicated to minimize duplicative efforts.     Plan: Transitional Care Management episode addressed appropriately per reason noted above.      Carol Ann Parikh  Community Health Worker  [facility] [place]  [phone]      *Connected Care Resource Team does NOT follow patient ongoing. Referrals are identified based on internal discharge reports and the outreach is to ensure patient has an understanding of their discharge instructions.

## 2023-07-13 NOTE — DISCHARGE INSTRUCTIONS
Postop  Birth Instructions    Activity     Do not lift more than 10 pounds for 6 weeks after surgery.  Ask family and friends for help when you need it.  No driving until you have stopped taking your pain medications (usually two weeks after surgery).  No heavy exercise or activity for 6 weeks.  Don't do anything that will put a strain on your surgery site.  Don't strain when using the toilet.  Your care team may prescribe a stool softener if you have problems with your bowel movements.     To care for your incision:     Keep the incision clean and dry.  Do not soak your incision in water. No swimming or hot tubs until it has fully healed. You may soak in the bathtub if the water level is below your incision.  Do not use peroxide, gel, cream, lotion, or ointment on your incision.  Adjust your clothes to avoid pressure on your surgery site (check the elastic in your underwear for example).     You may see a small amount of clear or pink drainage and this is normal.  Check with your health care provider:     If the drainage increases or has an odor.  If the incision reddens, you have swelling, or develop a rash.  If you have increased pain and the medicine we prescribed doesn't help.  If you have a fever above 100.4 F (38 C) with or without chills when placing thermometer under your tongue.   The area around your incision (surgery wound), will feel numb.  This is normal. The numbness should go away in less than a year.     Keep your hands clean:  Always wash your hands before touching your incision (surgery wound). This helps reduce your risk of infection. If your hands aren't dirty, you may use an alcohol hand-rub to clean your hands. Keep your nails clean and short.    Call your healthcare provider if you have any of these symptoms:     You soak a sanitary pad with blood within 1 hour, or you see blood clots larger than a golf ball.  Bleeding that lasts more than 6 weeks.  Vaginal discharge that smells  bad.  Severe pain, cramping or tenderness in your lower belly area.  A need to urinate more frequently (use the toilet more often), more urgently (use the toilet very quickly), or it burns when you urinate.  Nausea and vomiting.  Redness, swelling or pain around a vein in your leg.  Problems breastfeeding or a red or painful area on your breast.  Chest pain and cough or are gasping for air.  Problems with coping with sadness, anxiety or depression. If you have concerns about hurting yourself or the baby, call your provider immediately.    You have questions or concerns after you return home.     Preeclampsia   Call your doctor right away if you have any of the following:  - Edema (swelling) in your face or hands  - Rapid weight gain-about 1 pound or more in a day  - Headache  - Abdominal pain on your right side  - Vision problems (flashes or spots)  - You have questions or concerns once you return home.

## 2023-07-14 ENCOUNTER — PRENATAL OFFICE VISIT (OUTPATIENT)
Dept: OBGYN | Facility: CLINIC | Age: 38
End: 2023-07-14
Payer: COMMERCIAL

## 2023-07-14 VITALS
BODY MASS INDEX: 23.85 KG/M2 | DIASTOLIC BLOOD PRESSURE: 72 MMHG | HEART RATE: 78 BPM | WEIGHT: 150 LBS | TEMPERATURE: 97 F | SYSTOLIC BLOOD PRESSURE: 115 MMHG

## 2023-07-14 PROCEDURE — 99213 OFFICE O/P EST LOW 20 MIN: CPT | Mod: 24 | Performed by: OBSTETRICS & GYNECOLOGY

## 2023-07-14 NOTE — PATIENT INSTRUCTIONS
Check BP in the morning and evening before taking meds   Also check BP if you feel lightheaded or any other weird symptoms    Decrease labetalol to 600mg three times per day (3 tabs) --> then decrease to 600mg twice a day --> 400mg twice a day  If blood pressure is mostly normal the rest of today and tomorrow, then do not take the hydrochlorothiazide anymore    Send a message on Monday with your blood pressures and we can help make more adjustments as needed

## 2023-07-14 NOTE — PROGRESS NOTES
OBGYN CLINIC VISIT  2023   CC: postpartum blood pressure check    HPI:  Carolina Tapia  is a 38 year old   who is 5 days postpartum status post repeat  section at 33w3d on 23 for pre-eclampsia with severe features.  Baby Sandra is doing well in NICU    Patient was discharged on the following antihypertensives:   - nifedipine XL 90mg PO daily (due to take a 11am)  - labetalol 800mg PO TID (took today at 7am)  - hydrochlorothiazide 12.5mg (took today at 7am)    BPs at home: 1 mild range (146/96), all others were normal 121/89, 106/71, 119/81, 121/90    On and off feels kind of loopy. Yesterday morning had a hard time getting going. Had to sit on the floor after her shower yesterday. Today getting ready felt lightheaded.     Headache: mild yesterday  Visual changes: no  RUQ/epigastric pain: no  Swelling: greatly improved, mostly on feet  Pain from delivery: taking ibuprofen and tylenol  Lochia: decreasing  Feeding: pumping for infant in NICU  Mood: good      OB History    Para Term  AB Living   3 3 1 2 0 2   SAB IAB Ectopic Multiple Live Births   0 0 0 0 3      # Outcome Date GA Lbr Hernando/2nd Weight Sex Delivery Anes PTL Lv   3  23 33w3d  1.84 kg (4 lb 0.9 oz) F CS-LTranv   JEFFERY      Name: CRUZ,FEMALE-CAROLINA RIDER      Apgar1: 9  Apgar5: 9   2  17 32w2d  1.928 kg (4 lb 4 oz) M CS-LTranv  Y LIVE BIRTH      Complications: Abruptio Placenta      Name: Iván Gene Cruz   1 Term 14 40w6d 17:48 / 02:23 3.751 kg (8 lb 4.3 oz) M Vag-Spont EPI N JEFFERY      Name: Reymundo      Apgar1: 8  Apgar5: 9       Past Medical History:   Diagnosis Date     History of blood transfusion      Preeclampsia, severe, third trimester 2023     Sprain of ankle      Varicella        Past Surgical History:   Procedure Laterality Date     BIOPSY CERVICAL, LOCAL EXCISION, SINGLE/MULTIPLE        SECTION N/A 2017    LTCS 32 weeks abruption      SECTION, TUBAL  LIGATION, COMBINED N/A 2023    Procedure:  SECTION, WITH possible POSTPARTUM TUBAL LIGATION;  Surgeon: Tea Carcamo MD;  Location: [facility] L+D     MOUTH SURGERY         Current Outpatient Medications   Medication     acetaminophen (TYLENOL) 325 MG tablet     hydrochlorothiazide (HYDRODIURIL) 12.5 MG tablet     ibuprofen (ADVIL/MOTRIN) 600 MG tablet     labetalol (NORMODYNE) 200 MG tablet     NIFEdipine ER (ADALAT CC) 90 MG 24 hr tablet     Prenatal Vit-Fe Fumarate-FA (PRENATAL MULTIVITAMIN W/IRON) 27-0.8 MG tablet     SENNA-docusate sodium (SENNA S) 8.6-50 MG tablet     No current facility-administered medications for this visit.          Allergies   Allergen Reactions     Hydrocodone-Acetaminophen Nausea and Vomiting       Vitals:    23 0906   BP: 115/72   Pulse: 78   Temp: 97  F (36.1  C)   Weight: 68 kg (150 lb)       Gen: well appearing, no distress  Abd: soft, nontender, nondistended  Incison: well approximated, clean, dry and intact, steristrips in place  Extremities: warm, well perfused, trace edema      ASSESSMENT:   38 year old  on POD 5 s/p RLTCS at 33w3d for pre-eclampsia with severe features on 3 antihypertensives. BPs normal, starting to feel hypotensive. Will taper off antihypertensives as able    PLAN:  1. Severe pre-eclampsia, postpartum    Check BP in the morning and evening before taking meds   Also check BP if you feel lightheaded or any other weird symptoms    Decrease labetalol to 600mg three times per day (3 tabs) --> then decrease to 600mg twice a day --> 400mg twice a day  If blood pressure is mostly normal the rest of today and tomorrow, then do not take the hydrochlorothiazide anymore    Send a message on Monday with your blood pressures and we can help make more adjustments as needed      Tea Carcamo MD

## 2023-07-18 ENCOUNTER — LACTATION ENCOUNTER (OUTPATIENT)
Age: 38
End: 2023-07-18

## 2023-07-18 ENCOUNTER — DOCUMENTATION ONLY (OUTPATIENT)
Dept: OBGYN | Facility: CLINIC | Age: 38
End: 2023-07-18
Payer: COMMERCIAL

## 2023-07-18 NOTE — LACTATION NOTE
This note was copied from a baby's chart.    Lactation Note    Baby and Family Information:  Infant's first name:  Sandra  Infant medical history: Born premature at 33w3d     needed? No    Lactation goal (if known): Breastfeed as long as possible     Mother's Name: Muna   Occupation:    Age: 38  Partner's name: Gabriel  Occupation:    [place]: [employer]  Delivery type:     Lactation history:  first child, Reymundo for 15months (now 8 years old); pumped and bottled for second child, Iván, who  last year at 4 years old (history of HIE)     Relevant maternal medical and social hx:       Information for the patient's mother:  WillieRosa Iselajosee ADRY GARCES [[id number]]     Patient Active Problem List   Diagnosis     HGSIL (high grade squamous intraepithelial dysplasia)     Vegetarian diet     Family history of thyroid disease in mother     High-risk pregnancy, unspecified trimester     Previous  delivery, antepartum condition or complication     History of placenta abruption     Preeclampsia, severe, third trimester          Relevant maternal medications:      Labetalol (Hale L2)  Nifedipine (Hale L2)   Hydrochlorathiazide (Hale L2)    Maternal risk factors:  Depression  Anxiety  Hypertension (details) Severe Pre-eclampsia  Placenta delivery complications  Placental Abruption     Equipment:  Hands-free pumping bra:  [x]yes purchased HFPB and belly band  []No  Nipple shield size:  [x]16mm  []20mm  []24mm  Pump type: Currently using [employer] Rental pump at home   Flange Size: 24 mm     Admission Education given:  [x]Kangaroo care  [x]Benefits of breast milk  [x]How breast milk is made  [x]Stages of milk production  [x]Milk supply/ goal volumes  [x]Hand expression  [x]Hands-on pumping  [x]Collecting, labeling, transporting milk  [x]Cleaning, sanitizing pump parts  [x]Storage of milk      Supply checks:  [x]5 day-- Logging     [x]5 day-- Hand expression     [x]5 day-- Hands-on pumping     [x]5  day-- Maintain setting     []10 day-- Water trick     []10 day-- 5 senses     []10 day-- Milk making reminder     []30 day-- Birth control plans?      []30 day-- Back to work plan?      []30 day-- Magic number     []30 day-- Deep freezer?          Handouts given:  [x]NICU admission packet     []Multiples  []Lecithin  []Massage  []Hypnosis  []Reglan  []Wellness center  []Discharge-- [facility] peer counselor  []Discharge-- signs of a good feeding  []Discharge-- [facility] milk storage  []Discharge-- First week feeding log  []Discharge-- After first week feeding log  []Discharge-- [place] lactation resources      Lactation Visits/ Health Team Rounds information:  Date Age LC Name Visit Details   07/10/23   1 day CATIE Martin, RN, IBCLC  Lactation admission    07/14/23 5 day REYNALDO Barry, IBCLC   Observed a first breastfeeding session with dyad. We discussed supportive hold, positioning, latch, breastfeeding patterns and infant driven feeding, breast support and compressions, use/rationale of the nipple shield, skin to skin benefits, and timing of pumpings around breastfeedings.  I fitted her with a 16mm shield and instructed her in its use.  Baby latched with wide gape, had a few nutritive suckles before transitioning to skin to skin holding. Muna is pumping every 2-3 hours for up to 2 full bottles on maintain setting. Discussed pumping on baby's feeding schedule every 3 hours during the day, 3-4 hours at night.        07/17/23   8 days  Belkys Cline, RN, IBCLC Called to the room to discuss pumping volumes.  Muna was worried that it seems to be less the last few times she's pumped.  Discussed stress/anxiety related to NICU stay due to history of past medical experience with son, Iván.  Pumpings are getting spaced out a little bit due to weights before/after feedings.  Blood pressure has been high so she's been trying to get more rest per her doctor's encouragement.  Encouraged her to use an alina to track number of  "pumping sessions/volume to see if any patterns emerge.  Also offered support and encouragement for the hard things that she's experiencing right now.  Muna knows she can call us back at any time with more questions.   07/18/23 9 days Amelia Farris RN, IBCLC   Called to room by RN to assist with BF and answer Sharon's questions regarding pumping/volumes. Sandra is at the breast at time of consult, positioning looks good and mother reports latch is generally comfortable but sometimes she feels it is shallow when Sandra is tired. Currently attempting to BF at 0600, 0900, 1200 and 1500.  Post-feeding weight showed a transfer of 16 mL. Mother shares concern that her milk volumes have dropped over the last few days from 40-60 mL to 15-30 mL. In this time she has been having issues with BP and adjustments to her medications. LC provides reassurance and reviews how stress, both physical and emotional, can effect pumping volumes. Encouraged rest and self care. Continuing to pump regularly using hands free pumping bra, reviewed Christine \"Maximizing Your Milk Supply\" video and how to access.  Pre-term feeding expectations and IDF reviewed as may transition to this soon.  Encouraged tracking pumping/feeding volumes to see overall trend of daily volumes expressed. Sharon will call LC with additional questions or needs for support.        Lactation Consultant Contact Information  Ascom: *[phone]  Office: [phone]        "

## 2023-07-25 ENCOUNTER — LACTATION ENCOUNTER (OUTPATIENT)
Age: 38
End: 2023-07-25

## 2023-07-25 NOTE — LACTATION NOTE
This note was copied from a baby's chart.  I met with mom for discharge teaching (see prior note for topics) and gave her pertinent handouts.  Teaching completed, all questions answered and she verbalizes readiness to go home.  Encouraged seeking outpatient support as needed.    Lactation discharge teaching completed 07/25/23  at 11:19 AM

## 2023-07-25 NOTE — LACTATION NOTE
"This note was copied from a baby's chart.  LACTATION DISCHARGE INSTRUCTIONS      Congratulations on your approaching discharge day!  Our goal is to help you have all the information, skills and equipment you need to help you meet your lactation goals at home.        CDC HANDOUT ON STORING AND PREPARING HUMAN MILK AT HOME    Please see attached handout   https://www.cdc.gov/breastfeeding/recommendations/handling_breastmilk.htm      FEEDING LOG: BABY'S FIRST WEEK, SECOND WEEK AND BEYOND    Please see attached feeding logs  Goal is to eat at least 8 times in 24 hours  Goal is to have at least 6 wet diapers in 24 hours  Talk to your provider about goal for soiled diapers.  Each baby is different depending on age and what they are eating      OTHER DISCHARGE INFORMATION    Medications:   Some women may find certain types of hormonal birth control, decongestants or antihistamines may impact supply-- talk to your provider.  Always get a second opinion from a lactation consultant or a provider familiar with lactation if told to stop latching or \"pump and dump\" when starting a new medication, having a procedure or you are ill; most of the time things are compatible.      TRANSITIONING TO MORE FEEDINGS AT HOME    Often, babies go home from the NICU doing a combination of breastfeeding and bottle feeding.  With time and patience, most will go on to nurse most or all their feedings.  infants, in particular, may not be able to fully nurse until at or after their due date. To ensure your baby is taking adequate volumes, some babies may need supplemental bottle after breastfeeding. Keep these things in mind as you nurse your baby at home:    Good time management is key!  Make feedings efficient so you have time to eat, sleep, and pump.    It is important to latch your baby frequently, even if he or she is taking small amounts. Staying skin to skin will also help keep your baby \"breast oriented\".  Going days without latching " will make it more difficult.  Babies can be re-taught how to latch, but this is very time consuming and not always successful.        Please see a lactation consultant ASAP if you are not meeting your latching goal.  It is easier to make changes now, versus weeks or months down the road.      HOW TO WEAN FROM THE NIPPLE SHIELD    Many NICU babies use a nipple shield for a period of time, especially premature babies and babies recovering from illness or surgery.  It helps them stay latched on and get milk more easily.    How do you know it's time to try off the nipple shield?    Your baby is waking on their own before feedings  Your baby is able to stay awake during the entire feeding, without a lot of encouragement to stay awake  Your baby's suck is significantly stronger   Your baby is taking full feedings at the breast  Typically, at or after their due date    How do I wean off the nipple shield?    Start the feeding with the nipple shield in place, then take the nipple shield off skilled nursing through the feeding and re-latch  Try at feedings where your baby is calm, not when they are frantically hungry  Middle of the night can be a good time to try, when everyone is relaxed  https://www.Fleecs.Transcepta/blog/my-ten-step-process-for-weaning-from-the-nipple-shield/    How do I know my baby is eating well without the nipple shield?    They seem satisfied after feeding  Your breasts feels softer after the feeding  Your baby has enough wet and soiled diapers  If using a breastfeeding scale-- the numbers on the scale are similar to a feeding with a nipple shield  If you have problems getting off the nipple shield, please make an appointment with a lactation consultant.      HOW TO WEAN FROM THE PUMP (AFTER YOUR BABY TAKES A FULL BREASTFEEDING)    Your milk supply may be greater than what your baby needs after discharge. It is important that you gradually wean from pumping after your baby takes a full breastfeeding  "(without needing a top-off).  If you wean too quickly, you will be uncomfortable and you run the risk of causing your supply to drop.    If you have been pumping less than two weeks:    If you are uncomfortable after a full breastfeeding, pump only until you are comfortable (versus pumping until empty)      If you have been pumping two weeks or more:    Continue to pump after every breastfeeding, but gradually decrease the time or volume you pump.   Example based on time: If you have been pumping 20 minutes after each full breastfeeding, decrease to 18 minutes for two days. If still comfortable, decrease to 16 minutes for another two days.   Example based on volume: If you normally pump 2 oz after a feeding, pump 1.75 oz for a few days, 1.5 oz for a few days, etc  Continue this way until you no longer need to pump (after breastfeeding).    Remember that if you are bottle feeding some feedings, you need to pump at the time you would have latched your baby. If you do not, you might start decreasing your milk supply.        OTHER LATCHING INFORMATION    Growth Spurts: Common times for \"growth spurts\" are around 7-10 days, 2-3 weeks, 4-6 weeks, 3 months, 4 months, 6 months and 9 months, but these vary widely between babies.  During these times allow your baby to nurse very frequently (or pump more frequently) to temporarily boost your supply, as opposed to supplementing.  It should pass in a few days when your supply increases, and your baby will settle into a new feeding pattern.    How to get a breastfeeding test weight scale:   Rental (2ml sensitivity):   [facility] ([facility]) [phone]   [facility] ([facility]) [phone]  [place] [facility]) [phone]   Purchase scale (6ml sensitivity):   \"Torres Baby Scale\" (Effektif, Amazon, etc), around $150      LACTATION SUPPORT    [place] Lactation Resources:   CLAYTON Hitchcock, CNM, IBCLC  Tuesday:  [facility],  8:30 - 5:00, " " [phone]  Wednesday:  [facility], 7th floor, 8:30 - 4:00, [phone]  Thursday:  [facility], [place], 8:30 - 4:00,  [phone]    Breastfeeding Connection at [facility]  [phone]   Breastfeeding Connection at [facility]   [phone]  [facility]    [phone]  [facility]      [phone]  [facility]      [phone]  [facility]      [phone]    [facility]       [phone]  [facility]       [phone]      Other Lactation Help:  [facility]/ [place] (Tues/Wed)     [phone]  [place] Baby Weigh In (various times and locations)    www.[employer] ++HAS VIRTUAL SUPPORT++   Enlightened Mama   www.[employer] [phone]  Everyday Miracles         https://www.everyday-miracles.org/  [facility]     [phone] ++HAS VIRTUAL SUPPORT++   Shweta Segura DO, MPH, [facility], IBCLC  Integrative Family Medicine Physician/Breastfeeding Medicine/ Home visits  www.[employer]  [phone]  [facility] \"Well Fed\" postpartum group ([facility])   [phone]  Support in other languages:  [place] citizen:  Akanksha (IBCLC/ [place] citizen) [phone]  [email or web address].  La Leche League: Si quieres ayuda en espanol con rommel pecho por favor llama Xuan al [phone].  Emani [facility] [place]  For more information call [facility] [phone] or Jigna at [phone] ([place]) or Deneen Islas at [phone] ([place]).  [place]: Fridays, 10:30 am to noon. [facility]-[address] [place] [address]: Wednesdays, 1:00-2:00 PM. [facility], [address] [place] [address]  Hansel (Hmong) [phone]  Kashif (Kyrgyz) [phone]   breastfeeding support:  [facility] [place] " "([place])     www.rootsbirthcenter.[employer]  [phone]  Chocolate Milk Club:    http://www.[address]oreselsmidw[address].com/chocolate-milk-club/  Dr. Antonietta Ogden, [phone]  DIVA Moms (Dynamic Involved Valued  Moms)   [phone]  DearLocal  [phone] or [email or web address]  https://www.mobME Solutions.com/Blackfamiliesdobreastfeed  [place] Breastfeeding Coalition:  See Facebook site  [email or web address] [place]ie [place] [phone]  [place] Indigenous Breastfeeding [place]      See Facebook site or Google \"Bj Eze\"  [email or web address]  Tiffani Hsieh [phone]   Violeta Raygoza [email or web address]         Telephone and Online Support    [facility] ++HAS VIRTUAL SUPPORT++ (call for eligibility information)   [phone]    BabyCafes (www.babycafeusa.org) (now in person)    La Leche League International   ++HAS VIRTUAL SUPPORT++  www.llli.org  8-150-7-LA-LECHE ([phone])  Local referral line [phone]  Si quieres ayuda en espanol con rommel pecho por favor llCanton Xuan al [phone].    [place]-- up to date lactation information  Www.GroupSpacesmoKwan Mobile.com    International Breastfeeding [place] ([place])  Http://ibconline.ca/    The InfantRisk Call Center is available to answer questions about the use of medications during pregnancy and while breastfeeding  [phone]  www.infant3PointData.com     Office on Women's Health National Breastfeeding Help Line  8am to 5pm, English and Central African 1-564.962.1516 option 1    https://www.womenshealth.gov/breastfeeding/ Htse6Ypgw Alina (free on [employer] alina store or Google Play)    LactMed Alina (free on iPhone alina store or Google Play) LactMed is available online at https://toxnet.nlm.nih.gov/newtoxnet/lactmed.htm and is now available on your mobile device. The free LactMed Alina for iPhone/iPod Touch and Android can be downloaded at http://toxnet.nlm.nih.gov/help/lactmedapp.htm.               "

## 2023-08-21 ENCOUNTER — PRENATAL OFFICE VISIT (OUTPATIENT)
Dept: OBGYN | Facility: CLINIC | Age: 38
End: 2023-08-21
Payer: COMMERCIAL

## 2023-08-21 VITALS
BODY MASS INDEX: 22.24 KG/M2 | WEIGHT: 139.9 LBS | SYSTOLIC BLOOD PRESSURE: 129 MMHG | HEART RATE: 89 BPM | DIASTOLIC BLOOD PRESSURE: 80 MMHG | OXYGEN SATURATION: 99 %

## 2023-08-21 PROBLEM — O14.13 PREECLAMPSIA, SEVERE, THIRD TRIMESTER: Status: RESOLVED | Noted: 2023-07-06 | Resolved: 2023-08-21

## 2023-08-21 PROBLEM — O34.219 PREVIOUS CESAREAN DELIVERY, ANTEPARTUM CONDITION OR COMPLICATION: Status: RESOLVED | Noted: 2023-01-20 | Resolved: 2023-08-21

## 2023-08-21 PROBLEM — O09.90 HIGH-RISK PREGNANCY, UNSPECIFIED TRIMESTER: Status: RESOLVED | Noted: 2023-01-20 | Resolved: 2023-08-21

## 2023-08-21 PROBLEM — O14.90 PREECLAMPSIA: Status: RESOLVED | Noted: 2023-07-12 | Resolved: 2023-08-21

## 2023-08-21 PROCEDURE — 99207 PR POST PARTUM EXAM: CPT | Performed by: OBSTETRICS & GYNECOLOGY

## 2023-08-21 ASSESSMENT — ANXIETY QUESTIONNAIRES
2. NOT BEING ABLE TO STOP OR CONTROL WORRYING: SEVERAL DAYS
IF YOU CHECKED OFF ANY PROBLEMS ON THIS QUESTIONNAIRE, HOW DIFFICULT HAVE THESE PROBLEMS MADE IT FOR YOU TO DO YOUR WORK, TAKE CARE OF THINGS AT HOME, OR GET ALONG WITH OTHER PEOPLE: SOMEWHAT DIFFICULT
6. BECOMING EASILY ANNOYED OR IRRITABLE: NOT AT ALL
3. WORRYING TOO MUCH ABOUT DIFFERENT THINGS: SEVERAL DAYS
1. FEELING NERVOUS, ANXIOUS, OR ON EDGE: SEVERAL DAYS
7. FEELING AFRAID AS IF SOMETHING AWFUL MIGHT HAPPEN: SEVERAL DAYS
GAD7 TOTAL SCORE: 4
GAD7 TOTAL SCORE: 4
5. BEING SO RESTLESS THAT IT IS HARD TO SIT STILL: NOT AT ALL

## 2023-08-21 ASSESSMENT — PATIENT HEALTH QUESTIONNAIRE - PHQ9
SUM OF ALL RESPONSES TO PHQ QUESTIONS 1-9: 1
5. POOR APPETITE OR OVEREATING: NOT AT ALL

## 2023-08-21 NOTE — PROGRESS NOTES
Muna RIDER is here for a 6-week postpartum checkup.    She had a repeat c/s of a viable girl, weight 4 pounds 0 oz., at 33w3d after admission for preeclampsia with severe features.  Since delivery, she has been breast feeding.  She has No signs of infection, bleeding or other complications.  She is not pregnant.  We discussed contraceptions and she has chosen vasectomy and condoms.      She was discharged on nifedipine, labetalol and hydrochlorothiazide.  She has stopped all of her medication as of last week.  She did have an allergic reaction to nifedipine with development of full body rash/hives that immediately improved when she stopped.  Her mood is good, she has had some triggers (like NICU) from her previous birth but overall doing ok and working with her therapist.        8/21/2023     1:33 PM   PHQ   PHQ-9 Total Score 1   Q9: Thoughts of better off dead/self-harm past 2 weeks Not at all         EXAM:    HEENT: grossly normal.  NECK: no lymphadenopathy or thyroidomegaly.  LUNGS: CTA X 2, no rales or crackles.  BACK: No spinal or CVA tenderness.  HEART: RRR without murmurs clicks or gallops.  ABDOMEN: soft, non tender, good bowel sounds, without masses rebound, guarding or tenderness. Incision well healed.    PELVIC:    deferred    EXTREMITIES: Warm to touch, good pulses, no ankle edema or calf tenderness.  NEUROLOGIC: grossly normal.    ASSESSMENT:   Normal 6-week postpartum exam after repeat c/s. Severe pre-e    PLAN:  Pap smear utd and vasectomy and condoms for contraception.  BPs stable, no medication needed, can cont to monitor.  Ok to resume normal activities.  RTC yearly or prn.    ALEX ROACH MD

## 2023-09-11 ENCOUNTER — MEDICAL CORRESPONDENCE (OUTPATIENT)
Dept: HEALTH INFORMATION MANAGEMENT | Facility: CLINIC | Age: 38
End: 2023-09-11
Payer: COMMERCIAL

## 2023-09-28 LAB — SCANNED LAB RESULT: ABNORMAL

## 2023-09-29 ENCOUNTER — TELEPHONE (OUTPATIENT)
Dept: MATERNAL FETAL MEDICINE | Facility: CLINIC | Age: 38
End: 2023-09-29
Payer: COMMERCIAL

## 2023-09-29 NOTE — TELEPHONE ENCOUNTER
"2023    I left a message for Muna to discuss an update to her carrier screening that was previously reported to be negative.     We discussed that every so often the lab gets new information about a specific variant that they previously called \"uncertain\". Those results are not reported on carrier screening because they are not considered clinically actionable in pregnancy. 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    CFTR-related disorders (c.2249C>T):  CFTR-related conditions encompass a spectrum of disorders that typically impact the respiratory and/or digestive systems, and cause male infertility.   The most severe CFTR condition is cystic fibrosis (CF). CF is typically a childhood-onset condition in which abnormally thick mucus production can cause respiratory infections, lung disease, gastrointestinal problems, and pancreatic insufficiency.   Individuals who carry a pathogenic CFTR condition may be at increased risk for chronic pancreatitis compared to the general population (risk is <1%).   Muna's partner, Gabriel, has not had carrier screening. The carrier frequency for this condition is approximately 1 in 45 in the general population. Thus, the current reproductive risk is up to 1 in 180.    Muna's daughter, Sandra, and her son, Reymundo, both had negative  screening.     I encouraged her to call me with questions.    Abimbola Olivares MS, [facility]  Licensed Genetic Counselor  [facility]  Maternal Fetal Medicine  [email or web address]  [phone]  "

## 2023-11-10 ENCOUNTER — MEDICAL CORRESPONDENCE (OUTPATIENT)
Dept: HEALTH INFORMATION MANAGEMENT | Facility: CLINIC | Age: 38
End: 2023-11-10
Payer: COMMERCIAL

## 2023-11-14 DIAGNOSIS — N61.0 MASTITIS: Primary | ICD-10-CM

## 2023-11-14 RX ORDER — DICLOXACILLIN SODIUM 500 MG
500 CAPSULE ORAL 4 TIMES DAILY
Qty: 28 CAPSULE | Refills: 0 | Status: SHIPPED | OUTPATIENT
Start: 2023-11-14 | End: 2023-11-21

## 2024-04-07 ENCOUNTER — HEALTH MAINTENANCE LETTER (OUTPATIENT)
Age: 39
End: 2024-04-07

## 2024-06-25 ENCOUNTER — ANCILLARY PROCEDURE (OUTPATIENT)
Dept: GENERAL RADIOLOGY | Facility: CLINIC | Age: 39
End: 2024-06-25
Attending: FAMILY MEDICINE
Payer: COMMERCIAL

## 2024-06-25 ENCOUNTER — OFFICE VISIT (OUTPATIENT)
Dept: URGENT CARE | Facility: URGENT CARE | Age: 39
End: 2024-06-25
Payer: COMMERCIAL

## 2024-06-25 VITALS
OXYGEN SATURATION: 99 % | DIASTOLIC BLOOD PRESSURE: 85 MMHG | WEIGHT: 130 LBS | HEART RATE: 88 BPM | SYSTOLIC BLOOD PRESSURE: 125 MMHG | TEMPERATURE: 98.2 F | BODY MASS INDEX: 20.4 KG/M2 | HEIGHT: 67 IN

## 2024-06-25 DIAGNOSIS — M79.671 RIGHT FOOT PAIN: ICD-10-CM

## 2024-06-25 DIAGNOSIS — S93.401A SPRAIN OF RIGHT ANKLE, UNSPECIFIED LIGAMENT, INITIAL ENCOUNTER: ICD-10-CM

## 2024-06-25 DIAGNOSIS — M25.571 ACUTE RIGHT ANKLE PAIN: ICD-10-CM
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
## 2024-06-25 PROCEDURE — 73610 X-RAY EXAM OF ANKLE: CPT | Mod: TC | Performed by: RADIOLOGY

## 2024-06-25 PROCEDURE — 73630 X-RAY EXAM OF FOOT: CPT | Mod: TC | Performed by: RADIOLOGY

## 2024-06-25 PROCEDURE — 99213 OFFICE O/P EST LOW 20 MIN: CPT | Performed by: FAMILY MEDICINE

## 2024-06-26 ENCOUNTER — OFFICE VISIT (OUTPATIENT)
Dept: ORTHOPEDICS | Facility: CLINIC | Age: 39
End: 2024-06-26
Attending: FAMILY MEDICINE
Payer: COMMERCIAL

## 2024-06-26 VITALS — WEIGHT: 130 LBS | HEIGHT: 67 IN | BODY MASS INDEX: 20.4 KG/M2

## 2024-06-26 DIAGNOSIS — S93.401A SPRAIN OF RIGHT ANKLE, UNSPECIFIED LIGAMENT, INITIAL ENCOUNTER: Primary | ICD-10-CM

## 2024-06-26 PROCEDURE — 99203 OFFICE O/P NEW LOW 30 MIN: CPT | Performed by: PHYSICIAN ASSISTANT

## 2024-06-26 NOTE — LETTER
6/26/2024      Muna Tapia  [address]  [place] [address]      Dear Colleague,    Thank you for referring your patient, Muna Tapia, to the [facility]. Please see a copy of my visit note below.    ASSESSMENT & PLAN       Today we discussed the underlying etiology/pathology of patient's   1. Sprain of right ankle,  initial encounter      -We discussed today the patient sustained an acute ankle injury 1 day ago causing a inversion type mechanism.  X-rays do not show any fracture of the distal fibula or tibia.  There is an old ossicle noted off the posterior talus which is likely chronic and unrelated to current injury.  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  She would like to go to rehab for her current right ankle injury.  PT order placed and she may work on scheduling appointments at a mutual time  - Patient understood mainstay of treatment is rest, ice, compression and elevation.  Patient is able to weight-bear in her short cam walker boot with some mild discomfort.  Crutches to be utilized as needed.  - We discussed the patient may transition out of the cam walker boot as pain allows and move into an ankle brace either a Tri-Lock or a lace up Raptor which she is comfortable purchasing over-the-counter.  - We discussed the nature of soft tissue injuries that may range anywhere form 2-to 10 weeks to recover but due to her history of ankle injuries in the past her recovery may be a little slower this time.  - Patient understands she may follow-up as needed for this injury as again there is no active acute radiographic evidence of fracture that needs to be monitored closely.    -Call direct clinic number [[phone]] at any time with questions or concerns in regards to your recent office visit with me.     Herman Small PA-C  [facility]    This note  was completed in part using a voice recognition software, any grammatical or context distortion are unintentional and inherent to the software.         SUBJECTIVE  Muna Tapia is a/an 39 year old female who is seen in consultation at the request of  Bruna Martínez M.D. for evaluation of right ankle and foot injury. The patient is seen by themselves.  DOI: 6/25/24    Onset: 1 day(s) ago. Patient describes injury as carrying several items and child at once, fell off the curb and into a car.   Location of Pain: lateral posterior and anterior of the lateral malleolus; mild numbness/tingling in the lateral ankle and the lateral lower leg; no radiation of pain  Rating of Pain at worst: 8/10  Rating of Pain Currently: 5/10  Worsened by: walking, standing, WB in general, dorsiflexion > plantarflexion, mild with inversion/eversion and IR and ER  Better with: mild with treatments  Treatments tried: rest/activity avoidance, elevation, ice, and Advil, CAM Walker Boot, crutches,   Quality: aching, dull, tingling  Associated symptoms: weakness of the ankle/foot with movement and hypomobility, swelling, bruising  Orthopedic history: YES - Date: 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  Relevant surgical history: NO  Social history: contract work PRN; current grad-school student; yoga, exercising daily; substitute teaching occasionally    Past Medical History:   Diagnosis Date     History of blood transfusion      Preeclampsia, severe, third trimester 7/6/2023     Sprain of ankle      Varicella      Social History     Socioeconomic History     Marital status:      Number of children: 2     Years of education: Post Grad   Tobacco Use     Smoking status: Never     Smokeless tobacco: Never   Vaping Use     Vaping status: Never Used   Substance and Sexual Activity     Alcohol use: No     Drug use: No     Sexual activity: Yes     Partners: Male         Patient's past medical, surgical, social, and family  histories were personally reviewed today and no changes are noted.    REVIEW OF SYSTEMS:  10 point ROS is negative other than symptoms noted above in HPI, Past Medical History or as stated below  Constitutional: NEGATIVE for fever, chills, change in weight  Skin: NEGATIVE for worrisome rashes, moles or lesions  GI/: NEGATIVE for bowel or bladder changes  Neuro: NEGATIVE for weakness, dizziness or paresthesias    OBJECTIVE:  Vital signs as noted in EPIC for 6/26/2024  General: healthy, alert and in no distress  HEENT: no scleral icterus or conjunctival erythema  Skin: no suspicious lesions or rash. No jaundice.  CV: no pedal edema  Resp: normal respiratory effort without conversational dyspnea   Psych: normal mood and affect  Gait: normal steady gait with appropriate coordination and balance  Neuro: Normal light sensory exam of lower extremity      MSK:  Exam shows a pleasant 39-year-old female who presents today with crutches guarded weightbearing using a short cam boot on the right lower extremity.  Her  attended the appointment at the end of the visit today.  With shoe and sock removed there is soft tissue swelling noted around the lateral malleoli.  No previous surgical incisions.  No pain at the level of the knee to palpation with negative symptomatic squeeze test.  Patient is tender over the distal fibula bone to palpation with mild tenderness over the lateral ankle ligaments.  Very slight tenderness noted over the medial deltoid ligament but no pain over the bony prominences of the medial malleoli.  Mulligan sign negative.  No pain throughout the gastroc/soleus complex or the Achilles tendon.  Patient has normal motor tone with plantarflexion and dorsiflexion against resistance without pain as well as extensor hallux longus intact without pain.  Mild discomfort with inversion passively of the ankle but subtalar joint is within normal range of motion.  Anterior drawer testing generates pain in the  ankle laterally.  No pain throughout the midfoot remaining hindfoot or forefoot.  +2 radial pulses.  Patient is neurovascular intact L2-S1 bilaterally.            Personal Independent visualization of the below images done today:  Previous x-rays of the patient's ankle and foot are personally reviewed and reviewed with the patient today.  There is no evidence of acute fracture of the distal tibia or fibula.  Soft tissue swelling noted laterally.  Old bony ossicle noted off the posterior talus.    EXAM: XR FOOT RIGHT G/E 3 VIEWS, XR ANKLE RIGHT G/E 3 VIEWS  LOCATION: [facility] [place]  DATE: 6/25/2024     INDICATION:  Fall, initial encounter, Right foot pain  COMPARISON: None.                                                                    IMPRESSION: Normal joint spaces and alignment. No fracture. Mild soft tissue swelling along the lateral aspect of the ankle.    Patient's conditions were thoroughly discussed during today's visit with total time reviewing patient's previous medical records/history/radiology, face-to-face examination and discussion and plan of care with the patient and documentation being 30 minutes for today's clinical visit  Herman Small PA-C  [facility]    This note was completed in part using a voice recognition software, any grammatical or context distortion are unintentional and inherent to the software.       Again, thank you for allowing me to participate in the care of your patient.        Sincerely,        Herman Small PA-C

## 2024-06-26 NOTE — PROGRESS NOTES
ASSESSMENT & PLAN       Today we discussed the underlying etiology/pathology of patient's   1. Sprain of right ankle,  initial encounter      -We discussed today the patient sustained an acute ankle injury 1 day ago causing a inversion type mechanism.  X-rays do not show any fracture of the distal fibula or tibia.  There is an old ossicle noted off the posterior talus which is likely chronic and unrelated to current injury.  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  She would like to go to rehab for her current right ankle injury.  PT order placed and she may work on scheduling appointments at a mutual time  - Patient understood mainstay of treatment is rest, ice, compression and elevation.  Patient is able to weight-bear in her short cam walker boot with some mild discomfort.  Crutches to be utilized as needed.  - We discussed the patient may transition out of the cam walker boot as pain allows and move into an ankle brace either a Tri-Lock or a lace up Raptor which she is comfortable purchasing over-the-counter.  - We discussed the nature of soft tissue injuries that may range anywhere form 2-to 10 weeks to recover but due to her history of ankle injuries in the past her recovery may be a little slower this time.  - Patient understands she may follow-up as needed for this injury as again there is no active acute radiographic evidence of fracture that needs to be monitored closely.    -Call direct clinic number [[phone]] at any time with questions or concerns in regards to your recent office visit with me.     Herman Small PA-C  [facility]    This note was completed in part using a voice recognition software, any grammatical or context distortion are unintentional and inherent to the software.         SUBJECTIVE  Muna Tapia is a/an 39 year old female who is seen in consultation at the request of   Bruna Martínez M.D. for evaluation of right ankle and foot injury. The patient is seen by themselves.  DOI: 6/25/24    Onset: 1 day(s) ago. Patient describes injury as carrying several items and child at once, fell off the curb and into a car.   Location of Pain: lateral posterior and anterior of the lateral malleolus; mild numbness/tingling in the lateral ankle and the lateral lower leg; no radiation of pain  Rating of Pain at worst: 8/10  Rating of Pain Currently: 5/10  Worsened by: walking, standing, WB in general, dorsiflexion > plantarflexion, mild with inversion/eversion and IR and ER  Better with: mild with treatments  Treatments tried: rest/activity avoidance, elevation, ice, and Advil, CAM Walker Boot, crutches,   Quality: aching, dull, tingling  Associated symptoms: weakness of the ankle/foot with movement and hypomobility, swelling, bruising  Orthopedic history: YES - Date: 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  Relevant surgical history: NO  Social history: contract work PRN; current grad-school student; yoga, exercising daily; substitute teaching occasionally    Past Medical History:   Diagnosis Date    History of blood transfusion     Preeclampsia, severe, third trimester 7/6/2023    Sprain of ankle     Varicella      Social History     Socioeconomic History    Marital status:     Number of children: 2    Years of education: Post Grad   Tobacco Use    Smoking status: Never    Smokeless tobacco: Never   Vaping Use    Vaping status: Never Used   Substance and Sexual Activity    Alcohol use: No    Drug use: No    Sexual activity: Yes     Partners: Male         Patient's past medical, surgical, social, and family histories were personally reviewed today and no changes are noted.    REVIEW OF SYSTEMS:  10 point ROS is negative other than symptoms noted above in HPI, Past Medical History or as stated below  Constitutional: NEGATIVE for fever, chills, change in weight  Skin: NEGATIVE for  worrisome rashes, moles or lesions  GI/: NEGATIVE for bowel or bladder changes  Neuro: NEGATIVE for weakness, dizziness or paresthesias    OBJECTIVE:  Vital signs as noted in EPIC for 6/26/2024  General: healthy, alert and in no distress  HEENT: no scleral icterus or conjunctival erythema  Skin: no suspicious lesions or rash. No jaundice.  CV: no pedal edema  Resp: normal respiratory effort without conversational dyspnea   Psych: normal mood and affect  Gait: normal steady gait with appropriate coordination and balance  Neuro: Normal light sensory exam of lower extremity      MSK:  Exam shows a pleasant 39-year-old female who presents today with crutches guarded weightbearing using a short cam boot on the right lower extremity.  Her  attended the appointment at the end of the visit today.  With shoe and sock removed there is soft tissue swelling noted around the lateral malleoli.  No previous surgical incisions.  No pain at the level of the knee to palpation with negative symptomatic squeeze test.  Patient is tender over the distal fibula bone to palpation with mild tenderness over the lateral ankle ligaments.  Very slight tenderness noted over the medial deltoid ligament but no pain over the bony prominences of the medial malleoli.  Mulligan sign negative.  No pain throughout the gastroc/soleus complex or the Achilles tendon.  Patient has normal motor tone with plantarflexion and dorsiflexion against resistance without pain as well as extensor hallux longus intact without pain.  Mild discomfort with inversion passively of the ankle but subtalar joint is within normal range of motion.  Anterior drawer testing generates pain in the ankle laterally.  No pain throughout the midfoot remaining hindfoot or forefoot.  +2 radial pulses.  Patient is neurovascular intact L2-S1 bilaterally.            Personal Independent visualization of the below images done today:  Previous x-rays of the patient's ankle and foot  are personally reviewed and reviewed with the patient today.  There is no evidence of acute fracture of the distal tibia or fibula.  Soft tissue swelling noted laterally.  Old bony ossicle noted off the posterior talus.    EXAM: XR FOOT RIGHT G/E 3 VIEWS, XR ANKLE RIGHT G/E 3 VIEWS  LOCATION: [facility] [place]  DATE: 6/25/2024     INDICATION:  Fall, initial encounter, Right foot pain  COMPARISON: None.                                                                    IMPRESSION: Normal joint spaces and alignment. No fracture. Mild soft tissue swelling along the lateral aspect of the ankle.    Patient's conditions were thoroughly discussed during today's visit with total time reviewing patient's previous medical records/history/radiology, face-to-face examination and discussion and plan of care with the patient and documentation being 30 minutes for today's clinical visit  Herman Small PA-C  [facility]    This note was completed in part using a voice recognition software, any grammatical or context distortion are unintentional and inherent to the software.

## 2024-06-26 NOTE — PROGRESS NOTES
Chief Complaint   Patient presents with    Urgent Care    Musculoskeletal Problem     Pt in clinic to have eval for right ankle pain due to fall today.     Muna RIDER was seen today for urgent care and musculoskeletal problem.    Diagnoses and all orders for this visit:    Fall, initial encounter  -     XR Foot Right G/E 3 Views  -     XR Ankle Right G/E 3 Views    Right foot pain  -     XR Foot Right G/E 3 Views    Acute right ankle pain  -     Ankle/Foot Bracing Supplies Order Walking Boot; Right; Pneumatic; Short    Sprain of right ankle, unspecified ligament, initial encounter  -     Orthopedic  Referral; Future    D/d-sprain/fracture/ligament injury    Discussed with patient that the pain and the swelling would recommend using a cam walker.  Continue elevation ice and NSAID to help with the pain.  Also referral to Ortho done in case symptoms do not get better.  Discussed about the chipped bone noted on the x-ray which could be from previous injury or could be from the recent injury but has still surface looks very small and seems to be related to past injury.      SUBJECTIVE:  Muna Tapia is a 39 year old female who complains of inversion injury to the right ankle injury hours ago. This happened after a fall today Immediate symptoms: immediate pain, delayed pain, immediate swelling. Symptoms have been gradual since that time. Prior history of related problems: no prior problems with this area in the past. There is pain and swelling at the lateral aspect of that ankle.   She also has pain in the foot , she has been using crutches to help with the pain    OBJECTIVE:  She appears well, vital signs are normal. There is swelling and tenderness over the lateral malleolus. No tenderness over the medial aspect of the ankle. The fifth metatarsal is not tender. The ankle joint is intact without excessive opening on stressing. X-ray: no fracture or dislocation noted though a small chipped bone noted in the  inferior aspect of the lateral malleolus  The rest of the foot, ankle and leg exam is normal.called patient left message in secure voice mail  and notified about xray result       Bruna Martínez MD

## 2024-06-26 NOTE — PATIENT INSTRUCTIONS
Today we discussed the underlying etiology/pathology of patient's   1. Sprain of right ankle,  initial encounter      -We discussed today the patient sustained an acute ankle injury 1 day ago causing a inversion type mechanism.  X-rays do not show any fracture of the distal fibula or tibia.  There is an old ossicle noted off the posterior talus which is likely chronic and unrelated to current injury.  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  She would like to go to rehab for her current right ankle injury.  PT order placed and she may work on scheduling appointments at a mutual time  - Patient understood mainstay of treatment is rest, ice, compression and elevation.  Patient is able to weight-bear in her short cam walker boot with some mild discomfort.  Crutches to be utilized as needed.  - We discussed the patient may transition out of the cam walker boot as pain allows and move into an ankle brace either a Tri-Lock or a lace up Raptor which she is comfortable purchasing over-the-counter.  - We discussed the nature of soft tissue injuries that may range anywhere form 2-to 10 weeks to recover but due to her history of ankle injuries in the past her recovery may be a little slower this time.  - Patient understands she may follow-up as needed for this injury as again there is no active acute radiographic evidence of fracture that needs to be monitored closely.    -Call direct clinic number [[phone]] at any time with questions or concerns in regards to your recent office visit with me.     Herman Small PA-C  [facility]    This note was completed in part using a voice recognition software, any grammatical or context distortion are unintentional and inherent to the software.

## 2024-08-25 ENCOUNTER — HEALTH MAINTENANCE LETTER (OUTPATIENT)
Age: 39
End: 2024-08-25

## 2024-09-25 ENCOUNTER — LAB REQUISITION (OUTPATIENT)
Dept: LAB | Facility: CLINIC | Age: 39
End: 2024-09-25
Payer: COMMERCIAL

## 2024-09-25 DIAGNOSIS — L30.8 OTHER SPECIFIED DERMATITIS: ICD-10-CM

## 2024-09-25 PROCEDURE — 87107 FUNGI IDENTIFICATION MOLD: CPT | Mod: ORL | Performed by: PHYSICIAN ASSISTANT

## 2024-10-23 LAB — BACTERIA SPEC CULT: ABNORMAL

## 2024-11-05 ENCOUNTER — ANCILLARY PROCEDURE (OUTPATIENT)
Dept: MAMMOGRAPHY | Facility: CLINIC | Age: 39
End: 2024-11-05
Payer: COMMERCIAL

## 2024-11-05 DIAGNOSIS — Z12.31 VISIT FOR SCREENING MAMMOGRAM: ICD-10-CM

## 2024-11-05 PROCEDURE — 77067 SCR MAMMO BI INCL CAD: CPT | Mod: GC | Performed by: STUDENT IN AN ORGANIZED HEALTH CARE EDUCATION/TRAINING PROGRAM

## 2024-11-05 PROCEDURE — 77063 BREAST TOMOSYNTHESIS BI: CPT | Mod: GC | Performed by: STUDENT IN AN ORGANIZED HEALTH CARE EDUCATION/TRAINING PROGRAM

## 2025-04-30 ENCOUNTER — OFFICE VISIT (OUTPATIENT)
Dept: OBGYN | Facility: CLINIC | Age: 40
End: 2025-04-30
Payer: COMMERCIAL

## 2025-04-30 VITALS
SYSTOLIC BLOOD PRESSURE: 116 MMHG | HEIGHT: 67 IN | TEMPERATURE: 98 F | DIASTOLIC BLOOD PRESSURE: 79 MMHG | OXYGEN SATURATION: 100 % | HEART RATE: 78 BPM | WEIGHT: 132.8 LBS | BODY MASS INDEX: 20.84 KG/M2

## 2025-04-30 DIAGNOSIS — N93.0 PCB (POST COITAL BLEEDING): ICD-10-CM

## 2025-04-30 DIAGNOSIS — Z00.00 PREVENTATIVE HEALTH CARE: ICD-10-CM

## 2025-04-30 DIAGNOSIS — L76.82 PAIN AT SURGICAL INCISION: ICD-10-CM

## 2025-04-30 DIAGNOSIS — Z01.419 WELL WOMAN EXAM WITH ROUTINE GYNECOLOGICAL EXAM: Primary | ICD-10-CM

## 2025-04-30 LAB
CHOLEST SERPL-MCNC: 178 MG/DL
EST. AVERAGE GLUCOSE BLD GHB EST-MCNC: 94 MG/DL
FASTING STATUS PATIENT QL REPORTED: NO
HBA1C MFR BLD: 4.9 % (ref 0–5.6)
HDLC SERPL-MCNC: 65 MG/DL
LDLC SERPL CALC-MCNC: 104 MG/DL
NONHDLC SERPL-MCNC: 113 MG/DL
TRIGL SERPL-MCNC: 44 MG/DL
TSH SERPL DL<=0.005 MIU/L-ACNC: 1.79 UIU/ML (ref 0.3–4.2)

## 2025-04-30 ASSESSMENT — PATIENT HEALTH QUESTIONNAIRE - PHQ9: SUM OF ALL RESPONSES TO PHQ QUESTIONS 1-9: 1

## 2025-04-30 NOTE — PROGRESS NOTES
"Muna RIDER is a 40 year old  female who presents for annual exam.     Menses are regular q 28-30 days and variable lasting 3 days.  Menses flow: light.  Patient's last menstrual period was 2025 (exact date).. Using none for contraception.  She is not currently considering pregnancy.  Besides routine health maintenance,  she would like to discuss spotting after intercourse.  It has only happened few times, not regularly, but wondering what that means, if anythng?  She also reports that after completing breastfeeding, her periods were very heavy, but over the past 4-6 months, they have lightened considerably.  She does still have some pain and pulling at her left incision edge with certain exercises.  It is painful enough it does cause her to modify what she is doing.  GYNECOLOGIC HISTORY:  Menarche: 10  Age at first intercourse: 16 Number of lifetime partners: <5  Muna RIDER is sexually active with 1 male partner(s) and is currently in monogamous relationship with .    History sexually transmitted infections:HPV  STI testing offered?  Declined  HARMAN exposure: Unknown  History of abnormal Pap smear: YES - reflected in Problem List and Health Maintenance accordingly  Family history of breast CA: Yes (Please explain): pat aunt  Family history of uterine/ovarian CA: No    Family history of colon CA: No    HEALTH MAINTENANCE:  Cholesterol: (  Cholesterol   Date Value Ref Range Status   2022 170 <200 mg/dL Final    History of abnormal lipids: No  Mammo: 2024 . History of abnormal Mammo: No. Cyst in   Regular Self Breast Exams: Yes  Calcium/Vitamin D intake: source:  dairy Adequate? Yes  TSH: (  TSH   Date Value Ref Range Status   2022 1.42 0.40 - 4.00 mU/L Final    )  Pap; (No results found for: \"PAP\" )    HISTORY:  OB History    Para Term  AB Living   3 3 1 2 0 2   SAB IAB Ectopic Multiple Live Births   0 0 0 0 3      # Outcome Date GA Lbr Hernando/2nd Weight Sex Type Anes PTL " Lv   3  23 33w3d  1.84 kg (4 lb 0.9 oz) F CS-LTranv   JEFFERY      Name: CRUZ,FEMALE-CAROLINA I      Apgar1: 9  Apgar5: 9   2  17 32w2d  1.928 kg (4 lb 4 oz) M CS-LTranv  Y LIVE BIRTH      Complications: Abruptio Placenta      Name: Iván Gene Cruz   1 Term 14 40w6d 17:48 / 02:23 3.751 kg (8 lb 4.3 oz) M Vag-Spont EPI N JEFFERY      Name: Reymundo      Apgar1: 8  Apgar5: 9     Past Medical History:   Diagnosis Date    History of blood transfusion     Preeclampsia, severe, third trimester 2023    Sprain of ankle     Varicella      Past Surgical History:   Procedure Laterality Date    BIOPSY CERVICAL, LOCAL EXCISION, SINGLE/MULTIPLE       SECTION N/A 2017    LTCS 32 weeks abruption     SECTION, TUBAL LIGATION, COMBINED N/A 2023    Procedure:  SECTION, WITH possible POSTPARTUM TUBAL LIGATION;  Surgeon: Tea Carcamo MD;  Location: [facility]    MOUTH SURGERY       Family History   Problem Relation Age of Onset    Thyroid Disease Mother     Hypertension Mother     Arthritis Mother     Hyperlipidemia Father     Diabetes Maternal Grandmother     Cancer Maternal Grandmother     Dementia Paternal Grandmother     No Known Problems Brother     Breast Cancer Paternal Aunt      Social History     Socioeconomic History    Marital status:      Spouse name: None    Number of children: 2    Years of education: Post Grad    Highest education level: None   Tobacco Use    Smoking status: Never    Smokeless tobacco: Never   Vaping Use    Vaping status: Never Used   Substance and Sexual Activity    Alcohol use: No    Drug use: No    Sexual activity: Yes     Partners: Male       Current Outpatient Medications:     ibuprofen (ADVIL/MOTRIN) 600 MG tablet, Take 1 tablet (600 mg) by mouth every 6 hours as needed for moderate pain, Disp: 60 tablet, Rfl: 0     Allergies   Allergen Reactions    Hydrocodone-Acetaminophen Nausea and Vomiting    Nifedipine Hives  "      Past medical, surgical, social and family history were reviewed and updated in EPIC.    ROS:   C:     NEGATIVE for fever, chills, change in weight  I:       NEGATIVE for worrisome rashes, moles or lesions  E:     NEGATIVE for vision changes or irritation  E/M: NEGATIVE for ear, mouth and throat problems  R:     NEGATIVE for significant cough or SOB  CV:   NEGATIVE for chest pain, palpitations or peripheral edema  GI:     NEGATIVE for nausea, abdominal pain, heartburn, or change in bowel habits  :   NEGATIVE for frequency, dysuria, hematuria, vaginal discharge, or irregular bleeding  M:     NEGATIVE for significant arthralgias or myalgia  N:      NEGATIVE for weakness, dizziness or paresthesias  E:      NEGATIVE for temperature intolerance, skin/hair changes  P:      NEGATIVE for changes in mood or affect.    EXAM:  /79   Pulse 78   Temp 98  F (36.7  C)   Ht 1.702 m (5' 7\")   Wt 60.2 kg (132 lb 12.8 oz)   LMP 04/25/2025 (Exact Date)   SpO2 100%   Breastfeeding No   BMI 20.80 kg/m     BMI: Body mass index is 20.8 kg/m .  Constitutional: healthy, alert and no distress  Head: Normocephalic. No masses, lesions, tenderness or abnormalities  Neck: Neck supple. Trachea midline. No adenopathy. Thyroid symmetric, normal size.   Cardiovascular: RRR.   Respiratory: Negative.   Breast: No nodularity, asymmetry or nipple discharge bilaterally.  Gastrointestinal: Abdomen soft, non-tender, non-distended. No masses, organomegaly.  :  Vulva:  No external lesions, normal female hair distribution, no inguinal adenopathy.    Urethra:  Midline, non-tender, well supported, no discharge  Vagina:  Moist, pink, no abnormal discharge, no lesions.  Cervix without lesions, no CMT or bleeding to touch.  Uterus:  Normal size, non-tender, freely mobile  Ovaries:  No masses appreciated, non-tender, mobile  Rectal Exam: deferred  Musculoskeletal: extremities normal  Skin: no suspicious lesions or rashes  Psychiatric: Affect " appropriate, cooperative,mentation appears normal.     COUNSELING:   Reviewed preventive health counseling, as reflected in patient instructions  Special attention given to:        Regular exercise       Healthy diet/nutrition       Osteoporosis prevention/bone health       (Saba)menopause management   reports that she has never smoked. She has never used smokeless tobacco.    Body mass index is 20.8 kg/m .    FRAX Risk Assessment    ASSESSMENT:  40 year old female with satisfactory annual exam  Plan: pap UTD.  Non fasting labs.  Mammo UTD.  Discussed timing for colonoscopy.  We also discussed perimenopause and changes she may expect over the next years.  2) postcoital pain   No obvious lesion on cervix seen.  Will get pelvic us to check for polyp.  Since intermittent, can monitor if no findings.  3) incisional pain   May be related to scar tissue, but also abd wall dysfunction.  Discussed pelvic floor PT with some attention to the area and she is interested.  Will also order pelvic us to make sure no ovarian cysts, etc.  ALEX ROACH MD  In addition to preventative care, 15 min was spent discussing pain and perimenopause

## (undated) DEVICE — PREP CHLORAPREP 26ML TINTED ORANGE  260815

## (undated) DEVICE — SOL NACL 0.9% IRRIG 1000ML BOTTLE 07138-09

## (undated) DEVICE — STOCKING SLEEVE COMPRESSION CALF LG

## (undated) DEVICE — DRSG STERI STRIP 1/4X3" R1541

## (undated) DEVICE — CATH TRAY FOLEY SURESTEP 16FR WDRAIN BAG STLK LATEX A300316A

## (undated) DEVICE — PACK C-SECTION LF PL15OTA83B

## (undated) DEVICE — STRAP KNEE/BODY 31143004

## (undated) DEVICE — SOL WATER IRRIG 1000ML BOTTLE 07139-09

## (undated) DEVICE — DRSG ABDOMINAL 07 1/2X8" 7197D

## (undated) RX ORDER — MORPHINE SULFATE 1 MG/ML
INJECTION, SOLUTION EPIDURAL; INTRATHECAL; INTRAVENOUS
Status: DISPENSED
Start: 2023-07-09

## (undated) RX ORDER — FENTANYL CITRATE 50 UG/ML
INJECTION, SOLUTION INTRAMUSCULAR; INTRAVENOUS
Status: DISPENSED
Start: 2023-07-09

## (undated) RX ORDER — ESMOLOL HYDROCHLORIDE 10 MG/ML
INJECTION INTRAVENOUS
Status: DISPENSED
Start: 2023-07-09

## (undated) RX ORDER — PROPOFOL 10 MG/ML
INJECTION, EMULSION INTRAVENOUS
Status: DISPENSED
Start: 2023-07-09

## (undated) RX ORDER — DEXMEDETOMIDINE HYDROCHLORIDE 4 UG/ML
INJECTION, SOLUTION INTRAVENOUS
Status: DISPENSED
Start: 2023-07-07